# Patient Record
Sex: FEMALE | Race: WHITE | NOT HISPANIC OR LATINO | Employment: FULL TIME | URBAN - METROPOLITAN AREA
[De-identification: names, ages, dates, MRNs, and addresses within clinical notes are randomized per-mention and may not be internally consistent; named-entity substitution may affect disease eponyms.]

---

## 2017-04-24 ENCOUNTER — GENERIC CONVERSION - ENCOUNTER (OUTPATIENT)
Dept: OTHER | Facility: OTHER | Age: 27
End: 2017-04-24

## 2017-04-28 ENCOUNTER — GENERIC CONVERSION - ENCOUNTER (OUTPATIENT)
Dept: OTHER | Facility: OTHER | Age: 27
End: 2017-04-28

## 2018-01-11 NOTE — MISCELLANEOUS
Message  Return to work or school:   Rima Reno is under my professional care  She was seen in my office on 1/27/16  Please excuse from work 1/27/16               Signatures   Electronically signed by : Helen Jennings DO; Jan 27 2016  5:03PM EST                       (Author)
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
## 2018-03-19 ENCOUNTER — HOSPITAL ENCOUNTER (EMERGENCY)
Facility: HOSPITAL | Age: 28
Discharge: HOME | End: 2018-03-19
Payer: COMMERCIAL

## 2018-03-19 ENCOUNTER — APPOINTMENT (EMERGENCY)
Dept: RADIOLOGY | Facility: HOSPITAL | Age: 28
End: 2018-03-19
Payer: COMMERCIAL

## 2018-03-19 VITALS
SYSTOLIC BLOOD PRESSURE: 117 MMHG | WEIGHT: 210 LBS | DIASTOLIC BLOOD PRESSURE: 65 MMHG | HEART RATE: 90 BPM | TEMPERATURE: 98.3 F | RESPIRATION RATE: 16 BRPM | OXYGEN SATURATION: 100 %

## 2018-03-19 PROBLEM — M54.2 MUSCULOSKELETAL NECK PAIN: Status: ACTIVE | Noted: 2018-03-19

## 2018-03-19 LAB
BASE EXCESS BLDA CALC-SCNC: 4 MMOL/L (ref -2–3)
CA-I BLD-SCNC: 1.16 MMOL/L (ref 1.12–1.32)
GLUCOSE SERPL-MCNC: 106 MG/DL (ref 65–140)
HCO3 BLDA-SCNC: 27.3 MMOL/L (ref 24–30)
HCT VFR BLD CALC: 39 % (ref 34.8–46.1)
HGB BLDA-MCNC: 13.3 G/DL (ref 11.5–15.4)
HOLD SPECIMEN: NORMAL
PCO2 BLD: 28 MMOL/L (ref 21–32)
PCO2 BLD: 35 MM HG (ref 42–50)
PH BLD: 7.5 [PH] (ref 7.3–7.4)
PO2 BLD: 24 MM HG (ref 35–45)
POTASSIUM BLD-SCNC: 4.4 MMOL/L (ref 3.5–5.3)
SAO2 % BLD FROM PO2: 49 % (ref 95–98)
SODIUM BLD-SCNC: 139 MMOL/L (ref 136–145)
SPECIMEN SOURCE: ABNORMAL

## 2018-03-19 PROCEDURE — 99283 EMERGENCY DEPT VISIT LOW MDM: CPT | Performed by: SURGERY

## 2018-03-19 PROCEDURE — 99285 EMERGENCY DEPT VISIT HI MDM: CPT

## 2018-03-19 PROCEDURE — 82803 BLOOD GASES ANY COMBINATION: CPT

## 2018-03-19 PROCEDURE — 72125 CT NECK SPINE W/O DYE: CPT

## 2018-03-19 PROCEDURE — 84132 ASSAY OF SERUM POTASSIUM: CPT

## 2018-03-19 PROCEDURE — 70450 CT HEAD/BRAIN W/O DYE: CPT

## 2018-03-19 PROCEDURE — 74177 CT ABD & PELVIS W/CONTRAST: CPT

## 2018-03-19 PROCEDURE — 85014 HEMATOCRIT: CPT

## 2018-03-19 PROCEDURE — 96375 TX/PRO/DX INJ NEW DRUG ADDON: CPT

## 2018-03-19 PROCEDURE — 71045 X-RAY EXAM CHEST 1 VIEW: CPT

## 2018-03-19 PROCEDURE — 82330 ASSAY OF CALCIUM: CPT

## 2018-03-19 PROCEDURE — 71260 CT THORAX DX C+: CPT

## 2018-03-19 PROCEDURE — 96374 THER/PROPH/DIAG INJ IV PUSH: CPT

## 2018-03-19 PROCEDURE — 84295 ASSAY OF SERUM SODIUM: CPT

## 2018-03-19 PROCEDURE — 36415 COLL VENOUS BLD VENIPUNCTURE: CPT | Performed by: SURGERY

## 2018-03-19 PROCEDURE — 82947 ASSAY GLUCOSE BLOOD QUANT: CPT

## 2018-03-19 RX ORDER — KETOROLAC TROMETHAMINE 30 MG/ML
15 INJECTION, SOLUTION INTRAMUSCULAR; INTRAVENOUS ONCE
Status: COMPLETED | OUTPATIENT
Start: 2018-03-19 | End: 2018-03-19

## 2018-03-19 RX ORDER — FENTANYL CITRATE 50 UG/ML
50 INJECTION, SOLUTION INTRAMUSCULAR; INTRAVENOUS ONCE
Status: COMPLETED | OUTPATIENT
Start: 2018-03-19 | End: 2018-03-19

## 2018-03-19 RX ORDER — FENTANYL CITRATE 50 UG/ML
INJECTION, SOLUTION INTRAMUSCULAR; INTRAVENOUS CODE/TRAUMA/SEDATION MEDICATION
Status: COMPLETED | OUTPATIENT
Start: 2018-03-19 | End: 2018-03-19

## 2018-03-19 RX ORDER — ONDANSETRON 2 MG/ML
4 INJECTION INTRAMUSCULAR; INTRAVENOUS ONCE
Status: COMPLETED | OUTPATIENT
Start: 2018-03-19 | End: 2018-03-19

## 2018-03-19 RX ADMIN — FENTANYL CITRATE 50 MCG: 50 INJECTION, SOLUTION INTRAMUSCULAR; INTRAVENOUS at 19:30

## 2018-03-19 RX ADMIN — KETOROLAC TROMETHAMINE 15 MG: 30 INJECTION, SOLUTION INTRAMUSCULAR at 19:29

## 2018-03-19 RX ADMIN — ONDANSETRON 4 MG: 2 INJECTION INTRAMUSCULAR; INTRAVENOUS at 19:48

## 2018-03-19 RX ADMIN — FENTANYL CITRATE 50 MCG: 50 INJECTION, SOLUTION INTRAMUSCULAR; INTRAVENOUS at 16:40

## 2018-03-19 RX ADMIN — IOHEXOL 100 ML: 350 INJECTION, SOLUTION INTRAVENOUS at 16:56
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
## 2018-03-19 NOTE — PROGRESS NOTES
Patient re-evaluated and C collar cleared at bedside  Aware that all scans are negative  Patient feels sore, but would like to go home  She agrees to follow up with trauma as needed  Will discharge home with return precautions

## 2018-03-20 NOTE — DISCHARGE INSTR - AVS FIRST PAGE
As needed, for increasing neck pain, weakness, numbness, tingling, headache, persistent nausea/vomiting, or new/concerning symptoms

## 2018-03-23 ENCOUNTER — OFFICE VISIT (OUTPATIENT)
Dept: FAMILY MEDICINE CLINIC | Facility: CLINIC | Age: 28
End: 2018-03-23
Payer: COMMERCIAL

## 2018-03-23 VITALS
DIASTOLIC BLOOD PRESSURE: 62 MMHG | HEART RATE: 84 BPM | RESPIRATION RATE: 16 BRPM | TEMPERATURE: 96.3 F | HEIGHT: 62 IN | BODY MASS INDEX: 38.46 KG/M2 | WEIGHT: 209 LBS | SYSTOLIC BLOOD PRESSURE: 106 MMHG

## 2018-03-23 DIAGNOSIS — S06.0X0S CONCUSSION WITHOUT LOSS OF CONSCIOUSNESS, SEQUELA (HCC): Primary | ICD-10-CM

## 2018-03-23 PROBLEM — S06.0X0A CONCUSSION WITHOUT LOSS OF CONSCIOUSNESS: Status: ACTIVE | Noted: 2018-03-23

## 2018-03-23 PROBLEM — Z83.49 FAMILY HISTORY OF ALPHA 1 ANTITRYPSIN DEFICIENCY: Status: ACTIVE | Noted: 2017-06-07

## 2018-03-23 PROCEDURE — 99213 OFFICE O/P EST LOW 20 MIN: CPT | Performed by: FAMILY MEDICINE
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
## 2018-03-29 ENCOUNTER — OFFICE VISIT (OUTPATIENT)
Dept: OBGYN CLINIC | Facility: CLINIC | Age: 28
End: 2018-03-29
Payer: COMMERCIAL

## 2018-03-29 VITALS
BODY MASS INDEX: 38.46 KG/M2 | DIASTOLIC BLOOD PRESSURE: 70 MMHG | SYSTOLIC BLOOD PRESSURE: 124 MMHG | HEIGHT: 62 IN | WEIGHT: 209 LBS

## 2018-03-29 DIAGNOSIS — S06.0X0S CONCUSSION WITHOUT LOSS OF CONSCIOUSNESS, SEQUELA (HCC): ICD-10-CM

## 2018-03-29 PROCEDURE — 99204 OFFICE O/P NEW MOD 45 MIN: CPT | Performed by: ORTHOPAEDIC SURGERY
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
## 2018-07-23 ENCOUNTER — OFFICE VISIT (OUTPATIENT)
Dept: FAMILY MEDICINE CLINIC | Facility: CLINIC | Age: 28
End: 2018-07-23
Payer: COMMERCIAL

## 2018-07-23 VITALS
HEART RATE: 77 BPM | DIASTOLIC BLOOD PRESSURE: 64 MMHG | SYSTOLIC BLOOD PRESSURE: 102 MMHG | OXYGEN SATURATION: 97 % | WEIGHT: 211 LBS | RESPIRATION RATE: 16 BRPM | BODY MASS INDEX: 37.39 KG/M2 | HEIGHT: 63 IN

## 2018-07-23 DIAGNOSIS — Z02.1 PRE-EMPLOYMENT HEALTH SCREENING EXAMINATION: ICD-10-CM

## 2018-07-23 PROCEDURE — 99499 UNLISTED E&M SERVICE: CPT | Performed by: FAMILY MEDICINE

## 2018-07-23 NOTE — PROGRESS NOTES
Assessment/Plan:    Pre-employment health screening examination  A/P:  Pre-employment drug screening done during this visit  Patient has a follow-up visit with PCP at which time she will do her blood work including lipid panel, CBC and CMP  Subjective:      Patient ID: Jean Carlos Tierney is a 32 y o  female  Patient is a 19-year-old female with history of chronic sinusitis here for an employment physical  Patient is a  and works at [employer]  Patient follows up with her regular PCP Dr Kurtis Ga  Currently not on any medications except prenatal vitamins  Patient has 9month-old status post  for elevated blood pressure  She is currently trying to lose weight going to the gym regularly  Patient is , has not been sexually active since she gave birth to the baby stating that she does not feel confident with her current weight  Not on any birth control, stating it may dry up her milk source  Patient currently not interested in any contraception  States she occasionally drinks a glass of wine, denies smoking or illicit drug use  Patient was glasses and follows up with her regular eye doctor  Patient states she has an upcoming appointment with her PCP, and would like to wait and do her annual lab work at that time  No complaints at this time  The following portions of the patient's history were reviewed and updated as appropriate: allergies, current medications, past family history, past medical history, past social history, past surgical history and problem list     Review of Systems   Constitutional: Negative for fatigue  Eyes: Negative for visual disturbance  Respiratory: Negative for shortness of breath  Cardiovascular: Negative for chest pain  Gastrointestinal: Negative for abdominal pain, constipation, diarrhea, nausea and vomiting  Genitourinary: Negative for dysuria and vaginal discharge     Musculoskeletal: Negative for arthralgias and gait problem  Skin: Negative  Neurological: Negative  Negative for headaches  Psychiatric/Behavioral: Negative  Objective:      /64 (BP Location: Left arm, Patient Position: Sitting)   Pulse 77   Resp 16   Ht 5' 3" (1 6 m)   Wt 95 7 kg (211 lb)   SpO2 97%   BMI 37 38 kg/m²          Physical Exam   Constitutional: She is oriented to person, place, and time  She appears well-developed and well-nourished  No distress  HENT:   Head: Normocephalic and atraumatic  Nose: Nose normal    Mouth/Throat: Oropharynx is clear and moist    Eyes: Conjunctivae and EOM are normal  Pupils are equal, round, and reactive to light  Neck: Normal range of motion  Neck supple  Cardiovascular: Normal rate, regular rhythm and normal heart sounds  Pulmonary/Chest: Effort normal and breath sounds normal  No respiratory distress  She has no wheezes  She has no rales  She exhibits no tenderness  Abdominal: Soft  Bowel sounds are normal  She exhibits no distension and no mass  There is no tenderness  There is no rebound and no guarding  Musculoskeletal: Normal range of motion  She exhibits no edema, tenderness or deformity  Lymphadenopathy:     She has no cervical adenopathy  Neurological: She is alert and oriented to person, place, and time  Skin: Skin is warm and dry  No rash noted  No erythema  No pallor  Psychiatric: She has a normal mood and affect  Nursing note and vitals reviewed

## 2018-07-24 PROBLEM — Z02.1 PRE-EMPLOYMENT HEALTH SCREENING EXAMINATION: Status: ACTIVE | Noted: 2018-07-24

## 2018-07-24 NOTE — ASSESSMENT & PLAN NOTE
A/P:  Pre-employment drug screening done during this visit  Patient has a follow-up visit with PCP at which time she will do her blood work including lipid panel, CBC and CMP

## 2020-01-27 ENCOUNTER — OFFICE VISIT (OUTPATIENT)
Dept: FAMILY MEDICINE CLINIC | Facility: CLINIC | Age: 30
End: 2020-01-27
Payer: COMMERCIAL

## 2020-01-27 ENCOUNTER — TELEPHONE (OUTPATIENT)
Dept: FAMILY MEDICINE CLINIC | Facility: CLINIC | Age: 30
End: 2020-01-27

## 2020-01-27 VITALS
RESPIRATION RATE: 16 BRPM | TEMPERATURE: 98.3 F | OXYGEN SATURATION: 96 % | HEART RATE: 85 BPM | SYSTOLIC BLOOD PRESSURE: 118 MMHG | HEIGHT: 63 IN | DIASTOLIC BLOOD PRESSURE: 88 MMHG | BODY MASS INDEX: 37.39 KG/M2 | WEIGHT: 211 LBS

## 2020-01-27 DIAGNOSIS — J02.9 ACUTE PHARYNGITIS, UNSPECIFIED ETIOLOGY: Primary | ICD-10-CM

## 2020-01-27 DIAGNOSIS — Z13.6 SCREENING FOR CARDIOVASCULAR CONDITION: ICD-10-CM

## 2020-01-27 DIAGNOSIS — Z13.0 SCREENING FOR DEFICIENCY ANEMIA: ICD-10-CM

## 2020-01-27 LAB — S PYO AG THROAT QL: NEGATIVE

## 2020-01-27 PROCEDURE — 99213 OFFICE O/P EST LOW 20 MIN: CPT | Performed by: FAMILY MEDICINE

## 2020-01-27 PROCEDURE — 87880 STREP A ASSAY W/OPTIC: CPT | Performed by: FAMILY MEDICINE

## 2020-01-27 RX ORDER — LIDOCAINE HYDROCHLORIDE 20 MG/ML
15 SOLUTION OROPHARYNGEAL 4 TIMES DAILY PRN
Qty: 100 ML | Refills: 0 | Status: SHIPPED | OUTPATIENT
Start: 2020-01-27 | End: 2020-02-07 | Stop reason: ALTCHOICE

## 2020-01-27 RX ORDER — AMOXICILLIN 400 MG/5ML
800 POWDER, FOR SUSPENSION ORAL 2 TIMES DAILY
Qty: 200 ML | Refills: 0 | Status: SHIPPED | OUTPATIENT
Start: 2020-01-27 | End: 2020-02-07 | Stop reason: ALTCHOICE

## 2020-01-27 RX ORDER — NORETHINDRONE ACETATE AND ETHINYL ESTRADIOL AND FERROUS FUMARATE 1MG-20(24)
KIT ORAL
COMMUNITY
Start: 2019-12-09 | End: 2020-06-29 | Stop reason: ALTCHOICE

## 2020-01-27 NOTE — TELEPHONE ENCOUNTER
Pt states the pain in her throat is a lot worse than this morning, she states she took 800mg motrin and that didn't touch the pain, also states she tried the spray and that did not help either  Pt states mom is a nurse and recommended a lidocaine gargle     Please advise [facility]

## 2020-01-27 NOTE — LETTER
January 27, 2020     Patient: Sushila Reyes   YOB: 1990   Date of Visit: 1/27/2020       To Whom it May Concern:    Brittnee Au is under my professional care  She was seen in my office on 1/27/2020  If you have any questions or concerns, please don't hesitate to call           Sincerely,          Shazia Coyne DO        CC: No Recipients

## 2020-01-27 NOTE — PROGRESS NOTES
Assessment/Plan:    1  Acute pharyngitis, unspecified etiology  Comments:  New  Orders:  -     POCT rapid strepA  -     amoxicillin (AMOXIL) 400 MG/5ML suspension; Take 10 mL (800 mg total) by mouth 2 (two) times a day for 10 days    2  Screening for cardiovascular condition  -     Comprehensive metabolic panel; Future  -     Lipid Panel with Direct LDL reflex; Future  -     Comprehensive metabolic panel  -     Lipid Panel with Direct LDL reflex    3  Screening for deficiency anemia  -     CBC; Future  -     CBC           There are no Patient Instructions on file for this visit  Return for February as scheduled   Subjective:      Patient ID: Martell Riley is a 34 y o  female  Chief Complaint   Patient presents with    Sore Throat     started thursday  vfrma    Fever     102 on saturday morning    Sinus Problem    Earache       She has a sore throat that started a few days ago  Her voice started going on the 24th  She has not been sleeping  She had a fever on the 25th  She has been taking Sudafed and advil  She has nasal congestion and sinus pressure  The following portions of the patient's history were reviewed and updated as appropriate:  past social history    Review of Systems   HENT: Positive for sore throat  Current Outpatient Medications   Medication Sig Dispense Refill    Norethin Ace-Eth Estrad-FE 1-20 MG-MCG(24) CHEW CSW ONE T PO D      amoxicillin (AMOXIL) 400 MG/5ML suspension Take 10 mL (800 mg total) by mouth 2 (two) times a day for 10 days 200 mL 0    Prenatal MV-Min-Fe Fum-FA-DHA (PRENATAL 1 PO) Take by mouth       No current facility-administered medications for this visit  Objective:    /88   Pulse 85   Temp 98 3 °F (36 8 °C)   Resp 16   Ht 5' 3" (1 6 m)   Wt 95 7 kg (211 lb)   SpO2 96%   BMI 37 38 kg/m²      Physical Exam   Constitutional: She appears well-developed and well-nourished  HENT:   Head: Normocephalic and atraumatic     Right Ear: External ear normal    Left Ear: External ear normal    Mouth/Throat: Uvula is midline  Oropharyngeal exudate present  Cardiovascular: Normal rate, regular rhythm and normal heart sounds  Exam reveals no friction rub  No murmur heard  Pulmonary/Chest: Effort normal and breath sounds normal  No respiratory distress  She has no wheezes  She has no rales  Musculoskeletal: She exhibits no edema or deformity  Nursing note and vitals reviewed            Adwoa Palmer DO

## 2020-01-31 ENCOUNTER — OFFICE VISIT (OUTPATIENT)
Dept: FAMILY MEDICINE CLINIC | Facility: CLINIC | Age: 30
End: 2020-01-31
Payer: COMMERCIAL

## 2020-01-31 VITALS
WEIGHT: 211 LBS | DIASTOLIC BLOOD PRESSURE: 78 MMHG | TEMPERATURE: 99.5 F | SYSTOLIC BLOOD PRESSURE: 114 MMHG | BODY MASS INDEX: 37.39 KG/M2 | HEART RATE: 80 BPM | HEIGHT: 63 IN | RESPIRATION RATE: 16 BRPM

## 2020-01-31 DIAGNOSIS — H10.31 ACUTE CONJUNCTIVITIS OF RIGHT EYE, UNSPECIFIED ACUTE CONJUNCTIVITIS TYPE: Primary | ICD-10-CM

## 2020-01-31 PROCEDURE — 99213 OFFICE O/P EST LOW 20 MIN: CPT | Performed by: NURSE PRACTITIONER

## 2020-01-31 PROCEDURE — 3008F BODY MASS INDEX DOCD: CPT | Performed by: NURSE PRACTITIONER

## 2020-01-31 RX ORDER — MOXIFLOXACIN 5 MG/ML
1 SOLUTION/ DROPS OPHTHALMIC 3 TIMES DAILY
Qty: 3 ML | Refills: 0 | Status: SHIPPED | OUTPATIENT
Start: 2020-01-31 | End: 2020-02-07

## 2020-01-31 NOTE — LETTER
January 31, 2020     Patient: Kennedi Spencer   YOB: 1990   Date of Visit: 1/31/2020       To Whom it May Concern:    Mei Weber is under my professional care  She was seen in my office on 1/31/2020  She may return to work on 02/3/2020  If you have any questions or concerns, please don't hesitate to call           Sincerely,          BETTY Guerrero        CC: No Recipients

## 2020-01-31 NOTE — PROGRESS NOTES
Assessment/Plan:    1  Acute conjunctivitis of right eye, unspecified acute conjunctivitis type  -     moxifloxacin (VIGAMOX) 0 5 % ophthalmic solution; Administer 1 drop to the right eye 3 (three) times a day for 7 days          BMI Counseling: Body mass index is 37 68 kg/m²  Discussed the patient's BMI with her  The BMI is above normal  Nutrition recommendations include reducing portion sizes, decreasing overall calorie intake, 3-5 servings of fruits/vegetables daily, reducing fast food intake, consuming healthier snacks, decreasing soda and/or juice intake and moderation in carbohydrate intake  Patient Instructions:  Supportive care discussed and advised  Advised to RTO for any worsening and no improvement  Follow up for no improvement and worsening of conditions  Patient advised and educated when to see immediate medical care  Return if symptoms worsen or fail to improve  Future Appointments   Date Time Provider Don Fung   2/7/2020 10:45 AM DO CAMMIE Dickinson [facility]           Subjective:      Patient ID: Hunter Samuel is a 34 y o  female  Chief Complaint   Patient presents with    Follow-up     Was here Monday  Now, right eye is itchy and the left eye is starting to itch  Yellow drainage from right eye [id number]         Vitals:  /78   Pulse 80   Temp 99 5 °F (37 5 °C)   Resp 16   Ht 5' 2 75" (1 594 m)   Wt 95 7 kg (211 lb)   LMP 01/20/2020 (Exact Date)   BMI 37 68 kg/m²     HPI  Patient stated that was seen couple of days ago for acute pharyngitis and on amxo and feeling better but still congested and started using mucinex  Today woke up with right eye redness and discharge  Tried warm compresses  Denies any vision changes  Denies fever, chills and sob            The following portions of the patient's history were reviewed and updated as appropriate: allergies, current medications, past family history, past medical history, past social history, past surgical history and problem list       Review of Systems   Constitutional: Negative for chills, diaphoresis, fatigue, fever and unexpected weight change  HENT: Positive for congestion  Negative for dental problem, drooling, ear discharge, ear pain, facial swelling, hearing loss, mouth sores, nosebleeds, postnasal drip, rhinorrhea, sinus pressure, sinus pain, sneezing, sore throat, tinnitus, trouble swallowing and voice change  Eyes: Positive for discharge and redness  Negative for photophobia, pain, itching and visual disturbance  Respiratory: Negative for cough, chest tightness, shortness of breath and wheezing  Cardiovascular: Negative  Gastrointestinal: Negative for abdominal pain, constipation, diarrhea, nausea and vomiting  Musculoskeletal: Negative  Skin: Negative  Neurological: Negative for dizziness, weakness, light-headedness and headaches  Hematological: Negative  Objective:    Social History     Tobacco Use   Smoking Status Never Smoker   Smokeless Tobacco Never Used       Allergies: No Known Allergies      Current Outpatient Medications   Medication Sig Dispense Refill    amoxicillin (AMOXIL) 400 MG/5ML suspension Take 10 mL (800 mg total) by mouth 2 (two) times a day for 10 days 200 mL 0    Norethin Ace-Eth Estrad-FE 1-20 MG-MCG(24) CHEW CSW ONE T PO D      Lidocaine Viscous HCl (XYLOCAINE) 2 % mucosal solution Swish and spit 15 mL 4 (four) times a day as needed for mouth pain or discomfort (Patient not taking: Reported on 1/31/2020) 100 mL 0    moxifloxacin (VIGAMOX) 0 5 % ophthalmic solution Administer 1 drop to the right eye 3 (three) times a day for 7 days 3 mL 0    Prenatal MV-Min-Fe Fum-FA-DHA (PRENATAL 1 PO) Take by mouth       No current facility-administered medications for this visit  Physical Exam   Constitutional: She is oriented to person, place, and time  She appears well-developed and well-nourished  HENT:   Head: Normocephalic     Right Ear: Tympanic membrane, external ear and ear canal normal    Left Ear: Tympanic membrane, external ear and ear canal normal    Nose: Mucosal edema present  Right sinus exhibits no maxillary sinus tenderness and no frontal sinus tenderness  Left sinus exhibits no maxillary sinus tenderness and no frontal sinus tenderness  Mouth/Throat: Oropharynx is clear and moist and mucous membranes are normal    Eyes: Lids are normal  Right conjunctiva is injected  Left conjunctiva is not injected  Neck: Neck supple  Cardiovascular: Normal rate, regular rhythm and normal heart sounds  Pulmonary/Chest: Effort normal and breath sounds normal    Abdominal: Normal appearance and bowel sounds are normal  There is no hepatosplenomegaly  There is no tenderness  There is no rebound  Musculoskeletal: Normal range of motion  Lymphadenopathy:        Right cervical: No superficial cervical and no posterior cervical adenopathy present  Left cervical: No superficial cervical and no posterior cervical adenopathy present  Neurological: She is alert and oriented to person, place, and time  Skin: Skin is warm and dry  Psychiatric: She has a normal mood and affect  Her behavior is normal  Judgment and thought content normal    Vitals reviewed                    BETTY Gonzalez

## 2020-01-31 NOTE — PATIENT INSTRUCTIONS
Supportive care discussed and advised  Advised to RTO for any worsening and no improvement  Follow up for no improvement and worsening of conditions  Patient advised and educated when to see immediate medical care  Conjunctivitis   AMBULATORY CARE:   Conjunctivitis,  or pink eye, is inflammation of your conjunctiva  The conjunctiva is a thin tissue that covers the front of your eye and the back of your eyelids  The conjunctiva helps protect your eye and keep it moist  Conjunctivitis may be caused by bacteria, allergies, or a virus  If your conjunctivitis is caused by bacteria, it may get better on its own in about 7 days  Viral conjunctivitis can last up to 3 weeks  Common symptoms may include any of the following: You will usually have symptoms in both eyes if your conjunctivitis is caused by allergies  You may also have other allergic symptoms, such as a rash or runny nose  Symptoms will usually start in 1 eye if your conjunctivitis is caused by a virus or bacteria  · Redness in the whites of your eye    · Itching in your eye or around your eye    · Feeling like there is something in your eye    · Watery or thick, sticky discharge    · Crusty eyelids when you wake up in the morning    · Burning, stinging, or swelling in your eye    · Pain when you see bright light  Seek care immediately if:   · You have worsening eye pain  · The swelling in your eye gets worse, even after treatment  · Your vision suddenly becomes worse or you cannot see at all  Contact your healthcare provider if:   · You develop a fever and ear pain  · You have tiny bumps or spots of blood on your eye  · You have questions or concerns about your condition or care  Treatment  will depend on the cause of your conjunctivitis  You may need antibiotics or allergy medicine as a pill, eye drop, or eye ointment  Manage your symptoms:   · Apply a cool compress  Wet a washcloth with cold water and place it on your eye  This will help decrease itching and irritation  · Do not wear contact lenses  They can irritate your eye  Throw away the pair you are using and ask when you can wear them again  Use a new pair of lenses when your healthcare provider says it is okay  · Avoid irritants  Stay away from smoke filled areas  Shield your eyes from wind and sun  · Flush your eye  You may need to flush your eye with saline to help decrease your symptoms  Ask for more information on how to flush your eye  Medicines:  Treatment depends on what is causing your conjunctivitis  You may be given any of the following:  · Allergy medicine  helps decrease itchy, red, swollen eyes caused by allergies  It may be given as a pill, eye drops, or nasal spray  · Antibiotics  may be needed if your conjunctivitis is caused by bacteria  This medicine may be given as a pill, eye drops, or eye ointment  · Take your medicine as directed  Contact your healthcare provider if you think your medicine is not helping or if you have side effects  Tell him or her if you are allergic to any medicine  Keep a list of the medicines, vitamins, and herbs you take  Include the amounts, and when and why you take them  Bring the list or the pill bottles to follow-up visits  Carry your medicine list with you in case of an emergency  Prevent the spread of conjunctivitis:   · Wash your hands with soap and water often  Wash your hands before and after you touch your eyes  Also wash your hands before you prepare or eat food and after you use the bathroom or change a diaper  · Avoid allergens  Try to avoid the things that cause your allergies, such as pets, dust, or grass  · Avoid contact with others  Do not share towels or washcloths  Try to stay away from others as much as possible  Ask when you can return to work or school  · Throw away eye makeup  The bacteria that caused your conjunctivitis can stay in eye makeup   Throw away mascara and other eye makeup  © 2017 [facility] Information is for End User's use only and may not be sold, redistributed or otherwise used for commercial purposes  All illustrations and images included in CareNotes® are the copyrighted property of A D A M , Inc  or Marquise Alfaro  The above information is an  only  It is not intended as medical advice for individual conditions or treatments  Talk to your doctor, nurse or pharmacist before following any medical regimen to see if it is safe and effective for you

## 2020-02-04 LAB
ALBUMIN SERPL-MCNC: 4.1 G/DL (ref 3.9–5)
ALBUMIN/GLOB SERPL: 1.3 {RATIO} (ref 1.2–2.2)
ALP SERPL-CCNC: 53 IU/L (ref 39–117)
ALT SERPL-CCNC: 38 IU/L (ref 0–32)
AST SERPL-CCNC: 31 IU/L (ref 0–40)
BILIRUB SERPL-MCNC: <0.2 MG/DL (ref 0–1.2)
BUN SERPL-MCNC: 14 MG/DL (ref 6–20)
BUN/CREAT SERPL: 18 (ref 9–23)
CALCIUM SERPL-MCNC: 9.5 MG/DL (ref 8.7–10.2)
CHLORIDE SERPL-SCNC: 99 MMOL/L (ref 96–106)
CHOLEST SERPL-MCNC: 204 MG/DL (ref 100–199)
CO2 SERPL-SCNC: 23 MMOL/L (ref 20–29)
CREAT SERPL-MCNC: 0.77 MG/DL (ref 0.57–1)
ERYTHROCYTE [DISTWIDTH] IN BLOOD BY AUTOMATED COUNT: 13.4 % (ref 11.7–15.4)
GLOBULIN SER-MCNC: 3.1 G/DL (ref 1.5–4.5)
GLUCOSE SERPL-MCNC: 109 MG/DL (ref 65–99)
HCT VFR BLD AUTO: 40.3 % (ref 34–46.6)
HDLC SERPL-MCNC: 45 MG/DL
HGB BLD-MCNC: 14 G/DL (ref 11.1–15.9)
LDLC SERPL CALC-MCNC: 138 MG/DL (ref 0–99)
LDLC/HDLC SERPL: 3.1 RATIO (ref 0–3.2)
MCH RBC QN AUTO: 30.3 PG (ref 26.6–33)
MCHC RBC AUTO-ENTMCNC: 34.7 G/DL (ref 31.5–35.7)
MCV RBC AUTO: 87 FL (ref 79–97)
MICRODELETION SYND BLD/T FISH: NORMAL
PLATELET # BLD AUTO: 392 X10E3/UL (ref 150–450)
POTASSIUM SERPL-SCNC: 4.8 MMOL/L (ref 3.5–5.2)
PROT SERPL-MCNC: 7.2 G/DL (ref 6–8.5)
RBC # BLD AUTO: 4.62 X10E6/UL (ref 3.77–5.28)
SL AMB EGFR AFRICAN AMERICAN: 121 ML/MIN/1.73
SL AMB EGFR NON AFRICAN AMERICAN: 105 ML/MIN/1.73
SL AMB VLDL CHOLESTEROL CALC: 21 MG/DL (ref 5–40)
SODIUM SERPL-SCNC: 140 MMOL/L (ref 134–144)
TRIGL SERPL-MCNC: 105 MG/DL (ref 0–149)
WBC # BLD AUTO: 7.2 X10E3/UL (ref 3.4–10.8)

## 2020-02-07 ENCOUNTER — OFFICE VISIT (OUTPATIENT)
Dept: FAMILY MEDICINE CLINIC | Facility: CLINIC | Age: 30
End: 2020-02-07
Payer: COMMERCIAL

## 2020-02-07 VITALS
HEART RATE: 85 BPM | HEIGHT: 63 IN | OXYGEN SATURATION: 96 % | DIASTOLIC BLOOD PRESSURE: 80 MMHG | SYSTOLIC BLOOD PRESSURE: 112 MMHG | RESPIRATION RATE: 16 BRPM | BODY MASS INDEX: 37.03 KG/M2 | TEMPERATURE: 98.5 F | WEIGHT: 209 LBS

## 2020-02-07 DIAGNOSIS — F41.1 GENERALIZED ANXIETY DISORDER: ICD-10-CM

## 2020-02-07 DIAGNOSIS — R74.8 ELEVATED LIVER ENZYMES: ICD-10-CM

## 2020-02-07 DIAGNOSIS — R73.09 ABNORMAL GLUCOSE: ICD-10-CM

## 2020-02-07 DIAGNOSIS — Z00.00 ANNUAL PHYSICAL EXAM: Primary | ICD-10-CM

## 2020-02-07 PROCEDURE — 3008F BODY MASS INDEX DOCD: CPT | Performed by: FAMILY MEDICINE

## 2020-02-07 PROCEDURE — 99395 PREV VISIT EST AGE 18-39: CPT | Performed by: FAMILY MEDICINE

## 2020-02-07 RX ORDER — ESCITALOPRAM OXALATE 10 MG/1
10 TABLET ORAL DAILY
Qty: 30 TABLET | Refills: 1 | Status: SHIPPED | OUTPATIENT
Start: 2020-02-07 | End: 2020-02-28

## 2020-02-07 NOTE — PROGRESS NOTES
FAMILY PRACTICE HEALTH MAINTENANCE OFFICE VISIT  [facility]    NAME: Fabiana Nolan  AGE: 34 y o  SEX: female  : 1990     DATE: 2020    Assessment and Plan     1  Annual physical exam    2  Elevated liver enzymes  -     Comprehensive metabolic panel; Future  -     Comprehensive metabolic panel    3  Abnormal glucose  Assessment & Plan:  Borderline  Will recheck       4  Generalized anxiety disorder  -     escitalopram (LEXAPRO) 10 mg tablet; Take 1 tablet (10 mg total) by mouth daily      · Patient Counseling:   · Nutrition: Stressed importance of a well balanced diet, moderation of sodium/saturated fat, caloric balance and sufficient intake of fiber  · Exercise: Stressed the importance of regular exercise with a goal of 150 minutes per week  · Dental Health: Discussed daily flossing and brushing and regular dental visits     · Immunizations reviewed: Up To Date  · Discussed benefits of:  Cervical Cancer screening   BMI Counseling: Body mass index is 37 32 kg/m²  Discussed with patient's BMI with her  The BMI is above normal  Exercise recommendations include exercising 3-5 times per week  Return in about 2 months (around 2020) for Next scheduled follow up  Chief Complaint     Chief Complaint   Patient presents with    Physical Exam     no pap needed  vfrma       History of Present Illness     She is still having nasal congestion  Her throat is still sore, but better  She is getting overwhelmed with anxiety  She shut down one day a week ago  She tends to bottle it up         Well Adult Physical   Patient here for a comprehensive physical exam       Diet and Physical Activity  Diet: well balanced diet  Exercise: infrequently      Depression Screen  PHQ-9 Depression Screening    PHQ-9:    Frequency of the following problems over the past two weeks:       Little interest or pleasure in doing things:  0 - not at all  Feeling down, depressed, or hopeless:  0 - not at all  PHQ-2 Score:  0          General Health  Hearing: Normal:  bilateral  Vision: wears glasses  Dental: regular dental visits    Reproductive Health  Regular Periods, has stopped breast feeding       The following portions of the patient's history were reviewed and updated as appropriate: allergies, current medications, past family history, past medical history, past social history, past surgical history and problem list     Review of Systems     Review of Systems   Respiratory: Negative  Cardiovascular: Negative  Psychiatric/Behavioral: The patient is nervous/anxious          Past Medical History     Past Medical History:   Diagnosis Date    Abnormal weight gain     Candidiasis, mouth     Cellulitis of right arm     Kidney infection     SIRS (systemic inflammatory response syndrome) (HCC)     Tinea corporis        Past Surgical History     Past Surgical History:   Procedure Laterality Date     SECTION      FOOT SURGERY         Social History     Social History     Socioeconomic History    Marital status: /Civil Union     Spouse name: None    Number of children: None    Years of education: None    Highest education level: None   Occupational History    None   Social Needs    Financial resource strain: None    Food insecurity:     Worry: None     Inability: None    Transportation needs:     Medical: None     Non-medical: None   Tobacco Use    Smoking status: Never Smoker    Smokeless tobacco: Never Used   Substance and Sexual Activity    Alcohol use: No    Drug use: No    Sexual activity: None   Lifestyle    Physical activity:     Days per week: None     Minutes per session: None    Stress: None   Relationships    Social connections:     Talks on phone: None     Gets together: None     Attends Sabianism service: None     Active member of club or organization: None     Attends meetings of clubs or organizations: None     Relationship status: None  Intimate partner violence:     Fear of current or ex partner: None     Emotionally abused: None     Physically abused: None     Forced sexual activity: None   Other Topics Concern    None   Social History Narrative    None       Family History     Family History   Problem Relation Age of Onset    Diabetes Maternal Grandmother     Cancer Maternal Grandfather     Cancer Paternal Grandfather     Diabetes Mother     No Known Problems Father     Other Half-Sister         ITP    Depression Half-Sister     Learning disabilities Half-Brother     Auditory processing disorder Half-Brother        Current Medications       Current Outpatient Medications:     moxifloxacin (VIGAMOX) 0 5 % ophthalmic solution, Administer 1 drop to the right eye 3 (three) times a day for 7 days, Disp: 3 mL, Rfl: 0    Norethin Ace-Eth Estrad-FE 1-20 MG-MCG(24) CHEW, CSW ONE T PO D, Disp: , Rfl:     escitalopram (LEXAPRO) 10 mg tablet, Take 1 tablet (10 mg total) by mouth daily, Disp: 30 tablet, Rfl: 1     Allergies     No Known Allergies    Objective     /80   Pulse 85   Temp 98 5 °F (36 9 °C)   Resp 16   Ht 5' 2 75" (1 594 m)   Wt 94 8 kg (209 lb)   LMP 01/20/2020 (Exact Date)   SpO2 96%   Breastfeeding No   BMI 37 32 kg/m²      Physical Exam   Constitutional: She is oriented to person, place, and time  She appears well-developed and well-nourished  HENT:   Head: Normocephalic and atraumatic  Right Ear: External ear normal    Left Ear: External ear normal    Mouth/Throat: Oropharynx is clear and moist    Eyes: Pupils are equal, round, and reactive to light  Neck: Normal range of motion  Cardiovascular: Normal rate, regular rhythm and normal heart sounds  Exam reveals no friction rub  No murmur heard  Pulmonary/Chest: Effort normal and breath sounds normal  No respiratory distress  She has no wheezes  She has no rales  Abdominal: Soft  Bowel sounds are normal  She exhibits no distension and no mass  There is no tenderness  There is no rebound and no guarding  Musculoskeletal: Normal range of motion  She exhibits no edema  Neurological: She is alert and oriented to person, place, and time  She has normal reflexes  Skin: Skin is warm  Nursing note and vitals reviewed           Visual Acuity Screening    Right eye Left eye Both eyes   Without correction:      With correction: 20/20 20/20 1 [facility], [address] [place] [address]

## 2020-02-27 DIAGNOSIS — F41.1 GENERALIZED ANXIETY DISORDER: ICD-10-CM

## 2020-02-27 NOTE — TELEPHONE ENCOUNTER
Requested medication(s) are due for refill today: Yes  Patient has already received a courtesy refill: No  Other reason request has been forwarded to provider: failed protocol

## 2020-02-28 RX ORDER — ESCITALOPRAM OXALATE 10 MG/1
10 TABLET ORAL DAILY
Qty: 30 TABLET | Refills: 1 | Status: SHIPPED | OUTPATIENT
Start: 2020-02-28 | End: 2020-03-24 | Stop reason: SDUPTHER

## 2020-03-24 ENCOUNTER — TELEMEDICINE (OUTPATIENT)
Dept: FAMILY MEDICINE CLINIC | Facility: CLINIC | Age: 30
End: 2020-03-24
Payer: COMMERCIAL

## 2020-03-24 DIAGNOSIS — F41.1 GENERALIZED ANXIETY DISORDER: Primary | ICD-10-CM

## 2020-03-24 DIAGNOSIS — R74.8 ELEVATED LIVER ENZYMES: ICD-10-CM

## 2020-03-24 PROBLEM — Z02.1 PRE-EMPLOYMENT HEALTH SCREENING EXAMINATION: Status: RESOLVED | Noted: 2018-07-24 | Resolved: 2020-03-24

## 2020-03-24 PROCEDURE — 99213 OFFICE O/P EST LOW 20 MIN: CPT | Performed by: FAMILY MEDICINE

## 2020-03-24 RX ORDER — ESCITALOPRAM OXALATE 5 MG/1
5 TABLET ORAL DAILY
Qty: 30 TABLET | Refills: 3 | Status: SHIPPED | OUTPATIENT
Start: 2020-03-24 | End: 2020-06-29 | Stop reason: ALTCHOICE

## 2020-03-24 NOTE — PROGRESS NOTES
Virtual Regular Visit    Problem List Items Addressed This Visit     BMI 37 0-37 9, adult     She reports that she is down 10 pounds  Will keep working on diet         Elevated liver enzymes     Was slightly elevated  Will recheck labs when [employer] outbreak calms down         Generalized anxiety disorder - Primary     Patient has been doing well on her medication  Dose of Lexapro decreased from 10-5 mg daily    Patient will take the 5 mg daily for at least a month  If she is feeling well and she may take it every other day for at least 2 weeks before stopping the medication         Relevant Medications    escitalopram (LEXAPRO) 5 mg tablet               Reason for visit is follow up medication  Encounter provider Sandra Joseph DO    Provider located at [address]  [address] [facility]  [address]  [place] [address]      Recent Visits  No visits were found meeting these conditions  Showing recent visits within past 7 days and meeting all other requirements     Future Appointments  No visits were found meeting these conditions  Showing future appointments within next 150 days and meeting all other requirements        After connecting through [employer], the patient was identified by name and date of birth  Deon Hemphill was informed that this is a telemedicine visit and that the visit is being conducted through [facility] which may not be secure and therefore, might not be HIPAA-compliant  My office door was closed  No one else was in the room  She acknowledged consent and understanding of privacy and security of the video platform  The patient has agreed to participate and understands they can discontinue the visit at any time  Bang Taylor is a 34 y o  female     She is doing better on the anxiety medication  She has started meditation and is reading pretty much every night  She is taking the medication every other day    It helped her a lot in the beginning  She has been eating better and moving more  She has lost 10 pounds  Past Medical History:   Diagnosis Date    Abnormal weight gain     Candidiasis, mouth     Cellulitis of right arm     Kidney infection     Pre-employment health screening examination 2018    SIRS (systemic inflammatory response syndrome) (HCC)     Tinea corporis        Past Surgical History:   Procedure Laterality Date     SECTION      FOOT SURGERY         Current Outpatient Medications   Medication Sig Dispense Refill    escitalopram (LEXAPRO) 5 mg tablet Take 1 tablet (5 mg total) by mouth daily 30 tablet 3    Norethin Ace-Eth Estrad-FE 1-20 MG-MCG(24) CHEW CSW ONE T PO D       No current facility-administered medications for this visit  No Known Allergies    Review of Systems   Respiratory: Negative  Cardiovascular: Negative  Psychiatric/Behavioral: The patient is not nervous/anxious  Physical Exam   Constitutional: She appears well-nourished  No distress  Pulmonary/Chest: Effort normal    Psychiatric: She has a normal mood and affect  Her behavior is normal  Judgment and thought content normal         I spent 15 minutes with the patient during this visit

## 2020-03-24 NOTE — ASSESSMENT & PLAN NOTE
Was slightly elevated  Will recheck labs when [facility] outbreak calms down

## 2020-03-24 NOTE — ASSESSMENT & PLAN NOTE
Patient has been doing well on her medication  Dose of Lexapro decreased from 10-5 mg daily    Patient will take the 5 mg daily for at least a month    If she is feeling well and she may take it every other day for at least 2 weeks before stopping the medication

## 2020-06-04 DIAGNOSIS — Z13.6 SCREENING FOR CARDIOVASCULAR CONDITION: ICD-10-CM

## 2020-06-04 DIAGNOSIS — R73.09 ABNORMAL GLUCOSE: Primary | ICD-10-CM

## 2020-06-19 LAB
ALBUMIN SERPL-MCNC: 4.2 G/DL (ref 3.9–5)
ALBUMIN/GLOB SERPL: 1.4 {RATIO} (ref 1.2–2.2)
ALP SERPL-CCNC: 51 IU/L (ref 39–117)
ALT SERPL-CCNC: 19 IU/L (ref 0–32)
AST SERPL-CCNC: 20 IU/L (ref 0–40)
BILIRUB SERPL-MCNC: 0.3 MG/DL (ref 0–1.2)
BUN SERPL-MCNC: 10 MG/DL (ref 6–20)
BUN/CREAT SERPL: 14 (ref 9–23)
CALCIUM SERPL-MCNC: 9.4 MG/DL (ref 8.7–10.2)
CHLORIDE SERPL-SCNC: 102 MMOL/L (ref 96–106)
CHOLEST SERPL-MCNC: 234 MG/DL (ref 100–199)
CO2 SERPL-SCNC: 22 MMOL/L (ref 20–29)
CREAT SERPL-MCNC: 0.71 MG/DL (ref 0.57–1)
EST. AVERAGE GLUCOSE BLD GHB EST-MCNC: 108 MG/DL
GLOBULIN SER-MCNC: 2.9 G/DL (ref 1.5–4.5)
GLUCOSE SERPL-MCNC: 90 MG/DL (ref 65–99)
HBA1C MFR BLD: 5.4 % (ref 4.8–5.6)
HDLC SERPL-MCNC: 52 MG/DL
LDLC SERPL CALC-MCNC: 146 MG/DL (ref 0–99)
LDLC/HDLC SERPL: 2.8 RATIO (ref 0–3.2)
MICRODELETION SYND BLD/T FISH: NORMAL
POTASSIUM SERPL-SCNC: 4.5 MMOL/L (ref 3.5–5.2)
PROT SERPL-MCNC: 7.1 G/DL (ref 6–8.5)
SL AMB EGFR AFRICAN AMERICAN: 133 ML/MIN/1.73
SL AMB EGFR NON AFRICAN AMERICAN: 115 ML/MIN/1.73
SL AMB VLDL CHOLESTEROL CALC: 36 MG/DL (ref 5–40)
SODIUM SERPL-SCNC: 139 MMOL/L (ref 134–144)
TRIGL SERPL-MCNC: 181 MG/DL (ref 0–149)

## 2020-06-25 ENCOUNTER — TELEPHONE (OUTPATIENT)
Dept: FAMILY MEDICINE CLINIC | Facility: CLINIC | Age: 30
End: 2020-06-25

## 2020-06-29 ENCOUNTER — TELEMEDICINE (OUTPATIENT)
Dept: FAMILY MEDICINE CLINIC | Facility: CLINIC | Age: 30
End: 2020-06-29
Payer: COMMERCIAL

## 2020-06-29 DIAGNOSIS — F41.1 GENERALIZED ANXIETY DISORDER: Primary | ICD-10-CM

## 2020-06-29 DIAGNOSIS — R73.09 ABNORMAL GLUCOSE: ICD-10-CM

## 2020-06-29 PROCEDURE — 99213 OFFICE O/P EST LOW 20 MIN: CPT | Performed by: FAMILY MEDICINE

## 2020-11-04 ENCOUNTER — TELEPHONE (OUTPATIENT)
Dept: FAMILY MEDICINE CLINIC | Facility: CLINIC | Age: 30
End: 2020-11-04

## 2021-01-13 ENCOUNTER — TELEMEDICINE (OUTPATIENT)
Dept: FAMILY MEDICINE CLINIC | Facility: CLINIC | Age: 31
End: 2021-01-13
Payer: COMMERCIAL

## 2021-01-13 DIAGNOSIS — B34.9 VIRAL INFECTION, UNSPECIFIED: ICD-10-CM

## 2021-01-13 DIAGNOSIS — Z20.822 EXPOSURE TO COVID-19 VIRUS: ICD-10-CM

## 2021-01-13 DIAGNOSIS — Z20.822 EXPOSURE TO COVID-19 VIRUS: Primary | ICD-10-CM

## 2021-01-13 DIAGNOSIS — Z3A.25 25 WEEKS GESTATION OF PREGNANCY: ICD-10-CM

## 2021-01-13 PROCEDURE — 1036F TOBACCO NON-USER: CPT | Performed by: NURSE PRACTITIONER

## 2021-01-13 PROCEDURE — U0005 INFEC AGEN DETEC AMPLI PROBE: HCPCS

## 2021-01-13 PROCEDURE — 99214 OFFICE O/P EST MOD 30 MIN: CPT | Performed by: NURSE PRACTITIONER

## 2021-01-13 PROCEDURE — U0003 INFECTIOUS AGENT DETECTION BY NUCLEIC ACID (DNA OR RNA); SEVERE ACUTE RESPIRATORY SYNDROME CORONAVIRUS 2 (SARS-COV-2) (CORONAVIRUS DISEASE [COVID-19]), AMPLIFIED PROBE TECHNIQUE, MAKING USE OF HIGH THROUGHPUT TECHNOLOGIES AS DESCRIBED BY CMS-2020-01-R: HCPCS

## 2021-01-13 NOTE — PROGRESS NOTES
COVID-19 Virtual Visit     Assessment/Plan:    Problem List Items Addressed This Visit     None      Visit Diagnoses     Exposure to COVID-19 virus    -  Primary    Relevant Orders    Novel Coronavirus (COVID-19), PCR [facility] - Collected at [facility]    Viral infection, unspecified        Relevant Orders    Novel Coronavirus (COVID-19), PCR [facility] - Collected at [facility]    25 weeks gestation of pregnancy             Disposition:     I referred patient to one of our centralized sites for a COVID-19 swab  I have spent 8 minutes directly with the patient  Encounter provider BETTY Avalos    Provider located at  [address]  [address] [address]    Recent Visits  No visits were found meeting these conditions  Showing recent visits within past 7 days and meeting all other requirements     Today's Visits  Date Type Provider Dept   01/13/21 Telemedicine Delfin Avalos today's visits and meeting all other requirements     Future Appointments  No visits were found meeting these conditions  Showing future appointments within next 150 days and meeting all other requirements        Patient agrees to participate in a virtual check in via telephone or video visit instead of presenting to the office to address urgent/immediate medical needs  Patient is aware this is a billable service  After connecting through [facility], the patient was identified by name and date of birth  Hunter Samuel was informed that this was a telemedicine visit and that the exam was being conducted confidentially over secure lines  My office door was closed  Hunter Samuel acknowledged consent and understanding of privacy and security of the telemedicine visit   I informed the patient that I have reviewed her record in Epic and presented the opportunity for her to ask any questions regarding the visit today  The patient agreed to participate  Subjective:   Cynthia Green is a 27 y o  female who is concerned about COVID-19  Patient's symptoms include nasal congestion  Patient denies fever, chills, fatigue, malaise, rhinorrhea, sore throat, anosmia, loss of taste, cough, shortness of breath, chest tightness, abdominal pain, nausea, vomiting, diarrhea, myalgias and headaches  Date of exposure: 1/13/2021    Exposure:   Contact with a person who is under investigation (PUI) for or who is positive for COVID-19 within the last 14 days?: Yes    Hospitalized recently for fever and/or lower respiratory symptoms?: No      Currently a healthcare worker that is involved in direct patient care?: No      Works in a special setting where the risk of COVID-19 transmission may be high? (this may include long-term care, correctional and assisted facilities; homeless shelters; assisted-living facilities and group homes ): No      Resident in a special setting where the risk of COVID-19 transmission may be high? (this may include long-term care, correctional and assisted facilities; homeless shelters; assisted-living facilities and group homes ): No      Patient had symptoms on 12/31/2021 of bodyaches, fatigue which were resolved after 24 hours  Patient  started with symptoms on 1/8/2021 and tested for covid-19 and is positive currently  Patient stated that has 1years old and not able to isolate herself from her   Patient started with congestion about month and half ago  Patient is currently 25 weeks pregnant    Working from home    No results found for: Betzaida Lazcano, [address] [facility], [place] [facility]  Past Medical History:   Diagnosis Date    Abnormal weight gain     Candidiasis, mouth     Cellulitis of right arm     Kidney infection     Pre-employment health screening examination 7/24/2018    SIRS (systemic inflammatory response syndrome) (HCC)     Tinea corporis      Past Surgical History: Procedure Laterality Date     SECTION      FOOT SURGERY       No current outpatient medications on file  No current facility-administered medications for this visit  No Known Allergies    Review of Systems   Constitutional: Negative for chills, fatigue and fever  HENT: Positive for congestion  Negative for rhinorrhea and sore throat  Respiratory: Negative for cough, chest tightness and shortness of breath  Gastrointestinal: Negative for abdominal pain, diarrhea, nausea and vomiting  Musculoskeletal: Negative for myalgias  Neurological: Negative for headaches  Objective: There were no vitals filed for this visit  Physical Exam  HENT:      Head: Normocephalic  Right Ear: External ear normal       Left Ear: External ear normal       Nose: Nose normal    Eyes:      Conjunctiva/sclera: Conjunctivae normal    Neck:      Musculoskeletal: Normal range of motion  Pulmonary:      Effort: Pulmonary effort is normal       Comments: No cough and distress noted on video call  Skin:     Comments: No diaphoresis noted on video call   Neurological:      Mental Status: She is alert and oriented to person, place, and time  Psychiatric:         Mood and Affect: Mood normal          Behavior: Behavior normal          Thought Content: Thought content normal          Judgment: Judgment normal        VIRTUAL VISIT DISCLAIMER    Leida Jean acknowledges that she has consented to an online visit or consultation  She understands that the online visit is based solely on information provided by her, and that, in the absence of a face-to-face physical evaluation by the physician, the diagnosis she receives is both limited and provisional in terms of accuracy and completeness  This is not intended to replace a full medical face-to-face evaluation by the physician  Leida Jean understands and accepts these terms

## 2021-01-14 LAB — SARS-COV-2 RNA RESP QL NAA+PROBE: NEGATIVE

## 2021-02-04 NOTE — LETTER
March 19, 2018     Patient: Dagmar Fernandez   YOB: 1990   Date of Visit: 3/19/2018       To Whom It May Concern: It is my medical opinion that Dagmar Fernandez may return to work on 3/22/2018    If you have any questions or concerns, please don't hesitate to call   [phone]         Sincerely,        Eduardo Wan RN    CC: No Recipients Discharged

## 2022-02-01 ENCOUNTER — OFFICE VISIT (OUTPATIENT)
Dept: FAMILY MEDICINE CLINIC | Facility: CLINIC | Age: 32
End: 2022-02-01
Payer: COMMERCIAL

## 2022-02-01 VITALS
HEART RATE: 78 BPM | RESPIRATION RATE: 18 BRPM | OXYGEN SATURATION: 98 % | SYSTOLIC BLOOD PRESSURE: 116 MMHG | BODY MASS INDEX: 39.56 KG/M2 | TEMPERATURE: 98.6 F | HEIGHT: 62 IN | WEIGHT: 215 LBS | DIASTOLIC BLOOD PRESSURE: 80 MMHG

## 2022-02-01 DIAGNOSIS — J01.90 ACUTE SINUSITIS, RECURRENCE NOT SPECIFIED, UNSPECIFIED LOCATION: Primary | ICD-10-CM

## 2022-02-01 PROCEDURE — 99213 OFFICE O/P EST LOW 20 MIN: CPT | Performed by: FAMILY MEDICINE

## 2022-02-01 PROCEDURE — U0005 INFEC AGEN DETEC AMPLI PROBE: HCPCS | Performed by: FAMILY MEDICINE

## 2022-02-01 PROCEDURE — U0003 INFECTIOUS AGENT DETECTION BY NUCLEIC ACID (DNA OR RNA); SEVERE ACUTE RESPIRATORY SYNDROME CORONAVIRUS 2 (SARS-COV-2) (CORONAVIRUS DISEASE [COVID-19]), AMPLIFIED PROBE TECHNIQUE, MAKING USE OF HIGH THROUGHPUT TECHNOLOGIES AS DESCRIBED BY CMS-2020-01-R: HCPCS | Performed by: FAMILY MEDICINE

## 2022-02-01 RX ORDER — AMOXICILLIN 875 MG/1
875 TABLET, COATED ORAL 2 TIMES DAILY
Qty: 14 TABLET | Refills: 0 | Status: SHIPPED | OUTPATIENT
Start: 2022-02-01 | End: 2022-02-08

## 2022-02-01 NOTE — PROGRESS NOTES
Assessment/Plan:       Diagnoses and all orders for this visit:    Acute sinusitis, recurrence not specified, unspecified location  -     COVID Only - Office Collect  - Will start amoxicillin  - Return if symptoms worsen or fail to improve  Subjective:      Patient ID: Bill Gonzalez is a 32 y o  female  Sinusitis  This is a new problem  Episode onset: 1/31/22  The problem is unchanged  There has been no fever  Associated symptoms include congestion, coughing, ear pain, headaches, a hoarse voice, sinus pressure and a sore throat  Pertinent negatives include no chills, diaphoresis, neck pain, shortness of breath, sneezing or swollen glands  The treatment provided no relief  The following portions of the patient's history were reviewed and updated as appropriate: allergies, current medications, past family history, past medical history, past social history, past surgical history and problem list     Review of Systems   Constitutional: Negative for chills and diaphoresis  HENT: Positive for congestion, ear pain, hoarse voice, sinus pressure and sore throat  Negative for sneezing  Respiratory: Positive for cough  Negative for shortness of breath  Musculoskeletal: Negative for neck pain  Neurological: Positive for headaches  Objective:      /80   Pulse 78   Temp 98 6 °F (37 °C)   Resp 18   Ht 5' 2" (1 575 m)   Wt 97 5 kg (215 lb)   SpO2 98%   Breastfeeding Yes   BMI 39 32 kg/m²          Physical Exam  Constitutional:       General: She is not in acute distress  Appearance: She is well-developed  She is not diaphoretic  HENT:      Head: Normocephalic and atraumatic  Right Ear: Tympanic membrane, ear canal and external ear normal  There is no impacted cerumen  Left Ear: Tympanic membrane, ear canal and external ear normal  There is no impacted cerumen  Nose: Nose normal  No congestion or rhinorrhea        Mouth/Throat:      Mouth: Mucous membranes are moist       Pharynx: Oropharynx is clear  No oropharyngeal exudate or posterior oropharyngeal erythema  Eyes:      General: No scleral icterus  Right eye: No discharge  Left eye: No discharge  Conjunctiva/sclera: Conjunctivae normal    Cardiovascular:      Rate and Rhythm: Normal rate and regular rhythm  Heart sounds: Normal heart sounds  No murmur heard  No friction rub  No gallop  Pulmonary:      Effort: Pulmonary effort is normal  No respiratory distress  Breath sounds: Normal breath sounds  No wheezing or rales  Chest:      Chest wall: No tenderness  Musculoskeletal:         General: No deformity  Normal range of motion  Cervical back: Normal range of motion and neck supple  Skin:     General: Skin is warm and dry  Neurological:      Mental Status: She is alert and oriented to person, place, and time  Psychiatric:         Behavior: Behavior normal          Thought Content:  Thought content normal          Judgment: Judgment normal

## 2022-02-02 LAB — SARS-COV-2 RNA RESP QL NAA+PROBE: NEGATIVE

## 2022-02-10 DIAGNOSIS — R73.09 ABNORMAL GLUCOSE: Primary | ICD-10-CM

## 2022-02-10 DIAGNOSIS — Z13.0 SCREENING FOR DEFICIENCY ANEMIA: ICD-10-CM

## 2022-02-10 DIAGNOSIS — Z13.6 SCREENING FOR CARDIOVASCULAR CONDITION: ICD-10-CM

## 2022-02-12 PROBLEM — R87.612 LGSIL ON PAP SMEAR OF CERVIX: Status: ACTIVE | Noted: 2020-11-02

## 2022-02-17 LAB
ALBUMIN SERPL-MCNC: 4.2 G/DL (ref 3.8–4.8)
ALBUMIN/GLOB SERPL: 1.5 {RATIO} (ref 1.2–2.2)
ALP SERPL-CCNC: 77 IU/L (ref 44–121)
ALT SERPL-CCNC: 19 IU/L (ref 0–32)
AST SERPL-CCNC: 21 IU/L (ref 0–40)
BILIRUB SERPL-MCNC: <0.2 MG/DL (ref 0–1.2)
BUN SERPL-MCNC: 15 MG/DL (ref 6–20)
BUN/CREAT SERPL: 21 (ref 9–23)
CALCIUM SERPL-MCNC: 9.5 MG/DL (ref 8.7–10.2)
CHLORIDE SERPL-SCNC: 101 MMOL/L (ref 96–106)
CHOLEST SERPL-MCNC: 224 MG/DL (ref 100–199)
CO2 SERPL-SCNC: 23 MMOL/L (ref 20–29)
CREAT SERPL-MCNC: 0.73 MG/DL (ref 0.57–1)
ERYTHROCYTE [DISTWIDTH] IN BLOOD BY AUTOMATED COUNT: 13.2 % (ref 11.7–15.4)
EST. AVERAGE GLUCOSE BLD GHB EST-MCNC: 111 MG/DL
GLOBULIN SER-MCNC: 2.8 G/DL (ref 1.5–4.5)
GLUCOSE SERPL-MCNC: 102 MG/DL (ref 65–99)
HBA1C MFR BLD: 5.5 % (ref 4.8–5.6)
HCT VFR BLD AUTO: 41.7 % (ref 34–46.6)
HDLC SERPL-MCNC: 59 MG/DL
HGB BLD-MCNC: 14 G/DL (ref 11.1–15.9)
LDLC SERPL CALC-MCNC: 150 MG/DL (ref 0–99)
LDLC/HDLC SERPL: 2.5 RATIO (ref 0–3.2)
MCH RBC QN AUTO: 29.6 PG (ref 26.6–33)
MCHC RBC AUTO-ENTMCNC: 33.6 G/DL (ref 31.5–35.7)
MCV RBC AUTO: 88 FL (ref 79–97)
MICRODELETION SYND BLD/T FISH: NORMAL
PLATELET # BLD AUTO: 296 X10E3/UL (ref 150–450)
POTASSIUM SERPL-SCNC: 4.9 MMOL/L (ref 3.5–5.2)
PROT SERPL-MCNC: 7 G/DL (ref 6–8.5)
RBC # BLD AUTO: 4.73 X10E6/UL (ref 3.77–5.28)
SL AMB EGFR AFRICAN AMERICAN: 127 ML/MIN/1.73
SL AMB EGFR NON AFRICAN AMERICAN: 110 ML/MIN/1.73
SL AMB VLDL CHOLESTEROL CALC: 15 MG/DL (ref 5–40)
SODIUM SERPL-SCNC: 139 MMOL/L (ref 134–144)
TRIGL SERPL-MCNC: 85 MG/DL (ref 0–149)
WBC # BLD AUTO: 7.5 X10E3/UL (ref 3.4–10.8)

## 2022-02-21 ENCOUNTER — OFFICE VISIT (OUTPATIENT)
Dept: FAMILY MEDICINE CLINIC | Facility: CLINIC | Age: 32
End: 2022-02-21
Payer: COMMERCIAL

## 2022-02-21 VITALS
SYSTOLIC BLOOD PRESSURE: 106 MMHG | TEMPERATURE: 97.4 F | HEIGHT: 63 IN | DIASTOLIC BLOOD PRESSURE: 68 MMHG | OXYGEN SATURATION: 99 % | RESPIRATION RATE: 16 BRPM | WEIGHT: 214 LBS | BODY MASS INDEX: 37.92 KG/M2 | HEART RATE: 96 BPM

## 2022-02-21 DIAGNOSIS — Z00.00 ANNUAL PHYSICAL EXAM: Primary | ICD-10-CM

## 2022-02-21 PROBLEM — S06.0X0A CONCUSSION WITHOUT LOSS OF CONSCIOUSNESS: Status: RESOLVED | Noted: 2018-03-23 | Resolved: 2022-02-21

## 2022-02-21 PROBLEM — M54.2 MUSCULOSKELETAL NECK PAIN: Status: RESOLVED | Noted: 2018-03-19 | Resolved: 2022-02-21

## 2022-02-21 PROBLEM — F41.1 GENERALIZED ANXIETY DISORDER: Status: RESOLVED | Noted: 2020-02-07 | Resolved: 2022-02-21

## 2022-02-21 PROCEDURE — 99395 PREV VISIT EST AGE 18-39: CPT | Performed by: FAMILY MEDICINE

## 2022-02-21 PROCEDURE — 3725F SCREEN DEPRESSION PERFORMED: CPT | Performed by: FAMILY MEDICINE

## 2022-02-21 PROCEDURE — 1036F TOBACCO NON-USER: CPT | Performed by: FAMILY MEDICINE

## 2022-02-21 PROCEDURE — 3008F BODY MASS INDEX DOCD: CPT | Performed by: FAMILY MEDICINE

## 2022-02-21 NOTE — PROGRESS NOTES
FAMILY PRACTICE HEALTH MAINTENANCE OFFICE VISIT  [facility]    NAME: Barrett Vuong  AGE: 32 y o  SEX: female  : 1990     DATE: 2022    Assessment and Plan     1  Annual physical exam        · Patient Counseling:   · Nutrition: Stressed importance of a well balanced diet, moderation of sodium/saturated fat, caloric balance and sufficient intake of fiber  · Exercise: Stressed the importance of regular exercise with a goal of 150 minutes per week  · Dental Health: Discussed daily flossing and brushing and regular dental visits     · Immunizations reviewed: COVID vaccine recommended  · Discussed benefits of:  Cervical Cancer screening and Screening labs   BMI Counseling: Body mass index is 37 91 kg/m²  Discussed with patient's BMI with her  The BMI is above normal  Exercise recommendations include exercising 3-5 times per week  Return in about 1 year (around 2023) for Annual physical         Chief Complaint     Chief Complaint   Patient presents with    Physical Exam     mz cma       History of Present Illness     She is feeling well  She is learning to appreciate what she has          Well Adult Physical   Patient here for a comprehensive physical exam       Diet and Physical Activity  Diet: well balanced diet  Exercise: frequently      Depression Screen  PHQ-2/9 Depression Screening    Little interest or pleasure in doing things: 0 - not at all  Feeling down, depressed, or hopeless: 0 - not at all  PHQ-2 Score: 0  PHQ-2 Interpretation: Negative depression screen          General Health  Hearing: Normal:  bilateral  Vision: wears glasses  Dental: regular dental visits    Reproductive Health  Follows with gynecologist and has HPV      The following portions of the patient's history were reviewed and updated as appropriate: allergies, current medications, past family history, past medical history, past social history, past surgical history and problem list     Review of Systems     Review of Systems   Constitutional: Negative  Respiratory: Negative  Cardiovascular: Negative          Past Medical History     Past Medical History:   Diagnosis Date    Abnormal weight gain     Candidiasis, mouth     Cellulitis of right arm     Kidney infection     Pre-employment health screening examination 2018    SIRS (systemic inflammatory response syndrome) (HCC)     Tinea corporis        Past Surgical History     Past Surgical History:   Procedure Laterality Date     SECTION      FOOT SURGERY      SALPINGECTOMY Bilateral        Social History     Social History     Socioeconomic History    Marital status: /Civil Union     Spouse name: None    Number of children: None    Years of education: None    Highest education level: None   Occupational History    None   Tobacco Use    Smoking status: Never Smoker    Smokeless tobacco: Never Used   Vaping Use    Vaping Use: Never used   Substance and Sexual Activity    Alcohol use: No    Drug use: No    Sexual activity: None   Other Topics Concern    None   Social History Narrative    None     Social Determinants of Health     Financial Resource Strain: Not on file   Food Insecurity: Not on file   Transportation Needs: Not on file   Physical Activity: Not on file   Stress: Not on file   Social Connections: Not on file   Intimate Partner Violence: Not on file   Housing Stability: Not on file       Family History     Family History   Problem Relation Age of Onset    Diabetes Maternal Grandmother     Cancer Maternal Grandfather     Cancer Paternal Grandfather     Diabetes Mother     No Known Problems Father     Other Half-Sister         ITP    Depression Half-Sister     Learning disabilities Half-Brother     Auditory processing disorder Half-Brother        Current Medications       Current Outpatient Medications:     Prenatal Vit-Fe Fumarate-FA (PRENATAL VITAMIN PO), Take by mouth, Disp: , Rfl:      Allergies     No Known Allergies    Objective     /68   Pulse 96   Temp (!) 97 4 °F (36 3 °C)   Resp 16   Ht 5' 3" (1 6 m)   Wt 97 1 kg (214 lb)   SpO2 99%   BMI 37 91 kg/m²      Physical Exam  Vitals and nursing note reviewed  Constitutional:       Appearance: She is well-developed  HENT:      Head: Normocephalic and atraumatic  Right Ear: Tympanic membrane and external ear normal       Left Ear: Tympanic membrane and external ear normal    Cardiovascular:      Rate and Rhythm: Normal rate and regular rhythm  Heart sounds: Normal heart sounds  No murmur heard  No friction rub  Pulmonary:      Effort: Pulmonary effort is normal  No respiratory distress  Breath sounds: Normal breath sounds  No wheezing or rales  Abdominal:      General: There is no distension  Palpations: Abdomen is soft  There is no mass  Tenderness: There is no abdominal tenderness  There is no guarding or rebound  Musculoskeletal:      Cervical back: Normal range of motion  Right lower leg: No edema  Left lower leg: No edema  Skin:     General: Skin is warm  Neurological:      Mental Status: She is alert and oriented to person, place, and time     Psychiatric:         Mood and Affect: Mood normal             Visual Acuity Screening    Right eye Left eye Both eyes   Without correction:      With correction: 20/15 20/15 [address] [place]

## 2022-05-11 ENCOUNTER — TELEPHONE (OUTPATIENT)
Dept: ADMINISTRATIVE | Facility: OTHER | Age: 32
End: 2022-05-11

## 2022-05-11 ENCOUNTER — OFFICE VISIT (OUTPATIENT)
Dept: FAMILY MEDICINE CLINIC | Facility: CLINIC | Age: 32
End: 2022-05-11
Payer: COMMERCIAL

## 2022-05-11 VITALS
TEMPERATURE: 98.7 F | WEIGHT: 216 LBS | HEART RATE: 88 BPM | SYSTOLIC BLOOD PRESSURE: 130 MMHG | HEIGHT: 63 IN | DIASTOLIC BLOOD PRESSURE: 76 MMHG | OXYGEN SATURATION: 98 % | BODY MASS INDEX: 38.27 KG/M2 | RESPIRATION RATE: 18 BRPM

## 2022-05-11 DIAGNOSIS — H66.93 ACUTE BILATERAL OTITIS MEDIA: Primary | ICD-10-CM

## 2022-05-11 PROCEDURE — 3008F BODY MASS INDEX DOCD: CPT | Performed by: FAMILY MEDICINE

## 2022-05-11 PROCEDURE — 99213 OFFICE O/P EST LOW 20 MIN: CPT | Performed by: FAMILY MEDICINE

## 2022-05-11 PROCEDURE — 1036F TOBACCO NON-USER: CPT | Performed by: FAMILY MEDICINE

## 2022-05-11 RX ORDER — FLUTICASONE PROPIONATE 50 MCG
1 SPRAY, SUSPENSION (ML) NASAL DAILY
COMMUNITY

## 2022-05-11 RX ORDER — AMOXICILLIN 875 MG/1
875 TABLET, COATED ORAL 2 TIMES DAILY
Qty: 14 TABLET | Refills: 0 | Status: SHIPPED | OUTPATIENT
Start: 2022-05-11 | End: 2022-05-18

## 2022-05-11 NOTE — TELEPHONE ENCOUNTER
----- Message from [facility] sent at 5/11/2022 11:29 AM EDT -----  05/11/22 11:30 AM    Hello, our patient Kelsi Starr has had Pap Smear (HPV) aka Cervical Cancer Screening completed/performed  Please assist in updating the patient chart by pulling the document from encounterTab within Chart Review  The date of service is [id number] [facility]       Thank you,  Hallie Coreas  [facility]

## 2022-05-11 NOTE — PROGRESS NOTES
Assessment/Plan:       Diagnoses and all orders for this visit:    Acute bilateral otitis media  -     amoxicillin (AMOXIL) 875 mg tablet; Take 1 tablet (875 mg total) by mouth in the morning and 1 tablet (875 mg total) in the evening  Do all this for 7 days   - Return if symptoms worsen or fail to improve  Subjective:      Patient ID: Kimberly Nunez is a 32 y o  female  Earache   There is pain in both ears  This is a new problem  The current episode started in the past 7 days  The problem has been unchanged  There has been no fever  Associated symptoms include hearing loss  Pertinent negatives include no abdominal pain, coughing, diarrhea, ear discharge, headaches, neck pain, rash, rhinorrhea, sore throat or vomiting  She has tried nothing for the symptoms  The following portions of the patient's history were reviewed and updated as appropriate: allergies, current medications, past family history, past medical history, past social history, past surgical history and problem list     Review of Systems   HENT: Positive for ear pain and hearing loss  Negative for ear discharge, rhinorrhea and sore throat  Respiratory: Negative for cough  Gastrointestinal: Negative for abdominal pain, diarrhea and vomiting  Musculoskeletal: Negative for neck pain  Skin: Negative for rash  Neurological: Negative for headaches  Objective:      /76   Pulse 88   Temp 98 7 °F (37 1 °C)   Resp 18   Ht 5' 3" (1 6 m)   Wt 98 kg (216 lb)   LMP 04/12/2022 (Approximate)   SpO2 98%   BMI 38 26 kg/m²          Physical Exam  Constitutional:       General: She is not in acute distress  Appearance: She is well-developed  She is not diaphoretic  HENT:      Head: Normocephalic and atraumatic  Right Ear: Ear canal and external ear normal  There is no impacted cerumen  Tympanic membrane is erythematous  Left Ear: Ear canal and external ear normal  Decreased hearing noted   Swelling present  A middle ear effusion is present  There is no impacted cerumen  Tympanic membrane is erythematous  Nose: Nose normal  No congestion or rhinorrhea  Mouth/Throat:      Mouth: Mucous membranes are moist       Pharynx: Oropharynx is clear  No oropharyngeal exudate or posterior oropharyngeal erythema  Eyes:      General: No scleral icterus  Right eye: No discharge  Left eye: No discharge  Conjunctiva/sclera: Conjunctivae normal    Cardiovascular:      Rate and Rhythm: Normal rate and regular rhythm  Heart sounds: Normal heart sounds  No murmur heard  No friction rub  No gallop  Pulmonary:      Effort: Pulmonary effort is normal  No respiratory distress  Breath sounds: Normal breath sounds  No wheezing or rales  Chest:      Chest wall: No tenderness  Musculoskeletal:         General: No deformity  Normal range of motion  Cervical back: Normal range of motion and neck supple  Skin:     General: Skin is warm and dry  Neurological:      Mental Status: She is alert and oriented to person, place, and time  Psychiatric:         Behavior: Behavior normal          Thought Content:  Thought content normal          Judgment: Judgment normal

## 2022-05-11 NOTE — TELEPHONE ENCOUNTER
Upon review of the In Basket request we were able to locate, review, and update the patient chart as requested for Pap Smear (HPV) aka Cervical Cancer Screening  DOS 9-    Any additional questions or concerns should be emailed to the Practice Liaisons via [email or web address] email, please do not reply via In Basket      Thank you  Deion Carrera MA

## 2022-05-11 NOTE — TELEPHONE ENCOUNTER
Upon review of the In Basket request and the patient's chart, initial outreach has been made via fax, please see Contacts section for details       Thank you  Beba Sanchez MA

## 2022-05-11 NOTE — LETTER
Procedure Request Form: Cervical Cancer Screening Cytology Report 2021      Date Requested: 22  Patient: Fadumo Dumont  Patient : 1990   Referring Provider: Andrew Hart, DO        Date of Procedure ____[id number]______________________       The above patient has informed us that they have completed their   most recent Cervical Cancer Screening at your facility  Please complete   this form and attach all corresponding procedure reports/results  Comments __________________________________________________________  ____________________________________________________________________  ____________________________________________________________________  ____________________________________________________________________    Facility Completing Procedure _________________________________________    Form Completed By (print name) _______________________________________      Signature __________________________________________________________      These reports are needed for  compliance  Please fax this completed form and a copy of the procedure report to our office located at [place] [address] as soon as possible to [phone] attention Charo: Phone [phone]    We thank you for your assistance in treating our mutual patient     (setn to Dr Arabella Mir MD)

## 2022-05-16 NOTE — TELEPHONE ENCOUNTER
Received [facility] GYN Consult note DOS 0- (same documentation present in Encounters tab [facility]) with no externally attached Cervical Cancer Screening Cytology Report

## 2022-05-16 NOTE — TELEPHONE ENCOUNTER
Upon review of the In Basket request we were able to locate, review, and update the patient chart as requested for Pap Smear (HPV) aka Cervical Cancer Screening  Any additional questions or concerns should be emailed to the Practice Liaisons via [email or web address] email, please do not reply via In Basket      Thank you  Lucas Gage MA

## 2022-05-16 NOTE — TELEPHONE ENCOUNTER
As a follow-up, a second attempt has been made for outreach via telephone call, please see Contacts section for details      Thank you  Hanna Jean MA

## 2022-07-16 ENCOUNTER — OFFICE VISIT (OUTPATIENT)
Dept: FAMILY MEDICINE CLINIC | Facility: CLINIC | Age: 32
End: 2022-07-16
Payer: COMMERCIAL

## 2022-07-16 VITALS
SYSTOLIC BLOOD PRESSURE: 104 MMHG | RESPIRATION RATE: 16 BRPM | HEIGHT: 63 IN | TEMPERATURE: 97.6 F | OXYGEN SATURATION: 99 % | WEIGHT: 217 LBS | DIASTOLIC BLOOD PRESSURE: 60 MMHG | BODY MASS INDEX: 38.45 KG/M2 | HEART RATE: 91 BPM

## 2022-07-16 DIAGNOSIS — N61.0 MASTITIS, ACUTE: Primary | ICD-10-CM

## 2022-07-16 PROCEDURE — 99213 OFFICE O/P EST LOW 20 MIN: CPT | Performed by: FAMILY MEDICINE

## 2022-07-16 RX ORDER — FLUCONAZOLE 150 MG/1
150 TABLET ORAL ONCE
Qty: 1 TABLET | Refills: 0 | Status: SHIPPED | OUTPATIENT
Start: 2022-07-16 | End: 2022-07-16

## 2022-07-16 RX ORDER — AMOXICILLIN AND CLAVULANATE POTASSIUM 875; 125 MG/1; MG/1
1 TABLET, FILM COATED ORAL 2 TIMES DAILY
Qty: 20 TABLET | Refills: 0 | Status: SHIPPED | OUTPATIENT
Start: 2022-07-16 | End: 2022-07-26

## 2022-07-25 DIAGNOSIS — N76.0 ACUTE VAGINITIS: Primary | ICD-10-CM

## 2022-07-25 RX ORDER — FLUCONAZOLE 150 MG/1
150 TABLET ORAL ONCE
Qty: 1 TABLET | Refills: 0 | Status: SHIPPED | OUTPATIENT
Start: 2022-07-25 | End: 2022-07-25

## 2022-10-11 ENCOUNTER — OFFICE VISIT (OUTPATIENT)
Dept: OBGYN CLINIC | Facility: CLINIC | Age: 32
End: 2022-10-11
Payer: COMMERCIAL

## 2022-10-11 ENCOUNTER — APPOINTMENT (OUTPATIENT)
Dept: RADIOLOGY | Facility: CLINIC | Age: 32
End: 2022-10-11
Payer: COMMERCIAL

## 2022-10-11 VITALS
SYSTOLIC BLOOD PRESSURE: 124 MMHG | DIASTOLIC BLOOD PRESSURE: 83 MMHG | WEIGHT: 217 LBS | BODY MASS INDEX: 38.45 KG/M2 | HEIGHT: 63 IN | HEART RATE: 88 BPM

## 2022-10-11 DIAGNOSIS — M54.50 LOW BACK PAIN, UNSPECIFIED BACK PAIN LATERALITY, UNSPECIFIED CHRONICITY, UNSPECIFIED WHETHER SCIATICA PRESENT: ICD-10-CM

## 2022-10-11 DIAGNOSIS — M54.42 ACUTE LEFT-SIDED LOW BACK PAIN WITH LEFT-SIDED SCIATICA: Primary | ICD-10-CM

## 2022-10-11 PROCEDURE — 72100 X-RAY EXAM L-S SPINE 2/3 VWS: CPT

## 2022-10-11 PROCEDURE — 99204 OFFICE O/P NEW MOD 45 MIN: CPT | Performed by: ORTHOPAEDIC SURGERY

## 2022-10-11 RX ORDER — CYCLOBENZAPRINE HCL 10 MG
10 TABLET ORAL 3 TIMES DAILY PRN
Qty: 20 TABLET | Refills: 0 | Status: SHIPPED | OUTPATIENT
Start: 2022-10-11

## 2022-10-11 RX ORDER — PREDNISONE 20 MG/1
20 TABLET ORAL
Qty: 15 TABLET | Refills: 0 | Status: SHIPPED | OUTPATIENT
Start: 2022-10-11 | End: 2022-10-16

## 2022-10-11 NOTE — PROGRESS NOTES
Assessment/Plan:  1  Acute left-sided low back pain with left-sided sciatica  XR spine lumbar 2 or 3 views injury    cyclobenzaprine (FLEXERIL) 10 mg tablet    predniSONE 20 mg tablet    Ambulatory referral to Physical Therapy     Adan Lesches has acute low back pain consistent with lumbar radiculopathy  She appears to be having significant discomfort in her back with radiating pain down her left leg after injuring her back this weekend  I recommended oral steroids and muscle relaxers at this time  She will also begin formal physical therapy on her back  She will follow up if the back pain persists and we could consider further imaging if clinically indicated  Subjective:   Roma Salazar is a 28 y o  female who presents to the office for evaluation for the acute onset of low back pain  She states that she did a lot of housework and cleaning this weekend and this ended up causing severe pain in her low back 2 days ago  She had a sudden onset of aching pain in her back and difficulty walking or moving or standing  She has had pain radiating up and down her back but also into her left leg  She has no history of disc herniation but does have a history of back issues  She has never had any formal imaging of her back  She has been doing home treatments with heat and cupping  Her  is an  has been trying to do other treatments which provide some relief for her  She denies any numbness in her legs but does feel pain shooting down the leg  She denies any saddle paresthesias or incontinence  Review of Systems   Constitutional: Negative for chills, fever and unexpected weight change  HENT: Negative for hearing loss, nosebleeds and sore throat  Eyes: Negative for pain, redness and visual disturbance  Respiratory: Negative for cough, shortness of breath and wheezing  Cardiovascular: Negative for chest pain, palpitations and leg swelling     Gastrointestinal: Negative for abdominal pain, nausea and vomiting  Endocrine: Negative for polydipsia and polyuria  Genitourinary: Negative for dysuria and hematuria  Musculoskeletal:        See HPI   Skin: Negative for rash and wound  Neurological: Negative for dizziness, numbness and headaches  Psychiatric/Behavioral: Negative for decreased concentration and suicidal ideas  The patient is not nervous/anxious  Past Medical History:   Diagnosis Date   • Abnormal weight gain    • Candidiasis, mouth    • Cellulitis of right arm    • Kidney infection    • Pre-employment health screening examination 2018   • SIRS (systemic inflammatory response syndrome) (HCC)    • Tinea corporis        Past Surgical History:   Procedure Laterality Date   •  SECTION     • FOOT SURGERY     • SALPINGECTOMY Bilateral        Family History   Problem Relation Age of Onset   • Diabetes Maternal Grandmother    • Cancer Maternal Grandfather    • Cancer Paternal Grandfather    • Diabetes Mother    • No Known Problems Father    • Other Half-Sister         ITP   • Depression Half-Sister    • Learning disabilities Half-Brother    • Auditory processing disorder Half-Brother        Social History     Occupational History   • Not on file   Tobacco Use   • Smoking status: Never Smoker   • Smokeless tobacco: Never Used   Vaping Use   • Vaping Use: Never used   Substance and Sexual Activity   • Alcohol use: No   • Drug use: No   • Sexual activity: Not on file         Current Outpatient Medications:   •  fluticasone (FLONASE) 50 mcg/act nasal spray, 1 spray into each nostril daily, Disp: , Rfl:     No Known Allergies    Objective:  Vitals:    10/11/22 1422   BP: 124/83   Pulse: 88       Back Exam     Tenderness   The patient is experiencing tenderness in the lumbar      Range of Motion   Extension: normal   Flexion: normal     Muscle Strength   Right Quadriceps:  5/5   Left Quadriceps:  5/5   Right Hamstrings:  5/5   Left Hamstrings:  5/5     Tests Straight leg raise right: negative  Straight leg raise left: positive at 80 deg    Reflexes   Patellar: normal    Other   Sensation: normal  Gait: normal   Erythema: no back redness            Physical Exam  Vitals and nursing note reviewed  Constitutional:       Appearance: She is well-developed  HENT:      Head: Normocephalic and atraumatic  Eyes:      General: No scleral icterus  Extraocular Movements: Extraocular movements intact  Conjunctiva/sclera: Conjunctivae normal    Cardiovascular:      Rate and Rhythm: Normal rate  Pulmonary:      Effort: Pulmonary effort is normal  No respiratory distress  Musculoskeletal:      Cervical back: Normal range of motion and neck supple  Lumbar back: Positive left straight leg raise test  Negative right straight leg raise test       Comments: As noted in HPI   Skin:     General: Skin is warm and dry  Neurological:      Mental Status: She is alert and oriented to person, place, and time  Psychiatric:         Behavior: Behavior normal          I have personally reviewed pertinent films in PACS and my interpretation is as follows:  X-rays of the lumbar spine demonstrate no evidence of acute fracture or significant degenerative change  This document was created using speech voice recognition software  Grammatical errors, random word insertions, pronoun errors, and incomplete sentences are an occasional consequence of this system due to software limitations, ambient noise, and hardware issues  Any formal questions or concerns about content, text, or information contained within the body of this dictation should be directly addressed to the provider for clarification

## 2022-11-19 ENCOUNTER — OFFICE VISIT (OUTPATIENT)
Dept: FAMILY MEDICINE CLINIC | Facility: CLINIC | Age: 32
End: 2022-11-19

## 2022-11-19 VITALS
SYSTOLIC BLOOD PRESSURE: 104 MMHG | WEIGHT: 212 LBS | BODY MASS INDEX: 37.56 KG/M2 | DIASTOLIC BLOOD PRESSURE: 70 MMHG | RESPIRATION RATE: 20 BRPM | HEART RATE: 120 BPM | TEMPERATURE: 101.1 F | HEIGHT: 63 IN

## 2022-11-19 DIAGNOSIS — J02.0 ACUTE STREPTOCOCCAL PHARYNGITIS: Primary | ICD-10-CM

## 2022-11-19 LAB — S PYO AG THROAT QL: POSITIVE

## 2022-11-19 RX ORDER — AMOXICILLIN 875 MG/1
875 TABLET, COATED ORAL 2 TIMES DAILY
Qty: 20 TABLET | Refills: 0 | Status: SHIPPED | OUTPATIENT
Start: 2022-11-19 | End: 2022-11-19

## 2022-11-19 RX ORDER — AMOXICILLIN 875 MG/1
875 TABLET, COATED ORAL 2 TIMES DAILY
Qty: 20 TABLET | Refills: 0 | Status: SHIPPED | OUTPATIENT
Start: 2022-11-19 | End: 2022-11-29

## 2022-11-19 RX ORDER — PROMETHAZINE HYDROCHLORIDE AND CODEINE PHOSPHATE 6.25; 1 MG/5ML; MG/5ML
5 SYRUP ORAL
Qty: 120 ML | Refills: 0 | Status: SHIPPED | OUTPATIENT
Start: 2022-11-19 | End: 2022-11-23

## 2022-11-19 RX ORDER — PREDNISONE 50 MG/1
50 TABLET ORAL DAILY
Qty: 5 TABLET | Refills: 0 | Status: SHIPPED | OUTPATIENT
Start: 2022-11-19

## 2022-11-19 RX ORDER — CEFTRIAXONE 1 G/1
1000 INJECTION, POWDER, FOR SOLUTION INTRAMUSCULAR; INTRAVENOUS EVERY 24 HOURS
Status: DISCONTINUED | OUTPATIENT
Start: 2022-11-19 | End: 2022-11-19

## 2022-11-19 RX ORDER — CEFTRIAXONE 1 G/1
1000 INJECTION, POWDER, FOR SOLUTION INTRAMUSCULAR; INTRAVENOUS ONCE
Status: COMPLETED | OUTPATIENT
Start: 2022-11-19 | End: 2022-11-19

## 2022-11-19 RX ADMIN — CEFTRIAXONE 1000 MG: 1 INJECTION, POWDER, FOR SOLUTION INTRAMUSCULAR; INTRAVENOUS at 12:00

## 2022-11-19 RX ADMIN — CEFTRIAXONE 1000 MG: 1 INJECTION, POWDER, FOR SOLUTION INTRAMUSCULAR; INTRAVENOUS at 12:29

## 2022-11-19 NOTE — PROGRESS NOTES
Assessment/Plan:    Unable to swallow pills given degree of swelling  Rocephin given in office  To start Prednisone today and PO abx tomorrow  Reviewed symptomatic treatment  F/u prn    1  Acute streptococcal pharyngitis  -     predniSONE 50 mg tablet; Take 1 tablet (50 mg total) by mouth daily  -     promethazine-codeine (PHENERGAN WITH CODEINE) 6 25-10 mg/5 mL syrup; Take 5 mL by mouth daily at bedtime as needed for cough for up to 4 days  -     POCT rapid strepA  -     amoxicillin (AMOXIL) 875 mg tablet; Take 1 tablet (875 mg total) by mouth 2 (two) times a day for 10 days  -     cefTRIAXone (ROCEPHIN) injection 1,000 mg    Recent Results (from the past 24 hour(s))   POCT rapid strepA    Collection Time: 11/19/22 11:50 AM   Result Value Ref Range     RAPID STREP A Positive (A) Negative             There are no Patient Instructions on file for this visit  Return if symptoms worsen or fail to improve  Subjective:      Patient ID: Neal Candelario is a 28 y o  female  Chief Complaint   Patient presents with   • Fever   • Generalized Body Aches   • Sore Throat     Post nasal drip  Started over a week ago initially JMoyleLPN   • Cough       Woke up yesterday with body aches, ear pain, abdominal discomfort, severe sore throat, headache  Unable to swallow  Feels feverish  The following portions of the patient's history were reviewed and updated as appropriate: allergies, current medications, past family history, past medical history, past social history, past surgical history and problem list     Review of Systems   Constitutional: Positive for fever (tactile)  Negative for chills and fatigue  HENT: Positive for congestion and sore throat  Negative for ear pain, postnasal drip, rhinorrhea and sinus pressure  Respiratory: Positive for cough (secondary to PND)  Negative for shortness of breath and wheezing  Cardiovascular: Negative for chest pain     Gastrointestinal: Positive for abdominal pain  Negative for diarrhea, nausea and vomiting  Musculoskeletal: Positive for arthralgias  Skin: Negative for rash  Neurological: Positive for headaches  Current Outpatient Medications   Medication Sig Dispense Refill   • amoxicillin (AMOXIL) 875 mg tablet Take 1 tablet (875 mg total) by mouth 2 (two) times a day for 10 days 20 tablet 0   • fluticasone (FLONASE) 50 mcg/act nasal spray 1 spray into each nostril daily     • predniSONE 50 mg tablet Take 1 tablet (50 mg total) by mouth daily 5 tablet 0   • promethazine-codeine (PHENERGAN WITH CODEINE) 6 25-10 mg/5 mL syrup Take 5 mL by mouth daily at bedtime as needed for cough for up to 4 days 120 mL 0     No current facility-administered medications for this visit  Objective:    /70   Pulse (!) 120   Temp (!) 101 1 °F (38 4 °C)   Resp 20   Ht 5' 3" (1 6 m)   Wt 96 2 kg (212 lb)   LMP 11/14/2022 (Exact Date)   BMI 37 55 kg/m²        Physical Exam  Vitals and nursing note reviewed  Constitutional:       Appearance: She is well-developed and well-nourished  HENT:      Right Ear: Tympanic membrane normal       Left Ear: Tympanic membrane normal       Nose: Nose normal       Mouth/Throat:      Pharynx: Oropharyngeal exudate and posterior oropharyngeal erythema present  Tonsils: Tonsillar exudate present  4+ on the right  4+ on the left  Cardiovascular:      Rate and Rhythm: Normal rate and regular rhythm  Heart sounds: Normal heart sounds  No murmur heard  Pulmonary:      Effort: Pulmonary effort is normal       Breath sounds: Normal breath sounds  Lymphadenopathy:      Cervical: Cervical adenopathy present  Skin:     General: Skin is warm and dry  Neurological:      Mental Status: She is alert     Psychiatric:         Mood and Affect: Mood and affect and mood normal          Behavior: Behavior normal                 Orellana Salt, CRNP

## 2022-12-13 ENCOUNTER — TELEPHONE (OUTPATIENT)
Dept: FAMILY MEDICINE CLINIC | Facility: CLINIC | Age: 32
End: 2022-12-13

## 2022-12-13 ENCOUNTER — OFFICE VISIT (OUTPATIENT)
Dept: FAMILY MEDICINE CLINIC | Facility: CLINIC | Age: 32
End: 2022-12-13

## 2022-12-13 VITALS
OXYGEN SATURATION: 98 % | HEIGHT: 63 IN | TEMPERATURE: 98.3 F | HEART RATE: 104 BPM | BODY MASS INDEX: 37.74 KG/M2 | DIASTOLIC BLOOD PRESSURE: 78 MMHG | SYSTOLIC BLOOD PRESSURE: 112 MMHG | RESPIRATION RATE: 16 BRPM | WEIGHT: 213 LBS

## 2022-12-13 DIAGNOSIS — J03.90 ACUTE TONSILLITIS, UNSPECIFIED ETIOLOGY: Primary | ICD-10-CM

## 2022-12-13 PROBLEM — N92.0 MENORRHAGIA WITH REGULAR CYCLE: Status: ACTIVE | Noted: 2022-05-27

## 2022-12-13 LAB — S PYO AG THROAT QL: POSITIVE

## 2022-12-13 RX ORDER — CLINDAMYCIN HYDROCHLORIDE 150 MG/1
150 CAPSULE ORAL 3 TIMES DAILY
Qty: 30 CAPSULE | Refills: 0 | Status: SHIPPED | OUTPATIENT
Start: 2022-12-13 | End: 2022-12-23

## 2022-12-13 RX ORDER — METHYLPREDNISOLONE 4 MG/1
TABLET ORAL
Qty: 21 TABLET | Refills: 0 | Status: SHIPPED | OUTPATIENT
Start: 2022-12-13 | End: 2022-12-19

## 2022-12-13 RX ORDER — FLUCONAZOLE 150 MG/1
150 TABLET ORAL ONCE
Qty: 1 TABLET | Refills: 1 | Status: SHIPPED | OUTPATIENT
Start: 2022-12-13 | End: 2022-12-13

## 2022-12-13 NOTE — PROGRESS NOTES
Assessment/Plan:    No problem-specific Assessment & Plan notes found for this encounter  Acute tonsillitis  Equivocal positive strep  abx for strep infection vs colonization  Gargle  otc  F/u if no better     Diagnoses and all orders for this visit:    Acute tonsillitis, unspecified etiology  -     clindamycin (CLEOCIN) 150 mg capsule; Take 1 capsule (150 mg total) by mouth 3 (three) times a day for 10 days  -     methylPREDNISolone 4 MG tablet therapy pack; Use as directed on package  -     fluconazole (DIFLUCAN) 150 mg tablet; Take 1 tablet (150 mg total) by mouth once for 1 dose  -     POCT rapid strepA    BMI 37 0-37 9, adult        No follow-ups on file  Subjective:      Patient ID: Tulio Barrios is a 28 y o  female  Chief Complaint   Patient presents with   • Sore Throat     Nancy Erik         HPI  Finished amoxil  Better from 11/19/22 episode    New sx since yesterday  Severe sore throat   No fever  Bad headache  Body aches  Sinus pain  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    Breathing ok  Took some left over amox from different illness    The following portions of the patient's history were reviewed and updated as appropriate: allergies, current medications, past family history, past medical history, past social history, past surgical history and problem list     Review of Systems   Respiratory: Negative for shortness of breath and wheezing  Current Outpatient Medications   Medication Sig Dispense Refill   • clindamycin (CLEOCIN) 150 mg capsule Take 1 capsule (150 mg total) by mouth 3 (three) times a day for 10 days 30 capsule 0   • fluticasone (FLONASE) 50 mcg/act nasal spray 1 spray into each nostril daily     • methylPREDNISolone 4 MG tablet therapy pack Use as directed on package 21 tablet 0     No current facility-administered medications for this visit         Objective:    /78   Pulse 104   Temp 98 3 °F (36 8 °C)   Resp 16   Ht 5' 3" (1 6 m) Wt 96 6 kg (213 lb)   LMP 11/14/2022 (Exact Date)   SpO2 98%   BMI 37 73 kg/m²        Physical Exam  Vitals and nursing note reviewed  Constitutional:       General: She is not in acute distress  Appearance: She is well-developed  She is not ill-appearing  HENT:      Head: Normocephalic  Right Ear: Tympanic membrane normal       Left Ear: Tympanic membrane normal       Mouth/Throat:      Pharynx: No oropharyngeal exudate or posterior oropharyngeal erythema  Comments: B/l enlarged tonsils  No stenosis  Eyes:      General: No scleral icterus  Conjunctiva/sclera: Conjunctivae normal    Cardiovascular:      Rate and Rhythm: Normal rate and regular rhythm  Heart sounds: No murmur heard  Pulmonary:      Effort: Pulmonary effort is normal  No respiratory distress  Breath sounds: No wheezing  Abdominal:      Palpations: Abdomen is soft  Musculoskeletal:         General: No deformity  Cervical back: Neck supple  No rigidity  Lymphadenopathy:      Cervical: No cervical adenopathy  Skin:     General: Skin is warm and dry  Coloration: Skin is not pale  Neurological:      Mental Status: She is alert  Psychiatric:         Behavior: Behavior normal          Thought Content:  Thought content normal                 Clement Bartholomew, DO

## 2022-12-13 NOTE — TELEPHONE ENCOUNTER
Appt made for tonight with Dr Marlen Marr  She knows to continue with Advil, fluids, icy drinks and lozenges to keep her throat moist/hydrated  (she was already taking the Advil for her sore throat) She states that she finished all of her antibiotics from her last visit and then last night, her s/s (sore throat)  started to come back  She Is starting to feel like she did last time   She took an old  amoxicillin this am  She knows to go to the ER if any dysphagia or call back if s/s worsen ROMANAoylGueraPN

## 2022-12-13 NOTE — TELEPHONE ENCOUNTER
----- Message from Ama Mchugh sent at 2022 12:35 PM EST -----  Regarding: Strep Throat  Contact: [phone]  Hi Dr Xavier Miranda! ! A few weeks ago, I had a bad case of strep where my tonsils where touching  It’s back - every symptom is back  I did finish all of my meds  I called to see if more meds can just be prescribed or if I need to come in for an appointment  I did take some old amoxicillin from earlier in the year that wasn’t finished and wasn’t  this morning

## 2022-12-14 PROBLEM — N61.0 MASTITIS, ACUTE: Status: RESOLVED | Noted: 2022-07-16 | Resolved: 2022-12-14

## 2023-02-11 PROBLEM — J03.90 ACUTE TONSILLITIS: Status: RESOLVED | Noted: 2022-12-13 | Resolved: 2023-02-11

## 2023-03-03 DIAGNOSIS — Z13.0 SCREENING FOR DEFICIENCY ANEMIA: ICD-10-CM

## 2023-03-03 DIAGNOSIS — Z13.6 SCREENING FOR CARDIOVASCULAR CONDITION: ICD-10-CM

## 2023-03-03 DIAGNOSIS — R73.09 ABNORMAL GLUCOSE: Primary | ICD-10-CM

## 2023-04-04 LAB
ALBUMIN SERPL-MCNC: 4.3 G/DL (ref 3.8–4.8)
ALBUMIN/GLOB SERPL: 1.7 {RATIO} (ref 1.2–2.2)
ALP SERPL-CCNC: 49 IU/L (ref 44–121)
ALT SERPL-CCNC: 19 IU/L (ref 0–32)
AST SERPL-CCNC: 20 IU/L (ref 0–40)
BILIRUB SERPL-MCNC: <0.2 MG/DL (ref 0–1.2)
BUN SERPL-MCNC: 11 MG/DL (ref 6–20)
BUN/CREAT SERPL: 13 (ref 9–23)
CALCIUM SERPL-MCNC: 9.2 MG/DL (ref 8.7–10.2)
CHLORIDE SERPL-SCNC: 103 MMOL/L (ref 96–106)
CHOLEST SERPL-MCNC: 161 MG/DL (ref 100–199)
CO2 SERPL-SCNC: 26 MMOL/L (ref 20–29)
CREAT SERPL-MCNC: 0.83 MG/DL (ref 0.57–1)
EGFR: 96 ML/MIN/1.73
ERYTHROCYTE [DISTWIDTH] IN BLOOD BY AUTOMATED COUNT: 13 % (ref 11.7–15.4)
EST. AVERAGE GLUCOSE BLD GHB EST-MCNC: 117 MG/DL
GLOBULIN SER-MCNC: 2.6 G/DL (ref 1.5–4.5)
GLUCOSE SERPL-MCNC: 98 MG/DL (ref 70–99)
HBA1C MFR BLD: 5.7 % (ref 4.8–5.6)
HCT VFR BLD AUTO: 40.3 % (ref 34–46.6)
HDLC SERPL-MCNC: 38 MG/DL
HGB BLD-MCNC: 13.4 G/DL (ref 11.1–15.9)
LDLC SERPL CALC-MCNC: 97 MG/DL (ref 0–99)
LDLC/HDLC SERPL: 2.6 RATIO (ref 0–3.2)
MCH RBC QN AUTO: 28.8 PG (ref 26.6–33)
MCHC RBC AUTO-ENTMCNC: 33.3 G/DL (ref 31.5–35.7)
MCV RBC AUTO: 87 FL (ref 79–97)
MICRODELETION SYND BLD/T FISH: NORMAL
PLATELET # BLD AUTO: 342 X10E3/UL (ref 150–450)
POTASSIUM SERPL-SCNC: 4.7 MMOL/L (ref 3.5–5.2)
PROT SERPL-MCNC: 6.9 G/DL (ref 6–8.5)
RBC # BLD AUTO: 4.66 X10E6/UL (ref 3.77–5.28)
SL AMB VLDL CHOLESTEROL CALC: 26 MG/DL (ref 5–40)
SODIUM SERPL-SCNC: 141 MMOL/L (ref 134–144)
TRIGL SERPL-MCNC: 144 MG/DL (ref 0–149)
WBC # BLD AUTO: 7.2 X10E3/UL (ref 3.4–10.8)

## 2023-04-11 PROBLEM — F41.9 ANXIETY: Status: ACTIVE | Noted: 2023-04-11

## 2023-04-21 ENCOUNTER — APPOINTMENT (OUTPATIENT)
Dept: RADIOLOGY | Facility: CLINIC | Age: 33
End: 2023-04-21

## 2023-04-21 DIAGNOSIS — S69.91XA FINGER INJURY, RIGHT, INITIAL ENCOUNTER: ICD-10-CM

## 2023-05-19 ENCOUNTER — OFFICE VISIT (OUTPATIENT)
Dept: FAMILY MEDICINE CLINIC | Facility: CLINIC | Age: 33
End: 2023-05-19

## 2023-05-19 VITALS
HEART RATE: 82 BPM | WEIGHT: 226 LBS | SYSTOLIC BLOOD PRESSURE: 112 MMHG | RESPIRATION RATE: 16 BRPM | TEMPERATURE: 96.3 F | HEIGHT: 62 IN | OXYGEN SATURATION: 99 % | BODY MASS INDEX: 41.59 KG/M2 | DIASTOLIC BLOOD PRESSURE: 78 MMHG

## 2023-05-19 DIAGNOSIS — J02.9 ACUTE PHARYNGITIS, UNSPECIFIED ETIOLOGY: Primary | ICD-10-CM

## 2023-05-19 LAB — S PYO AG THROAT QL: NEGATIVE

## 2023-05-19 RX ORDER — AMOXICILLIN 875 MG/1
875 TABLET, COATED ORAL 2 TIMES DAILY
Qty: 20 TABLET | Refills: 0 | Status: SHIPPED | OUTPATIENT
Start: 2023-05-19 | End: 2023-05-29

## 2023-05-19 NOTE — PATIENT INSTRUCTIONS
Amoxicillin (By mouth)   Amoxicillin (q-[id number]-JIMMY-in)  Treats infections or stomach ulcers  This medicine is a penicillin antibiotic  Brand Name(s): Moxatag, Prevpac   There may be other brand names for this medicine  When This Medicine Should Not Be Used: This medicine is not right for everyone  You should not use it if you had an allergic reaction to amoxicillin, any type of penicillin, or a cephalosporin antibiotic  How to Use This Medicine:   Capsule, Liquid, Tablet, Chewable Tablet, Long Acting Tablet  Your doctor will tell you how much medicine to use  Do not use more than directed  Chewable tablet: You must chew the tablet before you swallow it  You may crush the tablet and mix the medicine with a small amount of food to make it easier to swallow  Oral liquid: Shake well just before each use  Measure the oral liquid medicine with a marked measuring spoon, oral syringe, or medicine cup  You may mix the oral liquid with a baby formula, milk, fruit juice, water, ginger ale, or another cold drink  Be sure your child drinks all of the mixture right away  Tablet for suspension: Place the tablet in a small drinking glass, and add 2 teaspoons of water  Do not use any other liquid  Gently stir or swirl the water in the glass until the tablet is completely dissolved  Drink all of this mixture right away  Add more water to the glass and drink all of it to make sure you get all of the medicine  Do not chew or swallow the tablet for suspension  Take all of the medicine in your prescription to clear up your infection, even if you feel better after the first few doses  Take a dose as soon as you remember  If it is almost time for your next dose, wait until then and take a regular dose  Do not take extra medicine to make up for a missed dose  Store the tablets, capsules, and tablets for suspension at room temperature, away from heat, moisture, and direct light  Store the oral liquid in the refrigerator   Do not freeze  Throw away any unused medicine after 14 days  Drugs and Foods to Avoid:   Ask your doctor or pharmacist before using any other medicine, including over-the-counter medicines, vitamins, and herbal products  Some medicines can affect how amoxicillin works  Tell your doctor if you are also using any of the following:   Allopurinol  Probenecid  Birth control pills  A blood thinner  Warnings While Using This Medicine:   Tell your doctor if you are pregnant or breastfeeding, or if you have kidney disease, allergies, or a condition called phenylketonuria (PKU)  Tell your doctor if you are on dialysis  This medicine can cause diarrhea  Call your doctor if the diarrhea becomes severe, does not stop, or is bloody  Do not take any medicine to stop diarrhea until you have talked to your doctor  Diarrhea can occur 2 months or more after you stop taking this medicine  Tell any doctor or dentist who treats you that you are using this medicine  This medicine may affect certain medical test results  Call your doctor if your symptoms do not improve or if they get worse  Use this medicine to treat only the infection your doctor has prescribed it for  Do not use this medicine for any infection or condition that has not been checked by a doctor  This medicine will not treat the flu or the common cold  Keep all medicine out of the reach of children  Never share your medicine with anyone  Possible Side Effects While Using This Medicine:   Call your doctor right away if you notice any of these side effects:   Allergic reaction: Itching or hives, swelling in your face or hands, swelling or tingling in your mouth or throat, chest tightness, trouble breathing  Blistering, peeling, or red skin rash  Diarrhea that may contain blood, stomach cramps, fever  If you notice these less serious side effects, talk with your doctor:   Mild diarrhea, nausea, or vomiting  Mild skin rash  If you notice other side effects that you think are caused by this medicine, tell your doctor  Call your doctor for medical advice about side effects  You may report side effects to FDA at 7-611-INQ-4341  © Copyright Phillip Coleman 2022 Information is for End User's use only and may not be sold, redistributed or otherwise used for commercial purposes  The above information is an  only  It is not intended as medical advice for individual conditions or treatments  Talk to your doctor, nurse or pharmacist before following any medical regimen to see if it is safe and effective for you

## 2023-05-19 NOTE — PROGRESS NOTES
"Assessment/Plan:    1  Acute pharyngitis, unspecified etiology  Comments:  will also do decongestant and flonase nasal spray OTC  Orders:  -     amoxicillin (AMOXIL) 875 mg tablet; Take 1 tablet (875 mg total) by mouth 2 (two) times a day for 10 days  -     POCT rapid strepA          Patient Instructions: Take medication with food  It is important that you take the entire course of antibiotics prescribed  May also take a probiotic of your choice to maintain healthy GI artem  Can take some probiotic and yogurt with the medication  Supportive care discussed and advised  Advised to RTO for any worsening and no improvement  Follow up for no improvement and worsening of conditions  Patient advised and educated when to see immediate medical care  Return if symptoms worsen or fail to improve  Future Appointments   Date Time Provider Don Fung   6/26/2023  1:00 PM Ron Goldstein MD [facility] [place] [facility]   9/18/2023  3:00 PM Zoltan Irwin MD [facility]           Subjective:      Patient ID: Maria Luisa Armendariz is a 28 y o  female  Chief Complaint   Patient presents with   • Sore Throat     Started last night  Also having ear pain  Her children are sick  No fever   Zenaida Alexander MA           Vitals:  /78   Pulse 82   Temp (!) 96 3 °F (35 7 °C)   Resp 16   Ht 5' 2\" (1 575 m)   Wt 103 kg (226 lb)   LMP 04/20/2023 (Approximate)   SpO2 99%   BMI 41 34 kg/m²     HPI  Patient stated that having sore throat from couple of days and progressed to ear pain and congestion and feeling fatigue  Denies fever, chills and sob  Tried OTC but not much benefit  The following portions of the patient's history were reviewed and updated as appropriate: allergies, current medications, past family history, past medical history, past social history, past surgical history and problem list       Review of Systems   Constitutional: Positive for fatigue   Negative for chills, " diaphoresis, fever and unexpected weight change  HENT: Positive for congestion, ear pain and sore throat  Negative for dental problem, drooling, ear discharge, facial swelling, hearing loss, mouth sores, nosebleeds, postnasal drip, rhinorrhea, sinus pressure, sinus pain, sneezing, tinnitus, trouble swallowing and voice change  Respiratory: Negative for cough, chest tightness, shortness of breath and wheezing  Cardiovascular: Negative  Gastrointestinal: Negative for abdominal pain, constipation, diarrhea, nausea and vomiting  Musculoskeletal: Negative  Skin: Negative  Neurological: Negative for dizziness, light-headedness and headaches  Objective:    Social History     Tobacco Use   Smoking Status Never   Smokeless Tobacco Never       Allergies: No Known Allergies      Current Outpatient Medications   Medication Sig Dispense Refill   • amoxicillin (AMOXIL) 875 mg tablet Take 1 tablet (875 mg total) by mouth 2 (two) times a day for 10 days 20 tablet 0   • fluticasone (FLONASE) 50 mcg/act nasal spray 1 spray into each nostril daily     • propranolol (INDERAL) 10 mg tablet Take daily as needed for anxiety 30 tablet 1     No current facility-administered medications for this visit  Physical Exam  Vitals reviewed  Constitutional:       Appearance: Normal appearance  She is well-developed  HENT:      Head: Normocephalic  Right Ear: Tympanic membrane, ear canal and external ear normal       Left Ear: Tympanic membrane, ear canal and external ear normal       Nose: Mucosal edema present  Right Sinus: No maxillary sinus tenderness or frontal sinus tenderness  Left Sinus: No maxillary sinus tenderness or frontal sinus tenderness  Mouth/Throat:      Mouth: No oral lesions  Pharynx: Posterior oropharyngeal erythema present  No oropharyngeal exudate  Cardiovascular:      Rate and Rhythm: Normal rate and regular rhythm  Heart sounds: Normal heart sounds  Pulmonary:      Effort: Pulmonary effort is normal       Breath sounds: Normal breath sounds  Musculoskeletal:         General: Normal range of motion  Cervical back: Neck supple  Lymphadenopathy:      Cervical:      Right cervical: No superficial or posterior cervical adenopathy  Left cervical: No superficial or posterior cervical adenopathy  Skin:     General: Skin is warm and dry  Neurological:      Mental Status: She is alert and oriented to person, place, and time  Psychiatric:         Behavior: Behavior normal          Thought Content:  Thought content normal          Judgment: Judgment normal                Recent Results (from the past 24 hour(s))   POCT rapid strepA    Collection Time: 05/19/23 10:46 AM   Result Value Ref Range     RAPID STREP A Negative Negative         BETTY Trujillo

## 2023-06-06 DIAGNOSIS — F41.9 ANXIETY: ICD-10-CM

## 2023-06-06 RX ORDER — PROPRANOLOL HYDROCHLORIDE 10 MG/1
TABLET ORAL
Qty: 30 TABLET | Refills: 1 | Status: SHIPPED | OUTPATIENT
Start: 2023-06-06

## 2023-06-08 ENCOUNTER — OFFICE VISIT (OUTPATIENT)
Dept: FAMILY MEDICINE CLINIC | Facility: CLINIC | Age: 33
End: 2023-06-08
Payer: COMMERCIAL

## 2023-06-08 VITALS
OXYGEN SATURATION: 99 % | HEART RATE: 80 BPM | HEIGHT: 62 IN | TEMPERATURE: 97.9 F | BODY MASS INDEX: 41.3 KG/M2 | SYSTOLIC BLOOD PRESSURE: 120 MMHG | WEIGHT: 224.4 LBS | RESPIRATION RATE: 18 BRPM | DIASTOLIC BLOOD PRESSURE: 84 MMHG

## 2023-06-08 DIAGNOSIS — J06.9 UPPER RESPIRATORY TRACT INFECTION, UNSPECIFIED TYPE: Primary | ICD-10-CM

## 2023-06-08 PROCEDURE — 99213 OFFICE O/P EST LOW 20 MIN: CPT | Performed by: FAMILY MEDICINE

## 2023-06-08 RX ORDER — METHYLPREDNISOLONE 4 MG/1
TABLET ORAL
Qty: 21 EACH | Refills: 0 | Status: SHIPPED | OUTPATIENT
Start: 2023-06-08

## 2023-06-08 RX ORDER — AZITHROMYCIN 250 MG/1
TABLET, FILM COATED ORAL
Qty: 6 TABLET | Refills: 0 | Status: SHIPPED | OUTPATIENT
Start: 2023-06-08 | End: 2023-06-13

## 2023-06-08 NOTE — PROGRESS NOTES
"Assessment/Plan:    1  Upper respiratory tract infection, unspecified type  -     azithromycin (ZITHROMAX) 250 mg tablet; 2 tabs on day 1, 1 tab a day for 4 days after  -     methylPREDNISolone 4 MG tablet therapy pack; Use as directed on package            There are no Patient Instructions on file for this visit  No follow-ups on file  Subjective:      Patient ID: Joanne Cole is a 28 y o  female  Chief Complaint   Patient presents with   • Follow-up   • Sore Throat     Right side of throat  :l Polo/LPN   • Earache     Right ear   • swollen gland     Right side of throat     • tongue discolored     On right side, L  Polo/LPN       Pt sched  for same day appt to follow up pharyngitis  Pt states seh was here a few weeks ago with a sore throat  Strep was negative  States she took her meds never went away  Now everything is on rt side  States her eye is blood shot      The following portions of the patient's history were reviewed and updated as appropriate: allergies, current medications, past family history, past medical history, past social history, past surgical history and problem list     Review of Systems   HENT: Positive for congestion and sore throat  Respiratory: Positive for cough  Current Outpatient Medications   Medication Sig Dispense Refill   • azithromycin (ZITHROMAX) 250 mg tablet 2 tabs on day 1, 1 tab a day for 4 days after 6 tablet 0   • fluticasone (FLONASE) 50 mcg/act nasal spray 1 spray into each nostril daily     • methylPREDNISolone 4 MG tablet therapy pack Use as directed on package 21 each 0   • propranolol (INDERAL) 10 mg tablet TAKE 1 TABLET BY MOUTH DAILY AS NEEDED FOR ANXIETY 30 tablet 1     No current facility-administered medications for this visit         Objective:    /84   Pulse 80   Temp 97 9 °F (36 6 °C) (Tympanic)   Resp 18   Ht 5' 2\" (1 575 m)   Wt 102 kg (224 lb 6 4 oz)   LMP 05/20/2023 (Approximate)   SpO2 99%   BMI 41 04 kg/m²        " Physical Exam  HENT:      Right Ear: Tympanic membrane is erythematous  Left Ear: Tympanic membrane is erythematous  Nose: Congestion and rhinorrhea present  Mouth/Throat:      Pharynx: Posterior oropharyngeal erythema present                  Helon Reddish, DO

## 2023-06-26 ENCOUNTER — OFFICE VISIT (OUTPATIENT)
Dept: OBGYN CLINIC | Facility: CLINIC | Age: 33
End: 2023-06-26
Payer: COMMERCIAL

## 2023-06-26 VITALS — SYSTOLIC BLOOD PRESSURE: 124 MMHG | BODY MASS INDEX: 40.82 KG/M2 | WEIGHT: 223.2 LBS | DIASTOLIC BLOOD PRESSURE: 76 MMHG

## 2023-06-26 DIAGNOSIS — R87.810 CERVICAL HIGH RISK HPV (HUMAN PAPILLOMAVIRUS) TEST POSITIVE: ICD-10-CM

## 2023-06-26 DIAGNOSIS — Z01.419 WOMEN'S ANNUAL ROUTINE GYNECOLOGICAL EXAMINATION: Primary | ICD-10-CM

## 2023-06-26 DIAGNOSIS — N92.1 MENORRHAGIA WITH IRREGULAR CYCLE: ICD-10-CM

## 2023-06-26 PROCEDURE — 99385 PREV VISIT NEW AGE 18-39: CPT | Performed by: STUDENT IN AN ORGANIZED HEALTH CARE EDUCATION/TRAINING PROGRAM

## 2023-06-26 RX ORDER — TRANEXAMIC ACID 650 MG/1
1300 TABLET ORAL EVERY 8 HOURS
Qty: 30 TABLET | Refills: 0 | Status: SHIPPED | OUTPATIENT
Start: 2023-06-26 | End: 2023-07-01

## 2023-06-26 NOTE — PROGRESS NOTES
"[facility]   Annual Well Woman Exam  Peter    ASSESSMENT & PLAN: Naveen Almodovar is a 28 y o   with normal gynecologic exam     1   Routine well woman exam done today  2   Pap and HPV:Pap with HPV was done today  Current ASCCP Guidelines reviewed  3   Zachery Rodriguez is low risk and does not need or desire STI testing  4  Zachery Rodriguez is sexually active  She is using sterilization for contraception   5  The following were reviewed in today's visit: breast self exam and exercise  CBC, TSH, pelvic US, discussed AUB management options--TXA now, considering hysterectomy  If irregular, should have endobx  6  Return to office in 2-3 months for follow-up of menorrhagia    All questions answered to the best of my ability  Subjective:    CC:  Annual Gynecologic Examination    HPI: Naveen Almodovar is a 28 y o   who presents for annual gynecologic examination  GYN  Issues with abnormal pap and colposcopy  --NILM, HPV+  --LSIL  Has had HPV off and on     menorrhagia since   Changing ultra-tampon within the hour  Told to use advil and birth control and \"messed her up\"  Bleeds 7 days (used to be 10 days)    OB  2xCS  S/p BS    G/U  Complaints: denies  Denies hematuria and dysuria    Breast:  had breast biopsy  2021  Family hx:   Distant relatives w/ cervical, colon cancer, breast and ovarian  No first degree relatives  Patient does not do regular self-exams    Health Maintenance:    She does follow with a PCP  She feels safe at home and denies domestic violence    Working on improving--fruits and vegetables, high fiber  Working out with    She does not use tobacco    Past Medical History:   Diagnosis Date   • Abnormal weight gain    • Candidiasis, mouth    • Cellulitis of right arm    • Kidney infection    • Pre-employment health screening examination 2018   • SIRS (systemic inflammatory response syndrome) (HCC)    • Tinea corporis        Past Surgical History:   Procedure " Laterality Date   •  SECTION     • FOOT SURGERY     • SALPINGECTOMY Bilateral        Past OB/Gyn History:     Patient's last menstrual period was 2023 (approximate)  Family History:  Family History   Problem Relation Age of Onset   • Diabetes Mother    • No Known Problems Father    • Diabetes Maternal Grandmother    • Cancer Maternal Grandfather    • Cancer Paternal Grandfather    • Other Half-Sister         ITP   • Depression Half-Sister    • Learning disabilities Half-Brother    • Auditory processing disorder Half-Brother        Social History:  Social History     Socioeconomic History   • Marital status: /Civil Union     Spouse name: Not on file   • Number of children: Not on file   • Years of education: Not on file   • Highest education level: Not on file   Occupational History   • Not on file   Tobacco Use   • Smoking status: Never   • Smokeless tobacco: Never   Vaping Use   • Vaping Use: Never used   Substance and Sexual Activity   • Alcohol use: Yes     Comment: social   • Drug use: No   • Sexual activity: Yes     Partners: Male     Birth control/protection: Female Sterilization   Other Topics Concern   • Not on file   Social History Narrative   • Not on file     Social Determinants of Health     Financial Resource Strain: Not on file   Food Insecurity: Not on file   Transportation Needs: Not on file   Physical Activity: Not on file   Stress: Not on file   Social Connections: Not on file   Intimate Partner Violence: Not on file   Housing Stability: Not on file     Presently lives with two kids,   Patient is     Patient is currently employed     No Known Allergies    Current Outpatient Medications:   •  Tranexamic Acid 650 MG TABS, Take 2 tablets (1,300 mg total) by mouth every 8 (eight) hours for 5 days, Disp: 30 tablet, Rfl: 0  •  fluticasone (FLONASE) 50 mcg/act nasal spray, 1 spray into each nostril daily (Patient not taking: Reported on 2023), Disp: , Rfl:   • propranolol (INDERAL) 10 mg tablet, TAKE 1 TABLET BY MOUTH DAILY AS NEEDED FOR ANXIETY (Patient not taking: Reported on 6/26/2023), Disp: 30 tablet, Rfl: 1    Review of Systems:  Denies chest pain, shortness of breath, abdominal pain, nausea, vomiting, urinary incontinence, urinary frequency, vaginal bleeding, vaginal discharge  All other systems negative unless otherwise stated  Physical Exam:  /76 (BP Location: Left arm, Patient Position: Sitting, Cuff Size: Large)   Wt 101 kg (223 lb 3 2 oz)   LMP 06/14/2023 (Approximate)   Breastfeeding No   BMI 40 82 kg/m²  Body mass index is 40 82 kg/m²  GEN: The patient was alert and oriented x3, pleasant well-appearing female in no acute distress  HEENT:  Unremarkable, no anterior or posterior lymphadenopathy, no thyromegaly  CV:  Regular rate and rhythm, normal S1 and S2, no murmurs  RESP:  Clear to auscultation bilaterally, no wheezes, rales or rhonchi  BREAST:  Symmetric breasts with no palpable breast masses or obvious breast lesions  She has no retractions or nipple discharge  She has no axillary abnormalities or palpable masses  GI:  Soft, nontender, non-distended  MSK: bilateral lower extremities are nontender, no edema  : Normal appearing external female genitalia, normal appearing urethral meatus  On sterile speculum exam, normal appearing vaginal epithelium, no vaginal discharge, no bleeding, grossly normal appearing cervix  On bimanual exam, no cervical motion tenderness; uterus is smooth, mobile, non-tender, normal weeks size  No tenderness or fullness in the bilateral adnexa     Pap collected

## 2023-06-26 NOTE — PATIENT INSTRUCTIONS
Tranexamic acid (By mouth)   Tranexamic Acid (mkon-ns-DS-ik AS-id)  Treats heavy monthly periods (menstrual cycles) in women  Brand Name(s): Jarred   There may be other brand names for this medicine  When This Medicine Should Not Be Used: This medicine is not right for everyone  Do not use if you had an allergic reaction to tranexamic acid, or if you have blood clot problems or a history of blood clots  How to Use This Medicine:   Tablet  Your doctor will tell you how much medicine to use  Do not use more than directed  Start taking this medicine when your monthly period starts  Do not take this medicine for more than 5 days during your period  Do not take more than 6 tablets in one day  Swallow the tablet whole with liquids  Do not break, crush, or chew it  Read and follow the patient instructions that come with this medicine  Talk to your doctor or pharmacist if you have any questions  Missed dose: If you miss a dose or forget to use your medicine, use it as soon as you can  Wait at least 6 hours before you take another dose  Do not use extra medicine to make up for a missed dose  Store the medicine in a closed container at room temperature, away from heat, moisture, and direct light  Drugs and Foods to Avoid:   Ask your doctor or pharmacist before using any other medicine, including over-the-counter medicines, vitamins, and herbal products  Do not use this medicine together with birth control that uses hormones, such as pills or a vaginal ring  Some medicines and foods can affect how tranexamic acid works  Tell your doctor if you are using oral tretinoin or a medicine that changes how your blood clots, such as factor IX  Warnings While Using This Medicine:   Tell your doctor if you are pregnant or breastfeeding, or if you have kidney disease, bleeding problems, or leukemia    This medicine may cause the following problems:  Higher risk of blood clots (which can lead to heart attack or stroke) when used with hormone birth control, such as pills or a patch  Eye and vision problems  If this medicine does not reduce your bleeding after 2 menstrual cycles or if it seems to stop working, check with your doctor  Your doctor will check your progress and the effects of this medicine at regular visits  Keep all appointments  Keep all medicine out of the reach of children  Never share your medicine with anyone  Possible Side Effects While Using This Medicine:   Call your doctor right away if you notice any of these side effects: Allergic reaction: Itching or hives, swelling in your face or hands, swelling or tingling in your mouth or throat, chest tightness, trouble breathing  Chest pain, trouble breathing, or coughing up blood  Numbness or weakness on one side of your body, sudden or severe headache, problems with vision, speech, or walking  Pain in your lower leg  Trouble seeing, vision changes  Unusual bleeding, bruising, or weakness  If you notice these less serious side effects, talk with your doctor:   Muscle, joint, or back pain  Runny or stuffy nose  If you notice other side effects that you think are caused by this medicine, tell your doctor  Call your doctor for medical advice about side effects  You may report side effects to FDA at 0-394-FDA-6830  © Copyright Lisandra Ni 2022 Information is for End User's use only and may not be sold, redistributed or otherwise used for commercial purposes  The above information is an  only  It is not intended as medical advice for individual conditions or treatments  Talk to your doctor, nurse or pharmacist before following any medical regimen to see if it is safe and effective for you

## 2023-06-28 ENCOUNTER — HOSPITAL ENCOUNTER (OUTPATIENT)
Dept: RADIOLOGY | Facility: HOSPITAL | Age: 33
Discharge: HOME | End: 2023-06-28
Attending: STUDENT IN AN ORGANIZED HEALTH CARE EDUCATION/TRAINING PROGRAM
Payer: COMMERCIAL

## 2023-06-28 DIAGNOSIS — N92.1 MENORRHAGIA WITH IRREGULAR CYCLE: ICD-10-CM

## 2023-06-28 PROCEDURE — 76856 US EXAM PELVIC COMPLETE: CPT

## 2023-06-28 PROCEDURE — 76830 TRANSVAGINAL US NON-OB: CPT

## 2023-06-30 DIAGNOSIS — F41.9 ANXIETY: ICD-10-CM

## 2023-06-30 RX ORDER — PROPRANOLOL HYDROCHLORIDE 10 MG/1
TABLET ORAL
Qty: 30 TABLET | Refills: 1 | Status: SHIPPED | OUTPATIENT
Start: 2023-06-30

## 2023-07-01 LAB
ERYTHROCYTE [DISTWIDTH] IN BLOOD BY AUTOMATED COUNT: 13.5 % (ref 11.7–15.4)
HCT VFR BLD AUTO: 38.5 % (ref 34–46.6)
HGB BLD-MCNC: 12.7 G/DL (ref 11.1–15.9)
MCH RBC QN AUTO: 29.3 PG (ref 26.6–33)
MCHC RBC AUTO-ENTMCNC: 33 G/DL (ref 31.5–35.7)
MCV RBC AUTO: 89 FL (ref 79–97)
PLATELET # BLD AUTO: 282 X10E3/UL (ref 150–450)
RBC # BLD AUTO: 4.34 X10E6/UL (ref 3.77–5.28)
TSH SERPL DL<=0.005 MIU/L-ACNC: 1.19 UIU/ML (ref 0.45–4.5)
WBC # BLD AUTO: 4.3 X10E3/UL (ref 3.4–10.8)

## 2023-07-03 LAB
CYTOLOGIST CVX/VAG CYTO: ABNORMAL
DX ICD CODE: ABNORMAL
DX ICD CODE: ABNORMAL
HPV GENOTYPE REFLEX: ABNORMAL
HPV I/H RISK 4 DNA CVX QL PROBE+SIG AMP: POSITIVE
OTHER STN SPEC: ABNORMAL
PATH REPORT.FINAL DX SPEC: ABNORMAL
PATHOLOGIST CVX/VAG CYTO: ABNORMAL
SL AMB NOTE:: ABNORMAL
SL AMB SPECIMEN ADEQUACY: ABNORMAL
SL AMB TEST METHODOLOGY: ABNORMAL

## 2023-07-06 ENCOUNTER — TELEPHONE (OUTPATIENT)
Dept: OBGYN CLINIC | Facility: CLINIC | Age: 33
End: 2023-07-06

## 2023-07-06 NOTE — TELEPHONE ENCOUNTER
Patient called, [facility] requesting to make a follow up appointment with Dr. Dwaine Staley as it was recommended that patient have an IUD, or ultimately hysterectomy. Patient had a pelvic ultrasound completed on 6/28/23.

## 2023-07-06 NOTE — TELEPHONE ENCOUNTER
Placed call to patient, offered her an appointment on 8/24/23 at 1 in the [facility] office off of [address]. Patient declined as she would prfer to be sen sooner. Advised we would keep an eye out for a cancellation on Dr. Jon Blizzard schedule and contact her as soon as an appointment opens up. See 6/26/23 results -   Your pap shows mildly abnormal cells and is positive for HPV. We should get you set up for a colposcopy to perform a more thorough exam of your cervix and collect biopsies if necessary.

## 2023-07-14 DIAGNOSIS — F41.9 ANXIETY: ICD-10-CM

## 2023-07-14 RX ORDER — PROPRANOLOL HYDROCHLORIDE 10 MG/1
TABLET ORAL
Qty: 90 TABLET | Refills: 1 | Status: SHIPPED | OUTPATIENT
Start: 2023-07-14

## 2023-07-24 ENCOUNTER — PROCEDURE VISIT (OUTPATIENT)
Dept: OBGYN CLINIC | Facility: CLINIC | Age: 33
End: 2023-07-24
Payer: COMMERCIAL

## 2023-07-24 VITALS
SYSTOLIC BLOOD PRESSURE: 122 MMHG | WEIGHT: 216 LBS | BODY MASS INDEX: 39.75 KG/M2 | HEIGHT: 62 IN | DIASTOLIC BLOOD PRESSURE: 74 MMHG

## 2023-07-24 DIAGNOSIS — Z32.02 URINE PREGNANCY TEST NEGATIVE: ICD-10-CM

## 2023-07-24 DIAGNOSIS — R87.810 ATYPICAL SQUAMOUS CELL CHANGES OF UNDETERMINED SIGNIFICANCE (ASCUS) ON CERVICAL CYTOLOGY WITH POSITIVE HIGH RISK HUMAN PAPILLOMA VIRUS (HPV): Primary | ICD-10-CM

## 2023-07-24 DIAGNOSIS — N80.03 ADENOMYOSIS OF UTERUS: ICD-10-CM

## 2023-07-24 DIAGNOSIS — N92.0 MENORRHAGIA WITH REGULAR CYCLE: ICD-10-CM

## 2023-07-24 DIAGNOSIS — R87.610 ATYPICAL SQUAMOUS CELL CHANGES OF UNDETERMINED SIGNIFICANCE (ASCUS) ON CERVICAL CYTOLOGY WITH POSITIVE HIGH RISK HUMAN PAPILLOMA VIRUS (HPV): Primary | ICD-10-CM

## 2023-07-24 LAB — SL AMB POCT URINE HCG: NORMAL

## 2023-07-24 PROCEDURE — 57454 BX/CURETT OF CERVIX W/SCOPE: CPT | Performed by: STUDENT IN AN ORGANIZED HEALTH CARE EDUCATION/TRAINING PROGRAM

## 2023-07-24 PROCEDURE — 99213 OFFICE O/P EST LOW 20 MIN: CPT | Performed by: STUDENT IN AN ORGANIZED HEALTH CARE EDUCATION/TRAINING PROGRAM

## 2023-07-24 PROCEDURE — 81025 URINE PREGNANCY TEST: CPT | Performed by: STUDENT IN AN ORGANIZED HEALTH CARE EDUCATION/TRAINING PROGRAM

## 2023-07-24 RX ORDER — TRANEXAMIC ACID 650 MG/1
1300 TABLET ORAL 3 TIMES DAILY
Qty: 30 TABLET | Refills: 0 | Status: SHIPPED | OUTPATIENT
Start: 2023-07-24 | End: 2023-07-29

## 2023-07-24 NOTE — PROGRESS NOTES
Assessment/Plan:  Deion Welsh is a 28 y.o. [id number] w AUB-A and ASCUS, HPV+ pap who presents for follow-up and colposcopy    1. Atypical squamous cell changes of undetermined significance (ASCUS) on cervical cytology with positive high risk human papilloma virus (HPV)        2. Urine pregnancy test negative  POCT urine HCG      3. Adenomyosis of uterus        4. Menorrhagia with regular cycle  Tranexamic Acid 650 MG TABS         Would like to continue lysteda for now, not interested in LNG-IUD or other methods for now  May desire hysterectomy at some point  colpo performed today    Subjective:      Patient ID: Deion Welsh is a 28 y.o. female. HPI  Took lysteda two tablets BID with shortening of her periods and changed ultra tampons every 3-4 hours instead of every 30-60 minutes  Lasted 4 days of heaviness instead of 7   Next time will take Q8 hours    Starting to think about a hysterectomy  Not sure when woul dbe a good time  Doesn't want LNG-IUD  Would like to wait a year, see what happens with results    The following portions of the patient's history were reviewed and updated as appropriate: allergies, current medications, past medical history, past social history, past surgical history and problem list  Review of Systems  --PER HPI    Objective:  /74 (BP Location: Left arm, Patient Position: Sitting, Cuff Size: Large)   Ht 5' 2" (1.575 m)   Wt 98 kg (216 lb)   LMP 07/12/2023 (Approximate)   BMI 39.51 kg/m²        Physical Exam  Constitutional:       Appearance: Normal appearance. She is not ill-appearing. HENT:      Head: Normocephalic and atraumatic. Eyes:      Extraocular Movements: Extraocular movements intact.       Conjunctiva/sclera: Conjunctivae normal.   Pulmonary:      Effort: Pulmonary effort is normal.   Genitourinary:     Comments: Vulva: normal, no lesions  Vagina: normal, no lesions or ttp  Urethra: normal, no lesions, masses or ttp  Cervix: normal, no lesions, masses or CMT    Musculoskeletal:         General: Normal range of motion. Cervical back: Normal range of motion. Skin:     General: Skin is warm and dry. Neurological:      General: No focal deficit present. Psychiatric:         Mood and Affect: Mood normal.         Behavior: Behavior normal.         Thought Content: Thought content normal.              Colposcopy     Date/Time 7/24/2023 11:00 AM     [place] Protocol   Procedure performed by:  Consent: Verbal consent obtained. Risks and benefits: risks, benefits and alternatives were discussed  Time out: Immediately prior to procedure a "time out" was called to verify the correct patient, procedure, equipment, support staff and site/side marked as required. Required items: required blood products, implants, devices, and special equipment available  Patient identity confirmed: verbally with patient and provided demographic data       Performed by  Paresh Morales MD   Authorized by Paresh Morales MD       Pre-procedure details     Pre-procedure timeout performed: yes       Indication    ASC-US     Procedure Details   Procedure: Colposcopy w/ cervical biopsy and ECC      [place] speculum was placed in the vagina: yes      Under colposcopic examination the transition zone was seen in entirety: no      Endocervix was curetted using a Kevorkian curette: yes      Cervical biopsy performed with a cervical biopsy punch: yes      Monsel's solution was applied: yes      Biopsy(s): yes      Location:  6 o'clock     Post-procedure      Findings: Mosaicism and White epithelium      Impression: Low grade cervical dysplasia      Patient tolerance of procedure:  Aborted     Comments       Reviewed pelvic rest for a few days  Call if any fevers, severe pain, heavy bleeding  If benign or low grade, repeat pap in 1 year  If high grade, will recommend LEEP/CKC  All questions answered to the best of my ability.

## 2023-08-01 LAB
PATH REPORT.SITE OF ORIGIN SPEC: NORMAL
PATH REPORT.SITE OF ORIGIN SPEC: NORMAL
PAYMENT PROCEDURE: NORMAL
PAYMENT PROCEDURE: NORMAL
SL AMB .: NORMAL

## 2023-09-05 ENCOUNTER — OFFICE VISIT (OUTPATIENT)
Dept: PLASTIC SURGERY | Facility: CLINIC | Age: 33
End: 2023-09-05

## 2023-09-05 VITALS
TEMPERATURE: 98.4 F | HEART RATE: 88 BPM | DIASTOLIC BLOOD PRESSURE: 85 MMHG | SYSTOLIC BLOOD PRESSURE: 126 MMHG | HEIGHT: 62 IN | BODY MASS INDEX: 36.8 KG/M2 | WEIGHT: 200 LBS

## 2023-09-05 DIAGNOSIS — Q82.5 CONGENITAL NEVUS OF NECK: ICD-10-CM

## 2023-09-05 DIAGNOSIS — L98.9 CHANGING SKIN LESION: Primary | ICD-10-CM

## 2023-09-15 NOTE — PROGRESS NOTES
[facility]  [address] [place] [address] SARAY Navarrete [place]  [phone]    Patient Identification: Ting Pickett is a 28 y.o. female     History of Present Illness: The patient is a 28y.o.  year-old female  who presents to the office for new patient consult for changing skin lesions. Patient states she has 2 skin lesions, one on her neck and one on her right ear that she is concerned about. Lesion on her neck has "been there her whole life" but is now beginning to grow, changing size and darkening in color. It is asymptomatic. The lesion upon the right ear has also been growing in size, and now bleeds when caught on clothing, etc. Patient denies a personal or family history of skin cancer. She admits to use of tanning beds as a teenager. Patient denies factors contributing to poor wound healing such as diabetes, tobacco use, chronic steroid use, immunotherapy/chemotherapy drug use and blood thinning mediations.       Past Medical History:   Diagnosis Date   • Abnormal weight gain    • Candidiasis, mouth    • Cellulitis of right arm    • Kidney infection    • Pre-employment health screening examination 2018   • SIRS (systemic inflammatory response syndrome) (HCC)    • Tinea corporis         Patient Active Problem List   Diagnosis   • Family history of alpha 1 antitrypsin deficiency   • BMI 37.0-37.9, adult   • Elevated liver enzymes   • Abnormal glucose   • LGSIL on Pap smear of cervix   • Menorrhagia with regular cycle   • Anxiety        Past Surgical History:   Procedure Laterality Date   •  SECTION     • FOOT SURGERY     • SALPINGECTOMY Bilateral         No Known Allergies     Current Outpatient Medications on File Prior to Visit   Medication Sig Dispense Refill   • fluticasone (FLONASE) 50 mcg/act nasal spray 1 spray into each nostril daily (Patient not taking: Reported on 2023)     • Ibuprofen (ADVIL PO) Take by mouth     • propranolol (INDERAL) 10 mg tablet TAKE 1 TABLET BY MOUTH EVERY DAY AS NEEDED FOR ANXIETY (Patient not taking: Reported on 7/24/2023) 90 tablet 1     No current facility-administered medications on file prior to visit. Tobacco Use: Low Risk  (9/5/2023)    Patient History    • Smoking Tobacco Use: Never    • Smokeless Tobacco Use: Never    • Passive Exposure: Not on file        Review of Systems  Constitutional: Denies fevers, chills or pain  Skin: Denies any warmth, erythema, acute edema or mucopurulent drainage. Physical Exam   Vitals:    09/05/23 1637   BP: 126/85   Pulse: 88   Temp: 98.4 °F (36.9 °C)     Constitutional:AAOx3, well-developed, no distress. Pt is cooperative and pleasant. Eyes: Pupils are equal, round, and reactive to light. EOM in tact. Clear conjunctiva  Cardiovascular: Normal rate, regular rhythm, S1 and S2 audible. Pulmonary/Chest: Effort normal and breath sounds normal. No respiratory distress. Neuro: Gait in tact. Reflexes and motor strength normal and symmetric. Cranial nerved 2-12 grossly intact  Psychiatric: Has appropriate behavior and thought process. Extremities: Full ROM, no gross abnormalities. Skin  Right Ear: Lesion measures 4.0x5.0mm, irregular borders, brown, slightly raised. No telangiectasias or ulceration. Neck: Congenital lesion measuring 1.4x1.0cm, various shades of hyperpigmentation. Raised areas throughout. Irregular borders. Assessment and Plan:  The patient is a 28y.o.  year-old female  who presents to the office for new patient consult for changing skin lesions.    -At today's visit reviewed options of future care with the patient including biopsy and excision. Pt would like biopsy of both the ear and neck lesions. Offered as an in-office procedure under local anesthesia. Specimens sent for pathology review. Neck lesion will be replaced with a scar. All the patient's questions were answered.  She would like to proceed with surgical planning.  - Reviewed the risks of the procedure, such as bleeding, infection, asymmetry, contour deformity, scarring, wound healing problems, anesthesia risks, as well as possibility of subsequent procedures. Day of procedure and post-operative course was reviewed with the patient.  -The patient will be contacted by our office to schedule. She will call with any questions. - The patient is to call the office with any questions or concerns. All of the patient's questions were answered at this time and they agree with the plan of care.       Lisandro Atkins PA-C  [facility]astic [facility] Reconstruct[facility] Surgery

## 2023-10-11 ENCOUNTER — TELEPHONE (OUTPATIENT)
Dept: PLASTIC SURGERY | Facility: CLINIC | Age: 33
End: 2023-10-11

## 2023-12-04 ENCOUNTER — OFFICE VISIT (OUTPATIENT)
Dept: FAMILY MEDICINE CLINIC | Facility: CLINIC | Age: 33
End: 2023-12-04
Payer: COMMERCIAL

## 2023-12-04 VITALS
HEIGHT: 62 IN | BODY MASS INDEX: 37.73 KG/M2 | RESPIRATION RATE: 18 BRPM | HEART RATE: 74 BPM | WEIGHT: 205 LBS | SYSTOLIC BLOOD PRESSURE: 132 MMHG | DIASTOLIC BLOOD PRESSURE: 80 MMHG | TEMPERATURE: 97.6 F | OXYGEN SATURATION: 99 %

## 2023-12-04 DIAGNOSIS — H10.31 ACUTE CONJUNCTIVITIS OF RIGHT EYE, UNSPECIFIED ACUTE CONJUNCTIVITIS TYPE: Primary | ICD-10-CM

## 2023-12-04 DIAGNOSIS — J06.9 ACUTE URI: ICD-10-CM

## 2023-12-04 PROCEDURE — 99213 OFFICE O/P EST LOW 20 MIN: CPT | Performed by: NURSE PRACTITIONER

## 2023-12-04 RX ORDER — MOXIFLOXACIN 5 MG/ML
1 SOLUTION/ DROPS OPHTHALMIC 3 TIMES DAILY
Qty: 3 ML | Refills: 0 | Status: SHIPPED | OUTPATIENT
Start: 2023-12-04 | End: 2023-12-11

## 2023-12-04 RX ORDER — AMOXICILLIN 875 MG/1
875 TABLET, COATED ORAL 2 TIMES DAILY
Qty: 20 TABLET | Refills: 0 | Status: SHIPPED | OUTPATIENT
Start: 2023-12-04 | End: 2023-12-14

## 2023-12-04 NOTE — PATIENT INSTRUCTIONS
Conjunctivitis   AMBULATORY CARE:   Conjunctivitis , or pink eye, is inflammation of your conjunctiva. The conjunctiva is a thin tissue that covers the front of your eye and the back of your eyelid. The conjunctiva helps protect your eye and keep it moist. The most common cause of conjunctivitis is infection with bacteria or a virus. Allergies or exposure to a chemical may also cause conjunctivitis. Conjunctivitis is easily spread from person to person. Common signs or symptoms: You may have symptoms in one or both eyes. You may also have other general symptoms, including a sore throat, runny nose, or fever. You may have any of the following:  Redness in the whites of your eye    Itching in or around your eye    Feeling like there is something in your eye    Watery or thick, sticky discharge    Crusty eyelids when you wake up in the morning    Burning, stinging, or swelling in your eye    Pain when you see bright light    Seek care immediately if:   You have worsening eye pain. The swelling in your eye gets worse, even after treatment. Your vision suddenly becomes worse, or you cannot see at all. Call your doctor if:   Your start to notice changes in your vision. You develop a fever and ear pain. You have tiny bumps or spots of blood on your eye. You have questions or concerns about your condition or care. Treatment:  Your conjunctivitis may go away on its own. Treatment depends on what is causing your conjunctivitis. You may need any of the following: Allergy medicine  helps decrease itchy, red, swollen eyes caused by allergies. It may be given as a pill, eye drops, or nasal spray. Antibiotics  may be needed if your conjunctivitis is caused by bacteria. This medicine may be given as a pill, eye drops, or eye ointment. Manage your symptoms:   Apply a cool compress. Wet a washcloth with cold water and place it on your eye. This will help decrease itching and irritation.     Use artificial tears. This will help lessen your symptoms, including itching or irritation. Do not wear contact lenses  until treatment is complete and your symptoms are gone. Flush your eye. You may need to flush your eye with saline to help decrease your symptoms. Ask for more information on how to flush your eye. Prevent the spread of conjunctivitis:   Wash your hands with soap and water often. Wash your hands before and after you touch your eyes. Wash your hands after you use the bathroom, change a child's diaper, or sneeze. Wash your hands before you prepare or eat food. Avoid contact with others. Do not share towels or washcloths. Try to stay away from others as much as possible. Ask when you can return to work or school. Avoid allergens and irritants. Try to avoid the things that cause your allergies, such as pets, dust, or grass. Stay away from smoke filled areas. Shield your eyes from wind and sun. Throw away eye makeup. Bacteria can stay in eye makeup. Throw away your current mascara and other eye makeup. Never share mascara or other eye makeup with anyone. Follow up with your doctor as directed: You may be referred to an ophthalmologist for treatment. Write down your questions so you remember to ask them during your visits. © Copyright Dicie Fruits 2023 Information is for End User's use only and may not be sold, redistributed or otherwise used for commercial purposes. The above information is an  only. It is not intended as medical advice for individual conditions or treatments. Talk to your doctor, nurse or pharmacist before following any medical regimen to see if it is safe and effective for you. Amoxicillin (By mouth)   Amoxicillin ([id number]-m-[id number]-in)  Treats infections or stomach ulcers. This medicine is a penicillin antibiotic. Brand Name(s): Moxatag, Prevpac   There may be other brand names for this medicine. When This Medicine Should Not Be Used:    This medicine is not right for everyone. You should not use it if you had an allergic reaction to amoxicillin, any type of penicillin, or a cephalosporin antibiotic. How to Use This Medicine:   Capsule, Liquid, Tablet, Chewable Tablet, Long Acting Tablet  Your doctor will tell you how much medicine to use. Do not use more than directed. Chewable tablet: You must chew the tablet before you swallow it. You may crush the tablet and mix the medicine with a small amount of food to make it easier to swallow. Oral liquid: Shake well just before each use. Measure the oral liquid medicine with a marked measuring spoon, oral syringe, or medicine cup. You may mix the oral liquid with a baby formula, milk, fruit juice, water, ginger ale, or another cold drink. Be sure your child drinks all of the mixture right away. Tablet for suspension: Place the tablet in a small drinking glass, and add 2 teaspoons of water. Do not use any other liquid. Gently stir or swirl the water in the glass until the tablet is completely dissolved. Drink all of this mixture right away. Add more water to the glass and drink all of it to make sure you get all of the medicine. Do not chew or swallow the tablet for suspension. Take all of the medicine in your prescription to clear up your infection, even if you feel better after the first few doses. Take a dose as soon as you remember. If it is almost time for your next dose, wait until then and take a regular dose. Do not take extra medicine to make up for a missed dose. Store the tablets, capsules, and tablets for suspension at room temperature, away from heat, moisture, and direct light. Store the oral liquid in the refrigerator. Do not freeze. Throw away any unused medicine after 14 days. Drugs and Foods to Avoid:   Ask your doctor or pharmacist before using any other medicine, including over-the-counter medicines, vitamins, and herbal products. Some medicines can affect how amoxicillin works. Tell your doctor if you are also using any of the following:   Allopurinol  Probenecid  Birth control pills  A blood thinner  Warnings While Using This Medicine:   Tell your doctor if you are pregnant or breastfeeding, or if you have kidney disease, allergies, or a condition called phenylketonuria (PKU). Tell your doctor if you are on dialysis. This medicine can cause diarrhea. Call your doctor if the diarrhea becomes severe, does not stop, or is bloody. Do not take any medicine to stop diarrhea until you have talked to your doctor. Diarrhea can occur 2 months or more after you stop taking this medicine. Tell any doctor or dentist who treats you that you are using this medicine. This medicine may affect certain medical test results. Call your doctor if your symptoms do not improve or if they get worse. Use this medicine to treat only the infection your doctor has prescribed it for. Do not use this medicine for any infection or condition that has not been checked by a doctor. This medicine will not treat the flu or the common cold. Keep all medicine out of the reach of children. Never share your medicine with anyone. Possible Side Effects While Using This Medicine:   Call your doctor right away if you notice any of these side effects: Allergic reaction: Itching or hives, swelling in your face or hands, swelling or tingling in your mouth or throat, chest tightness, trouble breathing  Blistering, peeling, or red skin rash  Diarrhea that may contain blood, stomach cramps, fever  If you notice these less serious side effects, talk with your doctor:   Mild diarrhea, nausea, or vomiting  Mild skin rash  If you notice other side effects that you think are caused by this medicine, tell your doctor. Call your doctor for medical advice about side effects.  You may report side effects to FDA at 7-177-FDA-3039  © Copyright Elisha Goltz 2023 Information is for End User's use only and may not be sold, redistributed or otherwise used for commercial purposes. The above information is an  only. It is not intended as medical advice for individual conditions or treatments. Talk to your doctor, nurse or pharmacist before following any medical regimen to see if it is safe and effective for you.

## 2023-12-04 NOTE — LETTER
December 4, 2023     Patient: Ted Abreu  YOB: 1990  Date of Visit: 12/4/2023      To Whom it May Concern:    Lili Tyson is under my professional care. Christy Pérez was seen in my office on 12/4/2023. Christy Pérez may return to work on 12/5/2023 . If you have any questions or concerns, please don't hesitate to call.          Sincerely,          BETTY Doshi        CC: No Recipients

## 2023-12-04 NOTE — PROGRESS NOTES
Assessment/Plan:    1. Acute conjunctivitis of right eye, unspecified acute conjunctivitis type  Comments:  advised on warm compresses at the area  Orders:  -     moxifloxacin (VIGAMOX) 0.5 % ophthalmic solution; Administer 1 drop to both eyes 3 (three) times a day for 7 days    2. Acute URI  -     amoxicillin (AMOXIL) 875 mg tablet; Take 1 tablet (875 mg total) by mouth 2 (two) times a day for 10 days          Patient Instructions: Take medication with food. It is important that you take the entire course of antibiotics prescribed. May also take a probiotic of your choice to maintain healthy GI artem. Can take some probiotic and yogurt with the medication. Supportive care discussed and advised. Advised to RTO for any worsening and no improvement. Follow up for no improvement and worsening of conditions. Patient advised and educated when to see immediate medical care. Return if symptoms worsen or fail to improve. No future appointments. Subjective:      Patient ID: Kell Santoyo is a 35 y.o. female. Chief Complaint   Patient presents with   • Conjunctivitis   • Sore Throat   • Earache     Started Saturday  just returned from [address] [place] [address]:  /80   Pulse 74   Temp 97.6 °F (36.4 °C)   Resp 18   Ht 5' 2" (1.575 m)   Wt 93 kg (205 lb)   LMP 11/22/2023 (Approximate)   SpO2 99%   BMI 37.49 kg/m²     Patient stated that started with sore throat, and ear pain couple of days ago and then progressed to right eye redness and stated that crusted shut in morning. Denies fever, chills and sob    Conjunctivitis   Associated symptoms include ear pain, sore throat, eye discharge and eye redness. Pertinent negatives include no fever, no eye itching, no photophobia, no abdominal pain, no constipation, no diarrhea, no nausea, no vomiting, no congestion, no ear discharge, no headaches, no hearing loss, no mouth sores, no rhinorrhea, no cough, no wheezing and no eye pain. Sore Throat   Associated symptoms include ear pain. Pertinent negatives include no abdominal pain, congestion, coughing, diarrhea, drooling, ear discharge, headaches, shortness of breath, trouble swallowing or vomiting. Earache   Associated symptoms include a sore throat. Pertinent negatives include no abdominal pain, coughing, diarrhea, ear discharge, headaches, hearing loss, rhinorrhea or vomiting. PHQ-2/9 Depression Screening    Little interest or pleasure in doing things: 0 - not at all  Feeling down, depressed, or hopeless: 0 - not at all  PHQ-2 Score: 0  PHQ-2 Interpretation: Negative depression screen             The following portions of the patient's history were reviewed and updated as appropriate: allergies, current medications, past family history, past medical history, past social history, past surgical history and problem list.      Review of Systems   Constitutional:  Negative for chills, diaphoresis, fatigue, fever and unexpected weight change. HENT:  Positive for ear pain and sore throat. Negative for congestion, dental problem, drooling, ear discharge, facial swelling, hearing loss, mouth sores, nosebleeds, postnasal drip, rhinorrhea, sinus pressure, sinus pain, sneezing, tinnitus, trouble swallowing and voice change. Eyes:  Positive for discharge and redness. Negative for photophobia, pain, itching and visual disturbance. Respiratory:  Negative for cough, chest tightness, shortness of breath and wheezing. Cardiovascular: Negative. Gastrointestinal:  Negative for abdominal pain, constipation, diarrhea, nausea and vomiting. Musculoskeletal: Negative. Skin: Negative. Neurological:  Negative for dizziness, light-headedness and headaches.          Objective:    Social History     Tobacco Use   Smoking Status Never   Smokeless Tobacco Never       Allergies: No Known Allergies      Current Outpatient Medications   Medication Sig Dispense Refill   • amoxicillin (AMOXIL) 875 mg tablet Take 1 tablet (875 mg total) by mouth 2 (two) times a day for 10 days 20 tablet 0   • Ibuprofen (ADVIL PO) Take by mouth     • moxifloxacin (VIGAMOX) 0.5 % ophthalmic solution Administer 1 drop to both eyes 3 (three) times a day for 7 days 3 mL 0   • fluticasone (FLONASE) 50 mcg/act nasal spray 1 spray into each nostril daily (Patient not taking: Reported on 6/26/2023)     • propranolol (INDERAL) 10 mg tablet TAKE 1 TABLET BY MOUTH EVERY DAY AS NEEDED FOR ANXIETY (Patient not taking: Reported on 7/24/2023) 90 tablet 1     No current facility-administered medications for this visit. Physical Exam  Vitals reviewed. Constitutional:       Appearance: Normal appearance. She is well-developed. HENT:      Head: Normocephalic. Right Ear: Tympanic membrane, ear canal and external ear normal.      Left Ear: Tympanic membrane, ear canal and external ear normal.      Nose: Mucosal edema present. Right Sinus: No maxillary sinus tenderness or frontal sinus tenderness. Left Sinus: No maxillary sinus tenderness or frontal sinus tenderness. Mouth/Throat:      Mouth: No oral lesions. Pharynx: Posterior oropharyngeal erythema present. No oropharyngeal exudate. Eyes:      General: Lids are normal. Vision grossly intact. Conjunctiva/sclera:      Right eye: Right conjunctiva is injected. Left eye: Left conjunctiva is not injected. Cardiovascular:      Rate and Rhythm: Normal rate and regular rhythm. Heart sounds: Normal heart sounds. Pulmonary:      Effort: Pulmonary effort is normal.      Breath sounds: Normal breath sounds. Musculoskeletal:         General: Normal range of motion. Cervical back: Neck supple. Lymphadenopathy:      Cervical:      Right cervical: No superficial or posterior cervical adenopathy. Left cervical: No superficial or posterior cervical adenopathy. Skin:     General: Skin is warm and dry.    Neurological:      Mental Status: She is alert and oriented to person, place, and time. Psychiatric:         Behavior: Behavior normal.         Thought Content:  Thought content normal.         Judgment: Judgment normal.                     Jenni BETTY Escoto

## 2024-01-30 DIAGNOSIS — N92.0 MENORRHAGIA WITH REGULAR CYCLE: ICD-10-CM

## 2024-01-30 DIAGNOSIS — E78.5 DYSLIPIDEMIA: ICD-10-CM

## 2024-01-30 DIAGNOSIS — Z79.899 ENCOUNTER FOR LONG-TERM CURRENT USE OF MEDICATION: ICD-10-CM

## 2024-01-30 DIAGNOSIS — R53.83 FATIGUE, UNSPECIFIED TYPE: ICD-10-CM

## 2024-01-30 DIAGNOSIS — R74.8 ELEVATED LIVER ENZYMES: Primary | ICD-10-CM

## 2024-02-02 LAB
25(OH)D3+25(OH)D2 SERPL-MCNC: 28.3 NG/ML (ref 30–100)
ALBUMIN SERPL-MCNC: 4.4 G/DL (ref 3.9–4.9)
ALBUMIN/GLOB SERPL: 1.5 {RATIO} (ref 1.2–2.2)
ALP SERPL-CCNC: 49 IU/L (ref 44–121)
ALT SERPL-CCNC: 19 IU/L (ref 0–32)
AST SERPL-CCNC: 20 IU/L (ref 0–40)
B BURGDOR AB SER IA.MTTT-IMP: ABNORMAL
B BURGDOR IGG SERPL QL IA: POSITIVE
B BURGDOR IGG+IGM SER QL IA: NORMAL
B BURGDOR IGM SERPL QL IA: NEGATIVE
BILIRUB SERPL-MCNC: <0.2 MG/DL (ref 0–1.2)
BUN SERPL-MCNC: 17 MG/DL (ref 6–20)
BUN/CREAT SERPL: 25 (ref 9–23)
CALCIUM SERPL-MCNC: 9.7 MG/DL (ref 8.7–10.2)
CHLORIDE SERPL-SCNC: 106 MMOL/L (ref 96–106)
CHOLEST SERPL-MCNC: 221 MG/DL (ref 100–199)
CO2 SERPL-SCNC: 22 MMOL/L (ref 20–29)
CREAT SERPL-MCNC: 0.68 MG/DL (ref 0.57–1)
EGFR: 118 ML/MIN/1.73
ERYTHROCYTE [DISTWIDTH] IN BLOOD BY AUTOMATED COUNT: 12.8 % (ref 11.7–15.4)
GLOBULIN SER-MCNC: 2.9 G/DL (ref 1.5–4.5)
GLUCOSE SERPL-MCNC: 93 MG/DL (ref 70–99)
HCT VFR BLD AUTO: 41.9 % (ref 34–46.6)
HDLC SERPL-MCNC: 57 MG/DL
HGB BLD-MCNC: 13.8 G/DL (ref 11.1–15.9)
LDLC SERPL CALC-MCNC: 148 MG/DL (ref 0–99)
LDLC/HDLC SERPL: 2.6 RATIO (ref 0–3.2)
MCH RBC QN AUTO: 29.4 PG (ref 26.6–33)
MCHC RBC AUTO-ENTMCNC: 32.9 G/DL (ref 31.5–35.7)
MCV RBC AUTO: 89 FL (ref 79–97)
MICRODELETION SYND BLD/T FISH: NORMAL
PLATELET # BLD AUTO: 267 X10E3/UL (ref 150–450)
POTASSIUM SERPL-SCNC: 4.7 MMOL/L (ref 3.5–5.2)
PROT SERPL-MCNC: 7.3 G/DL (ref 6–8.5)
RBC # BLD AUTO: 4.69 X10E6/UL (ref 3.77–5.28)
SL AMB VLDL CHOLESTEROL CALC: 16 MG/DL (ref 5–40)
SODIUM SERPL-SCNC: 141 MMOL/L (ref 134–144)
TRIGL SERPL-MCNC: 93 MG/DL (ref 0–149)
TSH SERPL DL<=0.005 MIU/L-ACNC: 1.98 UIU/ML (ref 0.45–4.5)
VIT B12 SERPL-MCNC: 855 PG/ML (ref 232–1245)
WBC # BLD AUTO: 10 X10E3/UL (ref 3.4–10.8)

## 2024-02-06 ENCOUNTER — OFFICE VISIT (OUTPATIENT)
Dept: FAMILY MEDICINE CLINIC | Facility: CLINIC | Age: 34
End: 2024-02-06
Payer: COMMERCIAL

## 2024-02-06 VITALS
DIASTOLIC BLOOD PRESSURE: 72 MMHG | TEMPERATURE: 98.9 F | SYSTOLIC BLOOD PRESSURE: 124 MMHG | RESPIRATION RATE: 18 BRPM | BODY MASS INDEX: 38.46 KG/M2 | HEART RATE: 81 BPM | WEIGHT: 209 LBS | HEIGHT: 62 IN

## 2024-02-06 DIAGNOSIS — Z11.59 NEED FOR HEPATITIS C SCREENING TEST: ICD-10-CM

## 2024-02-06 DIAGNOSIS — E55.9 VITAMIN D DEFICIENCY: ICD-10-CM

## 2024-02-06 DIAGNOSIS — L65.9 HAIR LOSS: ICD-10-CM

## 2024-02-06 DIAGNOSIS — M25.50 ARTHRALGIA, UNSPECIFIED JOINT: ICD-10-CM

## 2024-02-06 DIAGNOSIS — R53.83 OTHER FATIGUE: ICD-10-CM

## 2024-02-06 DIAGNOSIS — R94.6 ABNORMAL THYROID FUNCTION TEST: ICD-10-CM

## 2024-02-06 DIAGNOSIS — A69.20 LYME DISEASE: Primary | ICD-10-CM

## 2024-02-06 PROCEDURE — 99214 OFFICE O/P EST MOD 30 MIN: CPT | Performed by: FAMILY MEDICINE

## 2024-02-06 RX ORDER — MULTIVIT-MIN/IRON/FOLIC ACID/K 18-600-40
1 CAPSULE ORAL DAILY
Start: 2024-02-06

## 2024-02-06 RX ORDER — DOXYCYCLINE HYCLATE 100 MG
100 TABLET ORAL 2 TIMES DAILY
Qty: 42 TABLET | Refills: 0 | Status: SHIPPED | OUTPATIENT
Start: 2024-02-06 | End: 2024-02-27

## 2024-02-06 NOTE — PROGRESS NOTES
Name: Lena Coates      : 1990      MRN: [id number]  Encounter Provider: Leigh Baker DO  Encounter Date: 2024   Encounter department: [facility]    Assessment & Plan     1. Lyme disease  -     doxycycline hyclate (VIBRA-TABS) 100 mg tablet; Take 1 tablet (100 mg total) by mouth 2 (two) times a day for 21 days    2. Other fatigue    3. Arthralgia, unspecified joint  -     Sjogren's Antibodies; Future; Expected date: 2024  -     C-reactive protein; Future; Expected date: 2024  -     RIN Comprehensive Panel; Future  -     Rheumatoid Arthritis Factor; Future  -     Sjogren's Antibodies  -     C-reactive protein  -     RIN Comprehensive Panel  -     Rheumatoid Arthritis Factor    4. Hair loss  -     Ferritin; Future  -     Ferritin    5. Abnormal thyroid function test  -     T4, free; Future  -     T3; Future  -     T4, free  -     T3    6. Need for hepatitis C screening test  -     Hepatitis C Antibody; Future  -     Hepatitis C Antibody    7. Vitamin D deficiency  -     Cholecalciferol (Vitamin D) 50 MCG (2000 UT) CAPS; Take 1 capsule (2,000 Units total) by mouth in the morning        Assessment & Plan  1. Lyme disease.  Her thyroid is normal. . I will treat her with doxycycline twice a day for 3 weeks. She was cautioned against sun exposure while on doxycycline.     2. Fatigue and hair loss.  I will obtain a ferritin level.    3. Low vitamin D.  She will start vitamin D 2000 units a day.    Follow-up  The patient will follow up in 2024 for a physical.     Orders Placed This Encounter   Procedures    Ferritin    Sjogren's Antibodies    C-reactive protein    T4, free    T3    RIN Comprehensive Panel    Rheumatoid Arthritis Factor    Hepatitis C Antibody       Subjective      History of Present Illness  The patient presents for evaluation of multiple medical concerns.    In 10/2023, she started getting a weird rash on both parts of both hands. She thought it was eczema.  "She went to the doctor on 01/01/2024 because her hand was full of pus because everything ripped open. It had white yellow crust all around it. It is still not better. She had a fungal yeast infection in between her hands. She was given a fungal medication and Bactroban. She thinks it looked like Lyme's disease. [place] hockey fields from 09[place]2023 to 10/2023. She is wondering if she got bitten. Her fatigue started around the first week of 12/2023. It got really bad about 3 weeks ago. Her hair is falling out. She is irritable randomly. Her skin is awful. Her mind is fine. Some days she feels like she is in a fog. She weighed 198 pounds on 12/15/2023. She gained 12 pounds, but now she has lost 3 pounds. She has not changed her eating habits. She was anemic with both her pregnancies. Her cholesterol is crazy. Her cholesterol was perfect in 04/2023. She is taking CoQ10, fish oil, and probiotic. She does not have any travel plans in the next couple of weeks.    Her vitamin D is very low. She is not on vitamin D. She is taking calcium 500 mg.    Her fatigue started right after Thanksgiving. She went to [employer] and was fine. She has been taking 500 mg of calcium.    Her skin is awful.   She has a family history of thyroid issues.   Review of Systems      Objective     /72   Pulse 81   Temp 98.9 °F (37.2 °C)   Resp 18   Ht 5' 2\" (1.575 m)   Wt 94.8 kg (209 lb)   LMP 01/11/2024 (Exact Date)   BMI 38.23 kg/m²     Physical Exam  Thyroid pain feels normal.  Physical Exam  Vitals and nursing note reviewed.   Constitutional:       Appearance: She is well-developed.   HENT:      Head: Normocephalic and atraumatic.      Right Ear: Tympanic membrane and external ear normal.      Left Ear: Tympanic membrane and external ear normal.   Cardiovascular:      Rate and Rhythm: Normal rate and regular rhythm.      Heart sounds: Normal heart sounds. No murmur heard.     No friction rub.   Pulmonary:      Effort: Pulmonary " effort is normal. No respiratory distress.      Breath sounds: Normal breath sounds. No wheezing or rales.   Musculoskeletal:      Right lower leg: No edema.      Left lower leg: No edema.   Skin:     Findings: Rash (right hand, see picture) present.

## 2024-02-09 LAB
CENTROMERE B AB SER-ACNC: <0.2 AI (ref 0–0.9)
CHROMATIN AB SERPL-ACNC: <0.2 AI (ref 0–0.9)
CRP SERPL-MCNC: 1 MG/L (ref 0–10)
DSDNA AB SER-ACNC: <1 IU/ML (ref 0–9)
ENA JO1 AB SER-ACNC: <0.2 AI (ref 0–0.9)
ENA RNP AB SER-ACNC: <0.2 AI (ref 0–0.9)
ENA SCL70 AB SER-ACNC: <0.2 AI (ref 0–0.9)
ENA SM AB SER-ACNC: <0.2 AI (ref 0–0.9)
ENA SS-A AB SER-ACNC: <0.2 AI (ref 0–0.9)
ENA SS-B AB SER-ACNC: <0.2 AI (ref 0–0.9)
FERRITIN SERPL-MCNC: 21 NG/ML (ref 15–150)
HCV AB S/CO SERPL IA: NON REACTIVE
RHEUMATOID FACT SERPL-ACNC: <10 IU/ML
SL AMB SEE BELOW:: NORMAL
T3 SERPL-MCNC: 105 NG/DL (ref 71–180)
T4 FREE SERPL-MCNC: 1.06 NG/DL (ref 0.82–1.77)

## 2024-04-05 ENCOUNTER — RA CDI HCC (OUTPATIENT)
Dept: OTHER | Facility: HOSPITAL | Age: 34
End: 2024-04-05

## 2024-04-05 DIAGNOSIS — E78.5 DYSLIPIDEMIA: ICD-10-CM

## 2024-04-05 DIAGNOSIS — R74.8 ELEVATED LIVER ENZYMES: ICD-10-CM

## 2024-04-05 DIAGNOSIS — R73.09 ABNORMAL GLUCOSE: Primary | ICD-10-CM

## 2024-04-05 DIAGNOSIS — N92.0 MENORRHAGIA WITH REGULAR CYCLE: ICD-10-CM

## 2024-04-05 NOTE — PROGRESS NOTES
HCC coding opportunities       Chart reviewed, no opportunity found: CHART REVIEWED, NO OPPORTUNITY FOUND        Patients Insurance        Commercial Insurance: [employer]

## 2024-04-07 LAB
ALBUMIN SERPL-MCNC: 4.1 G/DL (ref 3.9–4.9)
ALBUMIN/GLOB SERPL: 1.5 {RATIO} (ref 1.2–2.2)
ALP SERPL-CCNC: 48 IU/L (ref 44–121)
ALT SERPL-CCNC: 20 IU/L (ref 0–32)
AST SERPL-CCNC: 17 IU/L (ref 0–40)
BILIRUB SERPL-MCNC: <0.2 MG/DL (ref 0–1.2)
BUN SERPL-MCNC: 10 MG/DL (ref 6–20)
BUN/CREAT SERPL: 12 (ref 9–23)
CALCIUM SERPL-MCNC: 9.4 MG/DL (ref 8.7–10.2)
CHLORIDE SERPL-SCNC: 102 MMOL/L (ref 96–106)
CHOLEST SERPL-MCNC: 203 MG/DL (ref 100–199)
CO2 SERPL-SCNC: 24 MMOL/L (ref 20–29)
CREAT SERPL-MCNC: 0.83 MG/DL (ref 0.57–1)
EGFR: 95 ML/MIN/1.73
ERYTHROCYTE [DISTWIDTH] IN BLOOD BY AUTOMATED COUNT: 12.7 % (ref 11.7–15.4)
EST. AVERAGE GLUCOSE BLD GHB EST-MCNC: 108 MG/DL
GLOBULIN SER-MCNC: 2.8 G/DL (ref 1.5–4.5)
GLUCOSE SERPL-MCNC: 93 MG/DL (ref 70–99)
HBA1C MFR BLD: 5.4 % (ref 4.8–5.6)
HCT VFR BLD AUTO: 41.6 % (ref 34–46.6)
HDLC SERPL-MCNC: 45 MG/DL
HGB BLD-MCNC: 14 G/DL (ref 11.1–15.9)
LDLC SERPL CALC-MCNC: 134 MG/DL (ref 0–99)
LDLC/HDLC SERPL: 3 RATIO (ref 0–3.2)
MCH RBC QN AUTO: 30.1 PG (ref 26.6–33)
MCHC RBC AUTO-ENTMCNC: 33.7 G/DL (ref 31.5–35.7)
MCV RBC AUTO: 90 FL (ref 79–97)
MICRODELETION SYND BLD/T FISH: NORMAL
PLATELET # BLD AUTO: 330 X10E3/UL (ref 150–450)
POTASSIUM SERPL-SCNC: 4.3 MMOL/L (ref 3.5–5.2)
PROT SERPL-MCNC: 6.9 G/DL (ref 6–8.5)
RBC # BLD AUTO: 4.65 X10E6/UL (ref 3.77–5.28)
SL AMB VLDL CHOLESTEROL CALC: 24 MG/DL (ref 5–40)
SODIUM SERPL-SCNC: 138 MMOL/L (ref 134–144)
TRIGL SERPL-MCNC: 135 MG/DL (ref 0–149)
WBC # BLD AUTO: 6.2 X10E3/UL (ref 3.4–10.8)

## 2024-04-12 ENCOUNTER — OFFICE VISIT (OUTPATIENT)
Dept: FAMILY MEDICINE CLINIC | Facility: CLINIC | Age: 34
End: 2024-04-12
Payer: COMMERCIAL

## 2024-04-12 VITALS
BODY MASS INDEX: 37.94 KG/M2 | SYSTOLIC BLOOD PRESSURE: 124 MMHG | RESPIRATION RATE: 16 BRPM | WEIGHT: 214.13 LBS | TEMPERATURE: 98.3 F | HEIGHT: 63 IN | HEART RATE: 91 BPM | DIASTOLIC BLOOD PRESSURE: 70 MMHG

## 2024-04-12 DIAGNOSIS — Z00.00 ANNUAL PHYSICAL EXAM: Primary | ICD-10-CM

## 2024-04-12 DIAGNOSIS — R73.09 ABNORMAL GLUCOSE: ICD-10-CM

## 2024-04-12 DIAGNOSIS — E66.09 CLASS 2 OBESITY DUE TO EXCESS CALORIES WITHOUT SERIOUS COMORBIDITY WITH BODY MASS INDEX (BMI) OF 37.0 TO 37.9 IN ADULT: ICD-10-CM

## 2024-04-12 PROBLEM — E66.812 CLASS 2 OBESITY DUE TO EXCESS CALORIES WITHOUT SERIOUS COMORBIDITY WITH BODY MASS INDEX (BMI) OF 37.0 TO 37.9 IN ADULT: Status: ACTIVE | Noted: 2024-04-12

## 2024-04-12 PROCEDURE — 99395 PREV VISIT EST AGE 18-39: CPT | Performed by: FAMILY MEDICINE

## 2024-04-12 RX ORDER — PHENTERMINE HYDROCHLORIDE 37.5 MG/1
37.5 TABLET ORAL
Qty: 30 TABLET | Refills: 2 | Status: SHIPPED | OUTPATIENT
Start: 2024-04-12

## 2024-04-12 NOTE — PROGRESS NOTES
ADULT ANNUAL PHYSICAL  [facility]    NAME: Lena Coates  AGE: 33 y.o. SEX: female  : 1990     DATE: 2024     Assessment and Plan:     Problem List Items Addressed This Visit     Abnormal glucose     Better with diet         Class 2 obesity due to excess calories without serious comorbidity with body mass index (BMI) of 37.0 to 37.9 in adult     Will treat with phentermine for 12 weeks to help kick start her metabolism  She will continue her healthy diet and exercise program and continue to work with her          Relevant Medications    phentermine (ADIPEX-P) 37.5 MG tablet   Other Visit Diagnoses     Annual physical exam    -  Primary          [place] prescription monitoring [place] report reviewed and is appropriate.    Risks and benefits of medication discussed.      Immunizations and preventive care screenings were discussed with patient today. Appropriate education was printed on patient's after visit summary.    Counseling:  Dental Health: discussed importance of regular tooth brushing, flossing, and dental visits.  Exercise: the importance of regular exercise/physical activity was discussed. Recommend exercise 3-5 times per week for at least 30 minutes.          Return in about 1 year (around 2025) for Annual physical.     Chief Complaint:     Chief Complaint   Patient presents with   • Annual Exam     Lw cma      History of Present Illness:     Adult Annual Physical   Patient here for a comprehensive physical exam. The patient reports  weight gain .    Diet and Physical Activity  Diet/Nutrition: well balanced diet.   Exercise: moderate cardiovascular exercise and strength training exercises.      Depression Screening  PHQ-2/9 Depression Screening    Little interest or pleasure in doing things: 0 - not at all  Feeling down, depressed, or hopeless: 0 - not at all  PHQ-2 Score: 0  PHQ-2 Interpretation: Negative depression screen        General Health  Sleep: sleeps well.   Hearing: normal - bilateral.  Vision: wears glasses.   Dental: regular dental visits.       /GYN Health  Follows with gynecology? yes     Contraceptive method:  tubal ligation .       Review of Systems:     Review of Systems   Past Medical History:     Past Medical History:   Diagnosis Date   • Abnormal weight gain    • Candidiasis, mouth    • Cellulitis of right arm    • Kidney infection    • Pre-employment health screening examination 2018   • SIRS (systemic inflammatory response syndrome) (HCC)    • Tinea corporis       Past Surgical History:     Past Surgical History:   Procedure Laterality Date   •  SECTION     • FOOT SURGERY     • SALPINGECTOMY Bilateral       Social History:     Social History     Socioeconomic History   • Marital status: /Civil Union     Spouse name: None   • Number of children: None   • Years of education: None   • Highest education level: None   Occupational History   • None   Tobacco Use   • Smoking status: Never     Passive exposure: Never   • Smokeless tobacco: Never   Vaping Use   • Vaping status: Never Used   Substance and Sexual Activity   • Alcohol use: Yes     Comment: social   • Drug use: No   • Sexual activity: Yes     Partners: Male     Birth control/protection: Female Sterilization   Other Topics Concern   • None   Social History Narrative   • None     Social Determinants of Health     Financial Resource Strain: Not on file   Food Insecurity: Not on file   Transportation Needs: Not on file   Physical Activity: Not on file   Stress: Not on file   Social Connections: Not on file   Intimate Partner Violence: Not on file   Housing Stability: Not on file      Family History:     Family History   Problem Relation Age of Onset   • Diabetes Mother    • No Known Problems Father    • Diabetes Maternal Grandmother    • Cancer Maternal Grandfather    • Cancer Paternal Grandfather    • Other Half-Sister         ITP   •  "Depression Half-Sister    • Learning disabilities Half-Brother    • Auditory processing disorder Half-Brother       Current Medications:     Current Outpatient Medications   Medication Sig Dispense Refill   • Cholecalciferol (Vitamin D) 50 MCG (2000 UT) CAPS Take 1 capsule (2,000 Units total) by mouth in the morning     • phentermine (ADIPEX-P) 37.5 MG tablet Take 1 tablet (37.5 mg total) by mouth daily before breakfast 30 tablet 2     No current facility-administered medications for this visit.      Allergies:     No Known Allergies   Physical Exam:     /70   Pulse 91   Temp 98.3 °F (36.8 °C) (Tympanic)   Resp 16   Ht 5' 3\" (1.6 m)   Wt 97.1 kg (214 lb 2 oz)   LMP 04/02/2024 (Exact Date)   BMI 37.93 kg/m²     Physical Exam  Vitals and nursing note reviewed.   Constitutional:       Appearance: She is well-developed.   HENT:      Head: Normocephalic and atraumatic.      Right Ear: External ear normal.      Left Ear: External ear normal.      Nose: Nose normal.   Cardiovascular:      Rate and Rhythm: Normal rate and regular rhythm.      Heart sounds: Normal heart sounds. No murmur heard.     No friction rub.   Pulmonary:      Effort: No respiratory distress.      Breath sounds: Normal breath sounds. No wheezing or rales.   Abdominal:      Palpations: Abdomen is soft.      Tenderness: There is no abdominal tenderness.   Musculoskeletal:      Right lower leg: No edema.      Left lower leg: No edema.   Neurological:      Mental Status: She is oriented to person, place, and time.      Cranial Nerves: No cranial nerve deficit.        Vision Screening    Right eye Left eye Both eyes   Without correction      With correction 20/15 20/15 20/15       Leigh Baker [facility]    "

## 2024-04-12 NOTE — ASSESSMENT & PLAN NOTE
Will treat with phentermine for 12 weeks to help kick start her metabolism  She will continue her healthy diet and exercise program and continue to work with her

## 2024-04-19 ENCOUNTER — TELEPHONE (OUTPATIENT)
Age: 34
End: 2024-04-19

## 2024-04-19 NOTE — TELEPHONE ENCOUNTER
PA for Phentermine (ADIPEX-P) 37.5 MG tablet Approved     Date(s) approved 3- - 7-        Patient advised by [x] Prompt.ly Message                      [] Phone call       Pharmacy advised by [x]Fax                                     []Phone call    Approval letter scanned into Media No approval letter not available at this time.

## 2024-04-19 NOTE — TELEPHONE ENCOUNTER
PA for Phentermine (ADIPEX-P) 37.5 MG tablet    Submitted via    []CMM-KEY   [x][employer]-Case ID # [id number]  []Faxed to plan   []Other website   []Phone call Case ID #     Office notes sent, clinical questions answered. Awaiting determination    Turnaround time for your insurance to make a decision on your Prior Authorization can take 7-21 business days.

## 2024-05-13 ENCOUNTER — PATIENT MESSAGE (OUTPATIENT)
Dept: FAMILY MEDICINE CLINIC | Facility: CLINIC | Age: 34
End: 2024-05-13

## 2024-05-14 DIAGNOSIS — A69.20 LYME DISEASE: Primary | ICD-10-CM

## 2024-05-14 RX ORDER — AMOXICILLIN 500 MG/1
500 CAPSULE ORAL EVERY 8 HOURS SCHEDULED
Qty: 42 CAPSULE | Refills: 0 | Status: SHIPPED | OUTPATIENT
Start: 2024-05-14 | End: 2024-05-28

## 2024-07-12 ENCOUNTER — ANNUAL EXAM (OUTPATIENT)
Dept: OBGYN CLINIC | Facility: CLINIC | Age: 34
End: 2024-07-12
Payer: COMMERCIAL

## 2024-07-12 VITALS
SYSTOLIC BLOOD PRESSURE: 122 MMHG | HEIGHT: 62 IN | BODY MASS INDEX: 38.46 KG/M2 | WEIGHT: 209 LBS | DIASTOLIC BLOOD PRESSURE: 80 MMHG

## 2024-07-12 DIAGNOSIS — R87.810 ASCUS WITH POSITIVE HIGH RISK HPV CERVICAL: ICD-10-CM

## 2024-07-12 DIAGNOSIS — Z01.419 WOMEN'S ANNUAL ROUTINE GYNECOLOGICAL EXAMINATION: Primary | ICD-10-CM

## 2024-07-12 DIAGNOSIS — N92.0 MENORRHAGIA WITH REGULAR CYCLE: ICD-10-CM

## 2024-07-12 DIAGNOSIS — R87.610 ASCUS WITH POSITIVE HIGH RISK HPV CERVICAL: ICD-10-CM

## 2024-07-12 PROCEDURE — 99395 PREV VISIT EST AGE 18-39: CPT | Performed by: STUDENT IN AN ORGANIZED HEALTH CARE EDUCATION/TRAINING PROGRAM

## 2024-07-12 RX ORDER — TRANEXAMIC ACID 650 MG/1
1300 TABLET ORAL 3 TIMES DAILY PRN
Qty: 30 TABLET | Refills: 0 | Status: SHIPPED | OUTPATIENT
Start: 2024-07-12 | End: 2024-07-17

## 2024-07-12 NOTE — PROGRESS NOTES
[facility]   Annual Well Woman Exam  Peter    ASSESSMENT & PLAN: Lena Coates is a 33 y.o.  with normal gynecologic exam.    1.  Routine well woman exam done today.  2.  Pap and HPV:Pap with HPV was done today.  Current ASCCP Guidelines reviewed.   3.  Lena is low risk and does not need or desire STI testing  4.  Lena is sexually active. She is using sterilization for contraception, not interested in hormonal management, using TXA, interested in planning for hysterectomy. RTO for endometrial biopsy.  5.  The following were reviewed in today's visit: breast self exam and exercise.  6.  Return to office for endobx +/- colpo, also preop hyst. Will be on phentermine for 2 more weeks, discussed stopping before hyst.    All questions answered to the best of my ability.      Subjective:    CC:  Annual Gynecologic Examination    HPI: Lena Coates is a 33 y.o.  who presents for annual gynecologic examination.        GYN  Issues with abnormal pap and colposcopy  --NILM, HPV+  --LSIL  : ASCUS, HPV+--> colposcopy benign    Completed HPV vaccines    Taking tranexamic acid for heavy menorrhagia, but only using 3 days when bleeding is heaviest, taking two tablet every 8 hours    Lots of cramps living with Advil  Doesn't want to do more hormonal methods  Doesn't want to try IUD, concerned about the risks  Thinks might be ready for hysterectomy    OB  2xCS  S/p BS    G/U  Denies hematuria and dysuria    Breast:  had breast biopsy  Distant relatives w/ cervical, colon cancer, breast and ovarian  No first degree relatives  Mom was tested for hereditary forms of cancer--negative  Patient does do regular self-exams    Health Maintenance:    She does follow with a PCP.  She feels safe at home and denies domestic violence.  Working on improving--fruits and vegetables, high fiber, probiotics, MVI  Exercising 7x/week    She does not use tobacco    Past Medical History:   Diagnosis Date     Abnormal weight gain     Candidiasis, mouth     Cellulitis of right arm     Kidney infection     Pre-employment health screening examination 2018    SIRS (systemic inflammatory response syndrome) (HCC)     Tinea corporis        Past Surgical History:   Procedure Laterality Date     SECTION      FOOT SURGERY      SALPINGECTOMY Bilateral        Past OB/Gyn History:     Patient's last menstrual period was 2024 (approximate).    Family History:  Family History   Problem Relation Age of Onset    Diabetes Mother     No Known Problems Father     Diabetes Maternal Grandmother     Cancer Maternal Grandfather     Cancer Paternal Grandfather     Other Half-Sister         ITP    Depression Half-Sister     Learning disabilities Half-Brother     Auditory processing disorder Half-Brother        Social History:  Social History     Socioeconomic History    Marital status: /Civil Union     Spouse name: Not on file    Number of children: Not on file    Years of education: Not on file    Highest education level: Not on file   Occupational History    Not on file   Tobacco Use    Smoking status: Never     Passive exposure: Never    Smokeless tobacco: Never   Vaping Use    Vaping status: Never Used   Substance and Sexual Activity    Alcohol use: Yes     Comment: social    Drug use: No    Sexual activity: Yes     Partners: Male     Birth control/protection: Female Sterilization   Other Topics Concern    Not on file   Social History Narrative    Not on file     Social Determinants of Health     Financial Resource Strain: Not on file   Food Insecurity: Not on file   Transportation Needs: Not on file   Physical Activity: Not on file   Stress: Not on file   Social Connections: Not on file   Intimate Partner Violence: Not on file   Housing Stability: Not on file     Presently lives with two kids, .  Patient is .  Patient is currently employed     No Known Allergies    Current Outpatient Medications:      "Cholecalciferol (Vitamin D) 50 MCG (2000 UT) CAPS, Take 1 capsule (2,000 Units total) by mouth in the morning, Disp: , Rfl:     phentermine (ADIPEX-P) 37.5 MG tablet, Take 1 tablet (37.5 mg total) by mouth daily before breakfast, Disp: 30 tablet, Rfl: 2    Tranexamic Acid 650 MG TABS, Take 2 tablets (1,300 mg total) by mouth 3 (three) times a day as needed (heavy bleeding) for up to 5 days, Disp: 30 tablet, Rfl: 0    Review of Systems:  Denies chest pain, shortness of breath, abdominal pain, nausea, vomiting, vaginal bleeding, vaginal discharge. All other systems negative unless otherwise stated.     Physical Exam:  /80 (BP Location: Right arm, Patient Position: Sitting, Cuff Size: Large)   Ht 5' 2\" (1.575 m)   Wt 94.8 kg (209 lb)   LMP 05/13/2024 (Approximate)   BMI 38.23 kg/m²  Body mass index is 38.23 kg/m².   GEN: The patient was alert and oriented x3, pleasant well-appearing female in no acute distress.   HEENT:  Unremarkable, no anterior or posterior lymphadenopathy, no thyromegaly  CV:  Regular rate and rhythm, normal S1 and S2, no murmurs  RESP:  Clear to auscultation bilaterally, no wheezes, rales or rhonchi  BREAST:  Symmetric breasts with no palpable breast masses or obvious breast lesions. She has no retractions or nipple discharge. She has no axillary abnormalities or palpable masses.   GI:  Soft, nontender, non-distended  MSK: bilateral lower extremities are nontender, no edema  : Normal appearing external female genitalia, normal appearing urethral meatus. On sterile speculum exam, normal appearing vaginal epithelium, no vaginal discharge, no bleeding, grossly normal appearing cervix. On bimanual exam, no cervical motion tenderness; uterus is smooth, mobile, non-tender, normal weeks size. No tenderness or fullness in the bilateral adnexa.   Pap collected    "

## 2024-07-16 LAB
CYTOLOGIST CVX/VAG CYTO: NORMAL
DX ICD CODE: NORMAL
HPV GENOTYPE REFLEX: NORMAL
HPV I/H RISK 4 DNA CVX QL PROBE+SIG AMP: NEGATIVE
Lab: NORMAL
OTHER STN SPEC: NORMAL
PATH REPORT.FINAL DX SPEC: NORMAL
SL AMB NOTE:: NORMAL
SL AMB SPECIMEN ADEQUACY: NORMAL
SL AMB TEST METHODOLOGY: NORMAL

## 2024-08-23 ENCOUNTER — PROCEDURE VISIT (OUTPATIENT)
Dept: OBGYN CLINIC | Facility: CLINIC | Age: 34
End: 2024-08-23
Payer: COMMERCIAL

## 2024-08-23 VITALS
DIASTOLIC BLOOD PRESSURE: 86 MMHG | HEIGHT: 62 IN | BODY MASS INDEX: 38.83 KG/M2 | SYSTOLIC BLOOD PRESSURE: 122 MMHG | WEIGHT: 211 LBS

## 2024-08-23 DIAGNOSIS — N93.9 ABNORMAL UTERINE BLEEDING: Primary | ICD-10-CM

## 2024-08-23 DIAGNOSIS — Z32.02 NEGATIVE PREGNANCY TEST: ICD-10-CM

## 2024-08-23 LAB — SL AMB POCT URINE HCG: NEGATIVE

## 2024-08-23 PROCEDURE — 58100 BIOPSY OF UTERUS LINING: CPT | Performed by: STUDENT IN AN ORGANIZED HEALTH CARE EDUCATION/TRAINING PROGRAM

## 2024-08-23 PROCEDURE — 81025 URINE PREGNANCY TEST: CPT | Performed by: STUDENT IN AN ORGANIZED HEALTH CARE EDUCATION/TRAINING PROGRAM

## 2024-08-23 RX ORDER — DOXYCYCLINE HYCLATE 100 MG
100 TABLET ORAL 2 TIMES DAILY
COMMUNITY
Start: 2024-07-25

## 2024-08-23 NOTE — PROGRESS NOTES
"Endometrial biopsy    Date/Time: 8/23/2024 4:00 PM    Performed by: Keyla Peter MD  Authorized by: Keyla Peter MD  Universal Protocol:  Procedure performed by:  Consent: Verbal consent obtained.  Risks and benefits: risks, benefits and alternatives were discussed  Consent given by: patient  Time out: Immediately prior to procedure a \"time out\" was called to verify the correct patient, procedure, equipment, support staff and site/side marked as required.  Patient understanding: patient states understanding of the procedure being performed  Required items: required blood products, implants, devices, and special equipment available  Patient identity confirmed: verbally with patient and provided demographic data    Indication:     Indications: Other disorder of menstruation and other abnormal bleeding from female genital tract    Procedure:     Procedure: endometrial biopsy with Pipelle      A bivalve speculum was placed in the vagina: yes      Cervix cleaned and prepped: yes      An intracervical block was performed: yes      Local anesthetic:  Lidocaine without epinephrine    The cervix was dilated: no      Uterus sounded: yes      Uterus sound depth (cm):  7    Curettes used:  1    Specimen collected: specimen collected and sent to pathology      Patient tolerated procedure well with no complications: yes    Findings:     Uterus size:  Non-gravid    Cervix: normal      Adnexa: normal        "

## 2024-08-31 LAB
PATH REPORT.SITE OF ORIGIN SPEC: NORMAL
PAYMENT PROCEDURE: NORMAL
SL AMB .: NORMAL

## 2024-09-03 ENCOUNTER — TELEPHONE (OUTPATIENT)
Age: 34
End: 2024-09-03

## 2024-09-03 NOTE — TELEPHONE ENCOUNTER
Patient called and has a pre op appointment with Dr Olivares on 9/12 and she had a couple of questions and also was trying to find out if we had any dates for her surgery.  Please call her back at [phone] . Thank you

## 2024-09-09 NOTE — TELEPHONE ENCOUNTER
Need to wait until pt has preop appt with Dr. Peter. Once I get all that I need from that appt pt will get a call.

## 2024-09-12 ENCOUNTER — CONSULT (OUTPATIENT)
Dept: OBGYN CLINIC | Facility: CLINIC | Age: 34
End: 2024-09-12
Payer: COMMERCIAL

## 2024-09-12 VITALS
BODY MASS INDEX: 38.64 KG/M2 | SYSTOLIC BLOOD PRESSURE: 118 MMHG | WEIGHT: 210 LBS | HEIGHT: 62 IN | DIASTOLIC BLOOD PRESSURE: 66 MMHG

## 2024-09-12 DIAGNOSIS — N92.1 MENORRHAGIA WITH IRREGULAR CYCLE: ICD-10-CM

## 2024-09-12 DIAGNOSIS — N93.9 ABNORMAL UTERINE BLEEDING: Primary | ICD-10-CM

## 2024-09-12 DIAGNOSIS — Z01.818 PREOPERATIVE EXAM FOR GYNECOLOGIC SURGERY: ICD-10-CM

## 2024-09-12 PROCEDURE — 99214 OFFICE O/P EST MOD 30 MIN: CPT | Performed by: STUDENT IN AN ORGANIZED HEALTH CARE EDUCATION/TRAINING PROGRAM

## 2024-09-12 NOTE — PROGRESS NOTES
Ambulatory Visit  Name: Lena Coates      : 1990      MRN: [id number]  Encounter Provider: Keyla Peter MD  Encounter Date: 2024   Encounter department: [facility] [place]    Assessment & Plan  Abnormal uterine bleeding         Menorrhagia with irregular cycle         Preoperative exam for gynecologic surgery         Extensively reviewed risks and benefits of ongoing medical management, endometrial ablation, and hysterectomy. Patient opts for definitive surgical management with a total laparoscopic hysterectomy and bilateral salpingectomy.    Reviewed procedure in detail and risks including but not limited to VTE/stroke, bleeding that may require transfusion, infection, injury to surrounding structures including bowel or bladder that may require additional procedures or surgeries. Reviewed possibility of open incision if any unanticipated difficulty or complications.  Surgical consent reviewed and signed today.  Provided surgical booklet, wash, carb drinks with instructions .  Reviewed need to fast after midnight (or at least 8 hours) prior to surgery.  Reviewed need for ride after surgery given under anesthesia for surgery.        History of Present Illness     Lena Coates is a 33 y.o. female who presents for preop consultation    Pap 2023: NILM, HPV neg  Endobx 24: Diagnosis:   ENDOMETRIUM, BIOPSY:   PROLIFERATIVE ENDOMETRIAL FRAGMENTS.   NEGATIVE FOR HYPERPLASIA, ATYPIA AND MALIGNANCY.     2xCSs  History of salpingectomy    Excellent functional status  Can walk a block and flight of stairs without shortness of breath      Review of Systems   HENT:  Negative for trouble swallowing.    Respiratory:  Negative for shortness of breath.    Cardiovascular:  Negative for chest pain and palpitations.   Genitourinary:  Positive for menstrual problem and vaginal bleeding.        ++dysmenorrhea   Musculoskeletal:  Negative for gait problem.     Pertinent  "Medical History   Past Medical History:   Diagnosis Date    Abnormal weight gain     Candidiasis, mouth     Cellulitis of right arm     Kidney infection     Pre-employment health screening examination 2018    SIRS (systemic inflammatory response syndrome) (HCC)     Tinea corporis      Past Surgical History:  No date:  SECTION  No date: FOOT SURGERY  No date: SALPINGECTOMY; Bilateral      Objective     /66 (BP Location: Left arm, Patient Position: Sitting)   Ht 5' 2\" (1.575 m)   Wt 95.3 kg (210 lb)   LMP 2024 (Exact Date)   BMI 38.41 kg/m²     Physical Exam  Constitutional:       Appearance: Normal appearance.   HENT:      Head: Normocephalic.      Nose: Nose normal.   Eyes:      Extraocular Movements: Extraocular movements intact.      Conjunctiva/sclera: Conjunctivae normal.   Cardiovascular:      Rate and Rhythm: Normal rate and regular rhythm.      Heart sounds: Normal heart sounds.   Pulmonary:      Effort: Pulmonary effort is normal.      Breath sounds: Normal breath sounds.   Abdominal:      General: There is no distension.      Palpations: Abdomen is soft.      Tenderness: There is no abdominal tenderness. There is no guarding.   Musculoskeletal:         General: Normal range of motion.      Cervical back: Normal range of motion.   Neurological:      General: No focal deficit present.      Mental Status: She is alert.   Psychiatric:         Mood and Affect: Mood normal.         Behavior: Behavior normal.         Thought Content: Thought content normal.         Study Result    Narrative & Impression   PELVIC ULTRASOUND, COMPLETE     INDICATION:  The patient is 32 years old.  N92.1: Excessive and frequent menstruation with irregular cycle.     COMPARISON: None     TECHNIQUE:   Transabdominal pelvic ultrasound was performed in sagittal and transverse planes with a curvilinear transducer.  Additional transvaginal imaging was performed to better evaluate the endometrium and ovaries.  " Imaging included volumetric   sweeps as well as traditional still imaging technique.     FINDINGS:     UTERUS:  The uterus is anteverted in position, measuring 9.7 x 4.9 x 5.2 cm.  The uterus has a normal contour with a mildly heterogeneous echotexture.  The cervix appears within normal limits.     ENDOMETRIUM:  The endometrial echo complex has an AP caliber of 13.0 mm.  Its appearance is within normal limits for age and cycle and shows no filling defects.     OVARIES/ADNEXA:  Right ovary:  3.0 x 2.1 x 2.2 cm. 7.3 mL. Dominant follicle noted.  Left ovary:  2.8 x 2.7 x 1.2 cm. 4.8 mL.  Ovarian Doppler flow is within normal limits.  No suspicious ovarian or adnexal abnormality.     OTHER:  No free fluid or loculated fluid collections.        IMPRESSION:     Findings suggesting adenomyosis.

## 2024-09-25 ENCOUNTER — TELEPHONE (OUTPATIENT)
Age: 34
End: 2024-09-25

## 2024-09-25 NOTE — TELEPHONE ENCOUNTER
Lena called in because she final has set a date for hysterectomy for the end of the year 12/18. She would like to know if Dr Baker can clear her for surgery. She would be getting her blood work done three weeks before surgery date. Please Advise Thank you

## 2024-09-25 NOTE — TELEPHONE ENCOUNTER
Called patient and explained she would need to be seen for her clearance between 11/18-12/17 for her clearance. She understood this. There are no 30 minute slots to schedule her and she would only like to see Dr. Baker. Is is okay to schedule patient in a 15 min slot for a preop?  Toyin Lopez, CMA

## 2024-09-26 ENCOUNTER — TELEPHONE (OUTPATIENT)
Dept: OBGYN CLINIC | Facility: CLINIC | Age: 34
End: 2024-09-26

## 2024-09-26 NOTE — TELEPHONE ENCOUNTER
----- Message from Keyla Peter MD sent at 2024  1:22 PM EDT -----  [facility] Department  Surgery Scheduling Sheet    Patient Name: Lena Coates  : 1990    Provider: Keyla Peter MD     Needed: no; Language: N/A    Procedure: exam under anesthesia, total laparoscopic hysterectomy, bilateral salpingectomy (of possible tubal remnants) and cystoscopy    Diagnosis: AUB    Special Needs or Equipment: none    Anesthesia: General anesthesia    Length of stay: outpatient  Does patient have comorbid conditions that will require close perioperative monitoring prior to safe discharge: no    The patient has comorbid conditions that will require close perioperative monitoring prior to safe discharge, including N/A.   This may require acute care beyond the usual and routine recovery period. As such, inpatient admission post-operatively is expected and appropriate, and anticipated hospital length of stay will be >2 midnights.    Pre-Admission Testing Needed: yes   Labs that should be ordered: cbc, hgb A1C, type and screen, BMP, and urine pregnancy test    Order PAT that is recommended in prep for procedure?: Yes    Medical Clearance Needed: yes; Provider: PCP    MA Form Signed (tubals/hysterectomy): Not Indicated    Surgical Drink Given: yes     How many days out of work: 2 week(s)     How many days no drivin week(s)       Is pre op appt needed?  no  Interval for post op appt: 2 week(s) and 6 weeks    For Surgical Scheduler:     Surgery Scheduled On:  [place]: [facility], [facility], [facility], and [facility]     Pre-op Appt: 24   Post op Appt:  Consult/Medical clearance appt:

## 2024-10-30 ENCOUNTER — OFFICE VISIT (OUTPATIENT)
Dept: OBGYN CLINIC | Facility: CLINIC | Age: 34
End: 2024-10-30
Payer: COMMERCIAL

## 2024-10-30 VITALS
DIASTOLIC BLOOD PRESSURE: 72 MMHG | HEIGHT: 62 IN | WEIGHT: 221 LBS | BODY MASS INDEX: 40.67 KG/M2 | SYSTOLIC BLOOD PRESSURE: 120 MMHG

## 2024-10-30 DIAGNOSIS — Z80.3 FAMILY HISTORY OF BREAST CANCER: ICD-10-CM

## 2024-10-30 DIAGNOSIS — N63.11 BREAST LUMP ON RIGHT SIDE AT 10 O'CLOCK POSITION: ICD-10-CM

## 2024-10-30 DIAGNOSIS — Z87.898 HISTORY OF LUMP OF RIGHT BREAST: ICD-10-CM

## 2024-10-30 DIAGNOSIS — N64.4 BREAST PAIN IN FEMALE: Primary | ICD-10-CM

## 2024-10-30 PROCEDURE — 99213 OFFICE O/P EST LOW 20 MIN: CPT | Performed by: STUDENT IN AN ORGANIZED HEALTH CARE EDUCATION/TRAINING PROGRAM

## 2024-10-30 NOTE — PROGRESS NOTES
"Ambulatory Visit  Name: Lena Coates      : 1990      MRN: [id number]  Encounter Provider: Keyla Peter MD  Encounter Date: 10/30/2024   Encounter department: [facility] [place]    Assessment & Plan  Breast pain in female  Reviewed NSAIDS, better fitting bra, decreased caffeine intake (already decreased), primrose oil  Given focal ttp and hx of right breast lump, will pursue diagnostic imaging    Orders:    Mammo diagnostic bilateral w 3d and cad; Future    US breast left limited (diagnostic); Future    US breast right limited (diagnostic); Future    History of lump of right breast    Orders:    US breast right limited (diagnostic); Future    Breast lump on right side at 10 o'clock position    Orders:    US breast right limited (diagnostic); Future    Family history of breast cancer  Offered referral to cancer genetics, will consider and call if desires referral         History of Present Illness     Lena Coates is a 34 y.o. female who presents breast tenderness    Bilateral breast tenderness, pins and needles, very sensitive  Feels very different than prior bouts of mastitis  No new lumps, still feels previously biopsied lump in right breast at 10 oclock, potentially changed in size, difficult to tell for sure, very tender  Had tried change in breast support  Has tries cutting back on caffeine  Doesn't seem cyclic  Nothing truly seems to improve      Paternal aunt had breast cancer  Paternal great aunt had ovarian cancer  No uterine cancer  Great grandmother had colon cancer  Great aunt on maternal side had breast cancer  Her mother had genetic testing which was normal      Review of Systems--per HPI        Objective     /72 (BP Location: Left arm, Patient Position: Sitting)   Ht 5' 2\" (1.575 m)   Wt 100 kg (221 lb)   LMP 10/28/2024 (Exact Date)   BMI 40.42 kg/m²     Physical Exam  Constitutional:       Appearance: Normal appearance.   HENT:      " Head: Normocephalic.   Eyes:      Extraocular Movements: Extraocular movements intact.      Conjunctiva/sclera: Conjunctivae normal.   Pulmonary:      Effort: Pulmonary effort is normal.   Chest:   Breasts:     Right: Mass and tenderness present. No bleeding, inverted nipple or nipple discharge.      Left: Tenderness present. No bleeding, inverted nipple, mass, nipple discharge or skin change.          Comments: Ttp in left breast in inner lower quadrant  Mobile lump approx 1.5x1.5cm at 10 o'clock in right breast  Musculoskeletal:         General: Normal range of motion.      Cervical back: Normal range of motion.   Skin:     General: Skin is warm and dry.   Neurological:      General: No focal deficit present.      Mental Status: She is alert.   Psychiatric:         Mood and Affect: Mood normal.         Behavior: Behavior normal.         Thought Content: Thought content normal.

## 2024-11-26 ENCOUNTER — RA CDI HCC (OUTPATIENT)
Dept: OTHER | Facility: HOSPITAL | Age: 34
End: 2024-11-26

## 2024-11-26 NOTE — PROGRESS NOTES
HCC coding opportunities       Chart reviewed, no opportunity found: CHART REVIEWED, NO OPPORTUNITY FOUND        Patients Insurance        Commercial Insurance: [employer] Insurance

## 2024-11-29 ENCOUNTER — APPOINTMENT (OUTPATIENT)
Dept: LAB | Facility: HOSPITAL | Age: 34
End: 2024-11-29
Attending: STUDENT IN AN ORGANIZED HEALTH CARE EDUCATION/TRAINING PROGRAM
Payer: COMMERCIAL

## 2024-11-29 DIAGNOSIS — N93.9 ABNORMAL UTERINE BLEEDING: ICD-10-CM

## 2024-11-29 LAB
ANION GAP SERPL CALCULATED.3IONS-SCNC: 3 MMOL/L (ref 4–13)
BUN SERPL-MCNC: 12 MG/DL (ref 5–25)
CALCIUM SERPL-MCNC: 8.7 MG/DL (ref 8.4–10.2)
CHLORIDE SERPL-SCNC: 103 MMOL/L (ref 96–108)
CO2 SERPL-SCNC: 30 MMOL/L (ref 21–32)
CREAT SERPL-MCNC: 0.74 MG/DL (ref 0.6–1.3)
EST. AVERAGE GLUCOSE BLD GHB EST-MCNC: 114 MG/DL
GFR SERPL CREATININE-BSD FRML MDRD: 106 ML/MIN/1.73SQ M
GLUCOSE SERPL-MCNC: 93 MG/DL (ref 65–140)
HBA1C MFR BLD: 5.6 %
POTASSIUM SERPL-SCNC: 4.6 MMOL/L (ref 3.5–5.3)
SODIUM SERPL-SCNC: 136 MMOL/L (ref 135–147)

## 2024-11-29 PROCEDURE — 36415 COLL VENOUS BLD VENIPUNCTURE: CPT

## 2024-11-29 PROCEDURE — 80048 BASIC METABOLIC PNL TOTAL CA: CPT

## 2024-11-29 PROCEDURE — 83036 HEMOGLOBIN GLYCOSYLATED A1C: CPT

## 2024-12-02 ENCOUNTER — RESULTS FOLLOW-UP (OUTPATIENT)
Dept: OBGYN CLINIC | Facility: CLINIC | Age: 34
End: 2024-12-02

## 2024-12-03 ENCOUNTER — OFFICE VISIT (OUTPATIENT)
Dept: FAMILY MEDICINE CLINIC | Facility: CLINIC | Age: 34
End: 2024-12-03
Payer: COMMERCIAL

## 2024-12-03 VITALS
TEMPERATURE: 97.6 F | SYSTOLIC BLOOD PRESSURE: 120 MMHG | WEIGHT: 227 LBS | BODY MASS INDEX: 41.77 KG/M2 | RESPIRATION RATE: 18 BRPM | HEIGHT: 62 IN | HEART RATE: 81 BPM | DIASTOLIC BLOOD PRESSURE: 70 MMHG | OXYGEN SATURATION: 98 %

## 2024-12-03 DIAGNOSIS — N93.9 ABNORMAL UTERINE BLEEDING (AUB): Primary | ICD-10-CM

## 2024-12-03 DIAGNOSIS — L21.0 SEBORRHEA CAPITIS IN ADULT: ICD-10-CM

## 2024-12-03 DIAGNOSIS — R73.09 ABNORMAL GLUCOSE: ICD-10-CM

## 2024-12-03 DIAGNOSIS — Z01.818 PREOPERATIVE EXAMINATION: ICD-10-CM

## 2024-12-03 PROCEDURE — 99214 OFFICE O/P EST MOD 30 MIN: CPT | Performed by: FAMILY MEDICINE

## 2024-12-03 RX ORDER — CLOBETASOL PROPIONATE 0.5 MG/ML
SOLUTION TOPICAL 2 TIMES DAILY
Qty: 25 ML | Refills: 0 | Status: SHIPPED | OUTPATIENT
Start: 2024-12-03

## 2024-12-03 NOTE — PROGRESS NOTES
Pre-operative Clearance  Name: Lena Coates      : 1990      MRN: [id number]  Encounter Provider: Leigh Baker DO  Encounter Date: 12/3/2024   Encounter department: [facility]    Assessment & Plan  Abnormal uterine bleeding (AUB)  Agree with proceeding with hysterectomy       Preoperative examination  Reviewed Hemoglobin A1c and BMP from 24  Reviewed pap smear from 24        Abnormal glucose  Hemoglobin A1c is normal        Seborrhea capitis in adult  new  Orders:    clobetasol (TEMOVATE) 0.05 % external solution; Apply topically 2 (two) times a day    Pre-operative Clearance:     Revised Cardiac Risk Index:  RCI RISK CLASS IV (>2 risk factors, risk of major cardiac complications approximately 9.1%)    Clearance:  Patient is medically optimized (CLEARED) for proposed surgery without any additional cardiac testing.      Medication Instructions:   - Avoid herbs or non-directed vitamins one week prior to surgery      Follow up for her CPE as scheduled    History of Present Illness     She is getting ready for her hysterectomy.  She has been having problems with abnormal bleeding for quite some time.  She is anxious to get the surgery done.        Review of Systems   Constitutional: Negative.    Respiratory: Negative.     Cardiovascular: Negative.      Past Medical History   Past Medical History:   Diagnosis Date    Abnormal weight gain     Candidiasis, mouth     Cellulitis of right arm     Kidney infection     Lyme disease     Pre-employment health screening examination 2018    SIRS (systemic inflammatory response syndrome) (HCC)     Tinea corporis      Past Surgical History:   Procedure Laterality Date     SECTION      FOOT SURGERY      SALPINGECTOMY Bilateral      Family History   Problem Relation Age of Onset    Diabetes Mother     No Known Problems Father     Diabetes Maternal Grandmother     Cancer Maternal Grandfather     Cancer Paternal Grandfather     Other  "Half-Sister         ITP    Depression Half-Sister     Learning disabilities Half-Brother     Auditory processing disorder Half-Brother      Social History     Tobacco Use    Smoking status: Never     Passive exposure: Never    Smokeless tobacco: Never   Vaping Use    Vaping status: Never Used   Substance and Sexual Activity    Alcohol use: Yes     Comment: social    Drug use: No    Sexual activity: Yes     Partners: Male     Birth control/protection: Female Sterilization     Current Outpatient Medications on File Prior to Visit   Medication Sig    Cholecalciferol (Vitamin D) 50 MCG (2000 UT) CAPS Take 1 capsule (2,000 Units total) by mouth in the morning (Patient not taking: Reported on 12/3/2024)     No Known Allergies  Objective   /70   Pulse 81   Temp 97.6 °F (36.4 °C)   Resp 18   Ht 5' 2\" (1.575 m)   Wt 103 kg (227 lb)   LMP 11/20/2024 (Approximate)   SpO2 98%   BMI 41.52 kg/m²     Physical Exam  Vitals and nursing note reviewed.   Constitutional:       Appearance: She is well-developed.   HENT:      Head: Normocephalic and atraumatic.      Right Ear: External ear normal.      Left Ear: External ear normal.      Nose: Nose normal.   Cardiovascular:      Rate and Rhythm: Normal rate and regular rhythm.      Heart sounds: Normal heart sounds. No murmur heard.     No friction rub.   Pulmonary:      Effort: No respiratory distress.      Breath sounds: Normal breath sounds. No wheezing or rales.   Skin:     Findings: Rash (erythematous scaly on posterior scalp) present.           Leigh Baker, DO  "

## 2024-12-05 ENCOUNTER — ANESTHESIA EVENT (OUTPATIENT)
Dept: PERIOP | Facility: HOSPITAL | Age: 34
End: 2024-12-05
Payer: COMMERCIAL

## 2024-12-11 ENCOUNTER — RESULTS FOLLOW-UP (OUTPATIENT)
Dept: OBGYN CLINIC | Facility: CLINIC | Age: 34
End: 2024-12-11

## 2024-12-11 ENCOUNTER — HOSPITAL ENCOUNTER (OUTPATIENT)
Dept: RADIOLOGY | Facility: HOSPITAL | Age: 34
Discharge: HOME | End: 2024-12-11
Attending: STUDENT IN AN ORGANIZED HEALTH CARE EDUCATION/TRAINING PROGRAM
Payer: COMMERCIAL

## 2024-12-11 VITALS — BODY MASS INDEX: 41.41 KG/M2 | HEIGHT: 62 IN | WEIGHT: 225 LBS

## 2024-12-11 DIAGNOSIS — N63.11 BREAST LUMP ON RIGHT SIDE AT 10 O'CLOCK POSITION: ICD-10-CM

## 2024-12-11 DIAGNOSIS — N64.4 BREAST PAIN IN FEMALE: ICD-10-CM

## 2024-12-11 DIAGNOSIS — Z87.898 HISTORY OF LUMP OF RIGHT BREAST: ICD-10-CM

## 2024-12-11 PROCEDURE — 76642 ULTRASOUND BREAST LIMITED: CPT

## 2024-12-11 PROCEDURE — 77066 DX MAMMO INCL CAD BI: CPT

## 2024-12-11 PROCEDURE — G0279 TOMOSYNTHESIS, MAMMO: HCPCS

## 2024-12-11 NOTE — PRE-PROCEDURE INSTRUCTIONS
Pre-Surgery Instructions:   Medication Instructions    clobetasol (TEMOVATE) 0.05 % external solution Instructions provided by MD    Medication instructions for day surgery reviewed. Please use only a sip of water to take your instructed medications. Avoid all over the counter vitamins, supplements and NSAIDS for one week prior to surgery per anesthesia guidelines. Tylenol is ok to take as needed.     You will receive a call one business day prior to surgery with an arrival time and hospital directions. If your surgery is scheduled on a Monday, the hospital will be calling you on the Friday prior to your surgery. If you have not heard from anyone by 8pm, please call the hospital supervisor through the hospital  at [phone]. ([place] [phone] or [place] [phone]).    Do not eat or drink anything after midnight the night before your surgery, including candy, mints, lifesavers, or chewing gum. Do not drink alcohol 24hrs before your surgery. Try not to smoke at least 24hrs before your surgery.       Follow the pre surgery showering instructions as listed in the “My Surgical Experience Booklet” or otherwise provided by your surgeon's office. Do not use a blade to shave the surgical area 1 week before surgery. It is okay to use a clean electric clippers up to 24 hours before surgery. Do not apply any lotions, creams, including makeup, cologne, deodorant, or perfumes after showering on the day of your surgery. Do not use dry shampoo, hair spray, hair gel, or any type of hair products.     No contact lenses, eye make-up, or artificial eyelashes. Remove nail polish, including gel polish, and any artificial, gel, or acrylic nails if possible. Remove all jewelry including rings and body piercing jewelry.     Wear causal clothing that is easy to take on and off. Consider your type of surgery.    Keep any valuables, jewelry, piercings at home. Please bring any specially ordered equipment (sling, braces) if  indicated.    Arrange for a responsible person to drive you to and from the hospital on the day of your surgery. Please confirm the visitor policy for the day of your procedure when you receive your phone call with an arrival time.     Call the surgeon's office with any new illnesses, exposures, or additional questions prior to surgery.    Please reference your “My Surgical Experience Booklet” for additional information to prepare for your upcoming surgery.

## 2024-12-17 PROBLEM — E66.9 OBESE: Status: ACTIVE | Noted: 2024-04-12

## 2024-12-17 NOTE — ANESTHESIA PREPROCEDURE EVALUATION
Procedure:  HYSTERECTOMY LAPAROSCOPIC TOTAL (LTH) WITH BILATERAL SALPINGECTOMY (OF REMNANTS) EXAM UNDER ANESTHESIA (Bilateral: Abdomen)  CYSTOSCOPY (Bladder)    Relevant Problems   NEURO/PSYCH   (+) Anxiety      Other   (+) Obese        Physical Exam    Airway    Mallampati score: II  TM Distance: >3 FB  Neck ROM: full     Dental       Cardiovascular  Rhythm: regular, Rate: normal    Pulmonary   Breath sounds clear to auscultation    Other Findings  post-pubertal.      Anesthesia Plan  ASA Score- 3     Anesthesia Type- general with ASA Monitors.         Additional Monitors:     Airway Plan: ETT.           Plan Factors-    Chart reviewed.        Patient is not a current smoker.              Induction- intravenous.    Postoperative Plan- Plan for postoperative opioid use.     Perioperative Resuscitation Plan - Level 1 - Full Code.       Informed Consent- Anesthetic plan and risks discussed with patient.  I personally reviewed this patient with the CRNA. Discussed and agreed on the Anesthesia Plan with the CRNA..

## 2024-12-18 ENCOUNTER — HOSPITAL ENCOUNTER (OUTPATIENT)
Facility: HOSPITAL | Age: 34
Setting detail: OUTPATIENT SURGERY
Discharge: HOME | End: 2024-12-18
Attending: STUDENT IN AN ORGANIZED HEALTH CARE EDUCATION/TRAINING PROGRAM | Admitting: STUDENT IN AN ORGANIZED HEALTH CARE EDUCATION/TRAINING PROGRAM
Payer: COMMERCIAL

## 2024-12-18 ENCOUNTER — ANESTHESIA (OUTPATIENT)
Dept: PERIOP | Facility: HOSPITAL | Age: 34
End: 2024-12-18
Payer: COMMERCIAL

## 2024-12-18 VITALS
HEIGHT: 62 IN | OXYGEN SATURATION: 99 % | RESPIRATION RATE: 18 BRPM | HEART RATE: 70 BPM | WEIGHT: 228.84 LBS | TEMPERATURE: 98 F | SYSTOLIC BLOOD PRESSURE: 121 MMHG | BODY MASS INDEX: 42.11 KG/M2 | DIASTOLIC BLOOD PRESSURE: 70 MMHG

## 2024-12-18 DIAGNOSIS — Z90.710 STATUS POST LAPAROSCOPIC HYSTERECTOMY: Primary | ICD-10-CM

## 2024-12-18 DIAGNOSIS — N93.9 ABNORMAL UTERINE BLEEDING: ICD-10-CM

## 2024-12-18 LAB
ABO GROUP BLD: NORMAL
BLD GP AB SCN SERPL QL: NEGATIVE
EXT PREGNANCY TEST URINE: NEGATIVE
EXT. CONTROL: NORMAL
RH BLD: NEGATIVE
SPECIMEN EXPIRATION DATE: NORMAL

## 2024-12-18 PROCEDURE — 86901 BLOOD TYPING SEROLOGIC RH(D): CPT | Performed by: STUDENT IN AN ORGANIZED HEALTH CARE EDUCATION/TRAINING PROGRAM

## 2024-12-18 PROCEDURE — 88307 TISSUE EXAM BY PATHOLOGIST: CPT | Performed by: STUDENT IN AN ORGANIZED HEALTH CARE EDUCATION/TRAINING PROGRAM

## 2024-12-18 PROCEDURE — 81025 URINE PREGNANCY TEST: CPT | Performed by: STUDENT IN AN ORGANIZED HEALTH CARE EDUCATION/TRAINING PROGRAM

## 2024-12-18 PROCEDURE — 86900 BLOOD TYPING SEROLOGIC ABO: CPT | Performed by: STUDENT IN AN ORGANIZED HEALTH CARE EDUCATION/TRAINING PROGRAM

## 2024-12-18 PROCEDURE — NC001 PR NO CHARGE: Performed by: STUDENT IN AN ORGANIZED HEALTH CARE EDUCATION/TRAINING PROGRAM

## 2024-12-18 PROCEDURE — 58571 TLH W/T/O 250 G OR LESS: CPT | Performed by: OBSTETRICS & GYNECOLOGY

## 2024-12-18 PROCEDURE — 88305 TISSUE EXAM BY PATHOLOGIST: CPT | Performed by: STUDENT IN AN ORGANIZED HEALTH CARE EDUCATION/TRAINING PROGRAM

## 2024-12-18 PROCEDURE — 58571 TLH W/T/O 250 G OR LESS: CPT | Performed by: STUDENT IN AN ORGANIZED HEALTH CARE EDUCATION/TRAINING PROGRAM

## 2024-12-18 PROCEDURE — 86850 RBC ANTIBODY SCREEN: CPT | Performed by: STUDENT IN AN ORGANIZED HEALTH CARE EDUCATION/TRAINING PROGRAM

## 2024-12-18 RX ORDER — FENTANYL CITRATE 50 UG/ML
INJECTION, SOLUTION INTRAMUSCULAR; INTRAVENOUS AS NEEDED
Status: DISCONTINUED | OUTPATIENT
Start: 2024-12-18 | End: 2024-12-18

## 2024-12-18 RX ORDER — LIDOCAINE HYDROCHLORIDE 10 MG/ML
INJECTION, SOLUTION EPIDURAL; INFILTRATION; INTRACAUDAL; PERINEURAL AS NEEDED
Status: DISCONTINUED | OUTPATIENT
Start: 2024-12-18 | End: 2024-12-18

## 2024-12-18 RX ORDER — GABAPENTIN 100 MG/1
100 CAPSULE ORAL ONCE
Status: COMPLETED | OUTPATIENT
Start: 2024-12-18 | End: 2024-12-18

## 2024-12-18 RX ORDER — ONDANSETRON 2 MG/ML
INJECTION INTRAMUSCULAR; INTRAVENOUS AS NEEDED
Status: DISCONTINUED | OUTPATIENT
Start: 2024-12-18 | End: 2024-12-18

## 2024-12-18 RX ORDER — PROPOFOL 10 MG/ML
INJECTION, EMULSION INTRAVENOUS AS NEEDED
Status: DISCONTINUED | OUTPATIENT
Start: 2024-12-18 | End: 2024-12-18

## 2024-12-18 RX ORDER — DEXAMETHASONE SODIUM PHOSPHATE 10 MG/ML
INJECTION, SOLUTION INTRAMUSCULAR; INTRAVENOUS AS NEEDED
Status: DISCONTINUED | OUTPATIENT
Start: 2024-12-18 | End: 2024-12-18

## 2024-12-18 RX ORDER — SODIUM CHLORIDE, SODIUM LACTATE, POTASSIUM CHLORIDE, CALCIUM CHLORIDE 600; 310; 30; 20 MG/100ML; MG/100ML; MG/100ML; MG/100ML
75 INJECTION, SOLUTION INTRAVENOUS CONTINUOUS
Status: DISCONTINUED | OUTPATIENT
Start: 2024-12-18 | End: 2024-12-19 | Stop reason: HOSPADM

## 2024-12-18 RX ORDER — CEFAZOLIN SODIUM 2 G/50ML
2000 SOLUTION INTRAVENOUS ONCE
Status: COMPLETED | OUTPATIENT
Start: 2024-12-18 | End: 2024-12-18

## 2024-12-18 RX ORDER — MAGNESIUM HYDROXIDE 1200 MG/15ML
LIQUID ORAL AS NEEDED
Status: DISCONTINUED | OUTPATIENT
Start: 2024-12-18 | End: 2024-12-18 | Stop reason: HOSPADM

## 2024-12-18 RX ORDER — HYDROMORPHONE HCL/PF 1 MG/ML
SYRINGE (ML) INJECTION AS NEEDED
Status: DISCONTINUED | OUTPATIENT
Start: 2024-12-18 | End: 2024-12-18

## 2024-12-18 RX ORDER — IBUPROFEN 600 MG/1
600 TABLET, FILM COATED ORAL EVERY 6 HOURS SCHEDULED
Qty: 30 TABLET | Refills: 0 | Status: SHIPPED | OUTPATIENT
Start: 2024-12-18

## 2024-12-18 RX ORDER — ACETAMINOPHEN 500 MG
1000 TABLET ORAL EVERY 6 HOURS PRN
Qty: 45 TABLET | Refills: 0 | Status: SHIPPED | OUTPATIENT
Start: 2024-12-18

## 2024-12-18 RX ORDER — FENTANYL CITRATE/PF 50 MCG/ML
25 SYRINGE (ML) INJECTION
Status: DISCONTINUED | OUTPATIENT
Start: 2024-12-18 | End: 2024-12-18 | Stop reason: HOSPADM

## 2024-12-18 RX ORDER — MIDAZOLAM HYDROCHLORIDE 2 MG/2ML
INJECTION, SOLUTION INTRAMUSCULAR; INTRAVENOUS AS NEEDED
Status: DISCONTINUED | OUTPATIENT
Start: 2024-12-18 | End: 2024-12-18

## 2024-12-18 RX ORDER — OXYCODONE HYDROCHLORIDE 5 MG/1
5 TABLET ORAL EVERY 8 HOURS PRN
Qty: 12 TABLET | Refills: 0 | Status: SHIPPED | OUTPATIENT
Start: 2024-12-18

## 2024-12-18 RX ORDER — BUPIVACAINE HYDROCHLORIDE 2.5 MG/ML
INJECTION, SOLUTION EPIDURAL; INFILTRATION; INTRACAUDAL AS NEEDED
Status: DISCONTINUED | OUTPATIENT
Start: 2024-12-18 | End: 2024-12-18 | Stop reason: HOSPADM

## 2024-12-18 RX ORDER — ACETAMINOPHEN 325 MG/1
975 TABLET ORAL ONCE
Status: COMPLETED | OUTPATIENT
Start: 2024-12-18 | End: 2024-12-18

## 2024-12-18 RX ORDER — ROCURONIUM BROMIDE 10 MG/ML
INJECTION, SOLUTION INTRAVENOUS AS NEEDED
Status: DISCONTINUED | OUTPATIENT
Start: 2024-12-18 | End: 2024-12-18

## 2024-12-18 RX ADMIN — CEFAZOLIN SODIUM 2000 MG: 2 SOLUTION INTRAVENOUS at 08:10

## 2024-12-18 RX ADMIN — FENTANYL CITRATE 50 MCG: 50 INJECTION, SOLUTION INTRAMUSCULAR; INTRAVENOUS at 08:04

## 2024-12-18 RX ADMIN — PROPOFOL 200 MG: 10 INJECTION, EMULSION INTRAVENOUS at 08:04

## 2024-12-18 RX ADMIN — ROCURONIUM BROMIDE 10 MG: 10 INJECTION, SOLUTION INTRAVENOUS at 08:50

## 2024-12-18 RX ADMIN — ONDANSETRON 4 MG: 2 INJECTION INTRAMUSCULAR; INTRAVENOUS at 08:37

## 2024-12-18 RX ADMIN — GABAPENTIN 100 MG: 100 CAPSULE ORAL at 06:52

## 2024-12-18 RX ADMIN — SUGAMMADEX 200 MG: 100 INJECTION, SOLUTION INTRAVENOUS at 10:14

## 2024-12-18 RX ADMIN — FENTANYL CITRATE 50 MCG: 50 INJECTION, SOLUTION INTRAMUSCULAR; INTRAVENOUS at 08:20

## 2024-12-18 RX ADMIN — HYDROMORPHONE HYDROCHLORIDE 0.5 MG: 1 INJECTION, SOLUTION INTRAMUSCULAR; INTRAVENOUS; SUBCUTANEOUS at 08:59

## 2024-12-18 RX ADMIN — ROCURONIUM BROMIDE 50 MG: 10 INJECTION, SOLUTION INTRAVENOUS at 08:05

## 2024-12-18 RX ADMIN — HYDROMORPHONE HYDROCHLORIDE 0.5 MG: 1 INJECTION, SOLUTION INTRAMUSCULAR; INTRAVENOUS; SUBCUTANEOUS at 08:46

## 2024-12-18 RX ADMIN — LIDOCAINE HYDROCHLORIDE 50 MG: 10 INJECTION, SOLUTION EPIDURAL; INFILTRATION; INTRACAUDAL; PERINEURAL at 08:04

## 2024-12-18 RX ADMIN — FENTANYL CITRATE 25 MCG: 50 INJECTION INTRAMUSCULAR; INTRAVENOUS at 11:04

## 2024-12-18 RX ADMIN — MIDAZOLAM 2 MG: 1 INJECTION INTRAMUSCULAR; INTRAVENOUS at 07:56

## 2024-12-18 RX ADMIN — ACETAMINOPHEN 975 MG: 325 TABLET ORAL at 06:52

## 2024-12-18 RX ADMIN — SODIUM CHLORIDE, SODIUM LACTATE, POTASSIUM CHLORIDE, AND CALCIUM CHLORIDE 75 ML/HR: .6; .31; .03; .02 INJECTION, SOLUTION INTRAVENOUS at 07:06

## 2024-12-18 RX ADMIN — ROCURONIUM BROMIDE 10 MG: 10 INJECTION, SOLUTION INTRAVENOUS at 09:04

## 2024-12-18 RX ADMIN — DEXAMETHASONE SODIUM PHOSPHATE 10 MG: 10 INJECTION, SOLUTION INTRAMUSCULAR; INTRAVENOUS at 08:37

## 2024-12-18 RX ADMIN — SODIUM CHLORIDE, SODIUM LACTATE, POTASSIUM CHLORIDE, AND CALCIUM CHLORIDE: .6; .31; .03; .02 INJECTION, SOLUTION INTRAVENOUS at 09:46

## 2024-12-18 NOTE — PROGRESS NOTES
Patient received from PACU. Alert and oriented, vitals WNL, reports light cramping. Ambulated to bathroom. Unable to void at this time. Refreshments given, family at bedside. Stretcher low and locked; call bell within reach. Will continue to monitor. mSita Carlson

## 2024-12-18 NOTE — ANESTHESIA POSTPROCEDURE EVALUATION
Post-Op Assessment Note    CV Status:  Stable  Pain Score: 2    Pain management: adequate       Mental Status:  Awake   Hydration Status:  Stable   PONV Controlled:  None   Airway Patency:  Patent     Post Op Vitals Reviewed: Yes    No anethesia notable event occurred.    Staff: Anesthesiologist           Last Filed PACU Vitals:  Vitals Value Taken Time   Temp 98 °F (36.7 °C) 12/18/24 1040   Pulse 70 12/18/24 1130   /70 12/18/24 1130   Resp 18 12/18/24 1130   SpO2 99 % 12/18/24 1130       Modified Kelvin:  Activity: 2 (12/18/2024 11:00 AM)  Respiration: 2 (12/18/2024 11:00 AM)  Circulation: 2 (12/18/2024 11:00 AM)  Consciousness: 2 (12/18/2024 11:00 AM)  Oxygen Saturation: 2 (12/18/2024 11:00 AM)  Modified Kelvin Score: 10 (12/18/2024 11:00 AM)

## 2024-12-18 NOTE — H&P
H&P - OB/GYN   Name: Lena Coates 34 y.o. female I MRN: [id number]  Unit/Bed#: OR POOL I Date of Admission: 12/18/2024   Date of Service: 12/18/2024 I Hospital Day: 0     Assessment & Plan  Abnormal uterine bleeding (AUB)  Plan for TLH-BS, cysto    History of Present Illness   Lena Coates is a 34 y.o. female who presents for scheduled surgery.    Planned hysterectomy, removal of tubal remnants and cevix  Discussed cysto    Review of Systems   HENT:  Negative for trouble swallowing.    Respiratory:  Negative for shortness of breath.    Cardiovascular:  Negative for chest pain.   Gastrointestinal:  Negative for abdominal pain.   Genitourinary:  Negative for vaginal bleeding.     Historical Information   I have reviewed the patient's PMH, PSH, Social History, Family History, Meds, and Allergies  OB/GYN History: 2xCS and BTL    Objective :  Temp:  [98.1 °F (36.7 °C)] 98.1 °F (36.7 °C)  HR:  [94] 94  BP: (129)/(61) 129/61  Resp:  [18] 18  SpO2:  [97 %] 97 %  O2 Device: None (Room air)    Physical Exam  Eyes:      Extraocular Movements: Extraocular movements intact.      Conjunctiva/sclera: Conjunctivae normal.   Cardiovascular:      Rate and Rhythm: Normal rate.   Pulmonary:      Effort: Pulmonary effort is normal.   Abdominal:      General: There is no distension.      Palpations: Abdomen is soft.      Tenderness: There is no abdominal tenderness. There is no guarding.   Musculoskeletal:         General: Normal range of motion.      Cervical back: Normal range of motion.   Skin:     General: Skin is warm and dry.   Neurological:      General: No focal deficit present.      Mental Status: She is alert.   Psychiatric:         Mood and Affect: Mood normal.         Behavior: Behavior normal.         Thought Content: Thought content normal.           Lab Results: I have reviewed the following results:CBC/BMP: No new results in last 24 hours.     Administrative Statements

## 2024-12-18 NOTE — PERIOPERATIVE NURSING NOTE
Voided without difficulty. Discharge instructions reviewed with pt, spouse.  Verbalizes understanding.

## 2024-12-18 NOTE — OP NOTE
OPERATIVE REPORT  PATIENT NAME: Lena Coates    :  1990  MRN: [id number]  Pt Location: [facility] OR ROOM 03    SURGERY DATE: 2024    Surgeons and Role:     * Keyla Peter MD - Primary     * Gayatri Rabago MD - Assisting  Physician assistance necessary for this case as no qualified resident was available.      Preop Diagnosis:  Abnormal uterine bleeding [N93.9]    Post-Op Diagnosis Codes:     * Abnormal uterine bleeding [N93.9]    Procedure(s):  Bilateral - HYSTERECTOMY LAPAROSCOPIC TOTAL (LTH) WITH RIGHT PARTIAL SALPINGECTOMY. EXAM UNDER ANESTHESIA  CYSTOSCOPY  Lysis of adhesions    Specimen(s):  ID Type Source Tests Collected by Time Destination   1 : uterus Tissue Uterus TISSUE EXAM Keyla Peter MD 2024 1016    2 : right tubal remnants Tissue Fallopian Tube, Right TISSUE EXAM Keyla Peter MD 2024 1026        Estimated Blood Loss:   Minimal    Drains:  [REMOVED] Urethral Catheter Non-latex 16 Fr. (Removed)   Number of days: 0       Anesthesia Type:   General    Operative Indications:  Abnormal uterine bleeding [N93.9]    Operative Findings:  Slightly enlarged uterus with small patches of clear endometriosis  Partial right fimbria remaining, normal-appearing  Normal-appearing bilateral ovaries  Findings of endometriosis scarring  Ventral hernia above prior  scar with omental adhesions    Complications:   None    Procedure and Technique:  All risks, benefits, and alternatives to the procedure were discussed with the patient and she had the opportunity to ask questions.  Informed consent was obtained.     Patient was taken to the operating room where correct patient and correct procedure were confirmed. General endotracheal anesthesia (GET) was administered and the patient was positioned on the OR table in the dorsal lithotomy position. Arms were positioned in a neutral position at the sides and secured by tucking a draw sheet. All pressure points  were padded and a shaye hugger was placed to maintain control of core body temperature. A bimanual exam was performed and the uterus was noted to be anteverted, slightly enlarged in size and consistency with no palpable adnexal masses or fullness.     The patient was prepped and draped in the usual sterile fashion with chloroprep on the abdomen and CBD prep on the vagina and perineum. A time out was performed per procedure.    Operative Technique  A non-weighted speculum was inserted into the vagina and a Melissa retractor was used to visualize the anterior lip of the cervix, which was then grasped with a single toothed tenaculum. Under direct visualization the uterus was sounded to approximately 8 cm. Cervix was dilated for introduction of the ERIC uterine manipulator. ERIC was secured in place with a stitch of 0 Vicryl.  Henderson catheter was placed and the intravaginal occluder balloon was inflated.  Sterile gloves were exchanged and attention was turned to the abdomen.    A 5 mm skin incision was made in the umbilicus with a scalpel for introduction of a 5mm trochar under direct visualization. Good intraperitoneal location was confirmed and pneumoperitoneum was created to maximal pressure of 15 mmHg.  The patient was placed in Trendelenburg and the above-mentioned findings were noted. Two additional 5mm trocars were placed in a similar fashion in the right and left lower quadrants, approximately 2cm superior and medial to the anterior superior iliac spine.    Anterior abdominal wall omental adhesion taken down carefully with the Enseal device.  The ureters were clearly identified transperitoneally on both sides.    The portion of the right fallopian tube was carefully removed with the Enseal device.     The utero-ovarian ligaments were identified and divided with the Enseal device. The round ligaments were cauterized and divided. The bladder was backfilled to identify the reflection and to assist with careful  dissection of the vesicouterine peritoneal reflection. The bladder was mobilized anteriorly.  The uterine vessels were cauterized and divided bilaterally.  The cardinal ligaments were cauterized and divided bilaterally down to the level of the manipulator cup.  At this point, a circumferential colpotomy was made using electrocautery. The specimen was removed through the vagina.      The left lower quadrant port site was upsized to an 11-mm port and the endostitch used to close the vaginal cuff was closed laparoscopically, with a running stick using absorbable 0-vloc.     Airtight closure and excellent hemostasis was obtained.  All trocars were removed and pneumoperitoneum was completely released with the assistance of Valsalva maneuvers. The fascia at the 11-mm port was closed with 0-vicryl using the j carlos-elizabeth device. The skin at all port sites was closed using a subcuticular stitch of 4-0 Monocryl and skin glue was placed over the incisions. In the umbilicus instead of skin glue, gauze and tegaderm used.    Vaginal exam revealed excellent closure and hemostasis.  The Henderson catheter was removed.  The bladder was filled in retrograde fashion with approximately 300 mL of normal saline.  Using the 5 mm cystoscope, cystoscopy was performed and the above-mentioned findings were noted.  The bladder was emptied with a Henderson catheter which was removed at the completion of the procedure.    At the conclusion of the procedure, all needle, sponge, and instrument counts were noted to be correct x2. Patient tolerated the procedure well and was transferred to PACU in stable condition with plan for discharge to outpatient follow-up.      I was present for the entire procedure.    Patient Disposition:  PACU       SIGNATURE: Keyla Peter MD  DATE: December 18, 2024  TIME: 2:09 PM

## 2024-12-18 NOTE — ANESTHESIA POSTPROCEDURE EVALUATION
Post-Op Assessment Note    CV Status:  Stable  Pain Score: 0    Pain management: adequate       Mental Status:  Sleepy and arousable   Hydration Status:  Stable   PONV Controlled:  Controlled   Airway Patency:  Patent and adequate     Post Op Vitals Reviewed: Yes    No anethesia notable event occurred.    Staff: CRNA           Last Filed PACU Vitals:  Vitals Value Taken Time   Temp     Pulse     BP     Resp     SpO2         Modified Kelvin:  No data recorded

## 2024-12-20 ENCOUNTER — TELEPHONE (OUTPATIENT)
Dept: OBGYN CLINIC | Facility: CLINIC | Age: 34
End: 2024-12-20

## 2024-12-20 NOTE — TELEPHONE ENCOUNTER
S/p TLH, cysto on 12/18     Called to check-in    Pain is manageable with motrin, today is best day  Ice has helped    Bleeding---none from incisions or vagina  Bladder function  Taking miralax in preparation for first BM    No fevers    Pathology pending    RTO for post-op check    Reviewed precautions for calling or presenting for eval--severe pain unrelieved by tylenol/motrin, heavy bleeding, fevers, etc.    Keyla Peter MD   12/20/24   5:22 PM

## 2024-12-26 ENCOUNTER — RESULTS FOLLOW-UP (OUTPATIENT)
Dept: OBGYN CLINIC | Facility: CLINIC | Age: 34
End: 2024-12-26

## 2024-12-26 PROCEDURE — 88305 TISSUE EXAM BY PATHOLOGIST: CPT | Performed by: STUDENT IN AN ORGANIZED HEALTH CARE EDUCATION/TRAINING PROGRAM

## 2024-12-26 PROCEDURE — 88307 TISSUE EXAM BY PATHOLOGIST: CPT | Performed by: STUDENT IN AN ORGANIZED HEALTH CARE EDUCATION/TRAINING PROGRAM

## 2024-12-28 NOTE — PROGRESS NOTES
"Post-operative visit    Subjective     Lena Coates is a 34 y.o. female who presents to the office status post TLH on 12/18/24    Had light pink spotting after, no current bleeding or discharge  Not taking anything now for pain  Was doing advil and tylenol, didn't feel oxy helped      Pathology: A. Uterus, cervix; hysterectomy:       - Unremarkable cervix       - Secretory-phase endometrium with no significant histopathologic abnormalities       - Negative for malignancy     B. Right tubal remnants; excision:       - Portion of cauterized tubal-epithelium and adnexal soft tissue with benign inclusion cyst       - Negative for malignancy    The following portions of the patient's history were reviewed and updated as appropriate:   Past Medical History:   Diagnosis Date    Abnormal weight gain     Anemia 2017    during pregnancy    Candidiasis, mouth     Cellulitis of right arm     Kidney infection     Lyme disease     Pre-employment health screening examination 07/24/2018    SIRS (systemic inflammatory response syndrome) (HCC)     Tinea corporis          Review of Systems  Pertinent items are noted in HPI.      Objective   /80 (BP Location: Left arm, Patient Position: Sitting, Cuff Size: Standard)   Ht 5' 2\" (1.575 m)   Wt 102 kg (225 lb)   LMP 11/15/2024 (Approximate)   BMI 41.15 kg/m²   General:alert and oriented, in no acute distress  Pulm: normal respiratory effort   Abdomen: soft, non-tender  Incision: laparoscopic incision, bruising, no bleeding  Extremities: full range of motion, no lower extremity edema     Assessment    Doing well postoperatively.  Operative findings again reviewed.   Pathology report discussed.      Plan  Discussed fascial dehiscence noted on laparoscopic surgery, no herniation, discussed gen surg consult  Continue pelvic rest until 8 weeks  Rto at 6-8 weeks for cuff assessment    Keyla Peter MD   12/30/24   10:39 AM    "

## 2024-12-30 ENCOUNTER — OFFICE VISIT (OUTPATIENT)
Dept: OBGYN CLINIC | Facility: CLINIC | Age: 34
End: 2024-12-30

## 2024-12-30 VITALS
SYSTOLIC BLOOD PRESSURE: 124 MMHG | BODY MASS INDEX: 41.41 KG/M2 | HEIGHT: 62 IN | DIASTOLIC BLOOD PRESSURE: 80 MMHG | WEIGHT: 225 LBS

## 2024-12-30 DIAGNOSIS — Z90.710 STATUS POST LAPAROSCOPIC HYSTERECTOMY: ICD-10-CM

## 2024-12-30 DIAGNOSIS — Z48.89 ENCOUNTER FOR POSTOPERATIVE CARE: Primary | ICD-10-CM

## 2024-12-30 PROCEDURE — 99024 POSTOP FOLLOW-UP VISIT: CPT | Performed by: STUDENT IN AN ORGANIZED HEALTH CARE EDUCATION/TRAINING PROGRAM

## 2025-01-13 ENCOUNTER — TELEPHONE (OUTPATIENT)
Age: 35
End: 2025-01-13

## 2025-01-13 NOTE — TELEPHONE ENCOUNTER
Agree--if persistent or heavy, should call back as would recommend sooner evaluation  If any vaginal or persistent pelvic pain should be evaluated sooner too  Thanks

## 2025-01-13 NOTE — TELEPHONE ENCOUNTER
Incoming call from patient. Had hysterectomy performed 24. Is currently having pink vaginal spotting/discharge when wiping. Denies red or heavy bleeding. Denies cramping. Only having mild pain at area of  scar which was discussed with provider at post-op appointment. Advised patient that spotting could be due to stitches dissolving. Advised will discuss with provider and return call to patient.     Routing to provider for review.

## 2025-01-20 ENCOUNTER — OFFICE VISIT (OUTPATIENT)
Dept: SURGERY | Facility: CLINIC | Age: 35
End: 2025-01-20
Payer: COMMERCIAL

## 2025-01-20 VITALS
TEMPERATURE: 97.7 F | HEIGHT: 62 IN | WEIGHT: 233.4 LBS | BODY MASS INDEX: 42.95 KG/M2 | HEART RATE: 92 BPM | OXYGEN SATURATION: 98 % | DIASTOLIC BLOOD PRESSURE: 84 MMHG | SYSTOLIC BLOOD PRESSURE: 123 MMHG

## 2025-01-20 PROCEDURE — 99204 OFFICE O/P NEW MOD 45 MIN: CPT | Performed by: SURGERY

## 2025-01-20 NOTE — PROGRESS NOTES
"Name: Lena Coates      : 1990      MRN: [id number]  Encounter Provider: Pipe Granado MD  Encounter Date: 2025   Encounter department: [facility]  :  Assessment & Plan    Posterior rectus fascia missing low midline, however, rectus muscles are intact as well as anterior rectus fascia on physical exam.  No signs of an incisional hernia.  Patient may perform all physical activities including core strengthening exercises.    History of Present Illness   HPI  Lena Coates is a 34 y.o. female with morbid obesity who is status post TLH on 24.  Patient was referred to my office for evaluation of \"fascial dehiscence of  incision\" during her recent surgery.  Photographs in epic reveal posterior rectus fascia absent in the low midline, however, rectus muscles are reapproximated.        Review of Systems   Constitutional:  Negative for chills and fever.   HENT: Negative.     Respiratory: Negative.     Cardiovascular: Negative.    Gastrointestinal: Negative.    Genitourinary: Negative.    Musculoskeletal: Negative.    Neurological: Negative.    Hematological: Negative.    All other systems reviewed and are negative.       Objective   LMP 11/15/2024 (Approximate)      Physical Exam  Vitals reviewed.   Constitutional:       General: She is not in acute distress.     Appearance: She is obese.   Cardiovascular:      Rate and Rhythm: Normal rate.   Pulmonary:      Effort: Pulmonary effort is normal.   Abdominal:      Comments: Obese, no hernia palpable with Valsalva or sit ups   Musculoskeletal:         General: Normal range of motion.   Skin:     General: Skin is warm and dry.   Neurological:      General: No focal deficit present.      Mental Status: She is alert.         "

## 2025-02-06 ENCOUNTER — OFFICE VISIT (OUTPATIENT)
Dept: OBGYN CLINIC | Facility: CLINIC | Age: 35
End: 2025-02-06

## 2025-02-06 VITALS — WEIGHT: 232 LBS | DIASTOLIC BLOOD PRESSURE: 78 MMHG | SYSTOLIC BLOOD PRESSURE: 118 MMHG | BODY MASS INDEX: 42.43 KG/M2

## 2025-02-06 DIAGNOSIS — Z48.89 ENCOUNTER FOR POSTOPERATIVE CARE: Primary | ICD-10-CM

## 2025-02-06 DIAGNOSIS — Z90.710 STATUS POST LAPAROSCOPIC HYSTERECTOMY: ICD-10-CM

## 2025-02-06 PROCEDURE — 99024 POSTOP FOLLOW-UP VISIT: CPT | Performed by: STUDENT IN AN ORGANIZED HEALTH CARE EDUCATION/TRAINING PROGRAM

## 2025-02-06 NOTE — PROGRESS NOTES
Post-operative visit    Jayden Coates is a 34 y.o. female who presents to the office status post TLH on 12/18/24    Started supplements, started walking 12k steps a day/week  No spotting  No bleeding    No dysuria, hematuria  Normal bowel movements      Pathology: A. Uterus, cervix; hysterectomy:       - Unremarkable cervix       - Secretory-phase endometrium with no significant histopathologic abnormalities       - Negative for malignancy     B. Right tubal remnants; excision:       - Portion of cauterized tubal-epithelium and adnexal soft tissue with benign inclusion cyst       - Negative for malignancy    The following portions of the patient's history were reviewed and updated as appropriate:   Past Medical History:   Diagnosis Date    Abnormal weight gain     Anemia 2017    during pregnancy    Candidiasis, mouth     Cellulitis of right arm     Kidney infection     Lyme disease     Pre-employment health screening examination 07/24/2018    SIRS (systemic inflammatory response syndrome) (HCC)     Tinea corporis        Review of Systems  Pertinent items are noted in HPI.      Objective   /78 (BP Location: Right arm, Patient Position: Sitting, Cuff Size: Standard)   Wt 105 kg (232 lb)   LMP 11/15/2024 (Approximate)   BMI 42.43 kg/m²   General:alert and oriented, in no acute distress  Pulm: normal respiratory effort   Abdomen: soft, non-tender  Incision: laparoscopic incision, bruising, no bleeding  Extremities: full range of motion, no lower extremity edema    Pelvic  Vulva: normal, no lesions  Vagina: normal, no lesions or ttp  Urethra: normal, no lesions, masses or ttp  Bladder: normal, no masses or ttp  Cervix: surgical absent  Uterus: surgical absent  Adnexa: no masses or ttp       Assessment    Doing well postoperatively.     Plan  S/p gen surgery consult for fascial dehiscence noted on laparoscopic surgery, further surgery not indicated   Continue pelvic rest until next  Wednesday    Keyla Peter MD   02/06/25   1:23 PM

## 2025-02-18 ENCOUNTER — OFFICE VISIT (OUTPATIENT)
Dept: PAIN MEDICINE | Facility: CLINIC | Age: 35
End: 2025-02-18
Payer: COMMERCIAL

## 2025-02-18 VITALS — BODY MASS INDEX: 42.69 KG/M2 | WEIGHT: 232 LBS | HEIGHT: 62 IN

## 2025-02-18 DIAGNOSIS — M46.1 SACROILIITIS (HCC): Primary | ICD-10-CM

## 2025-02-18 PROCEDURE — 99203 OFFICE O/P NEW LOW 30 MIN: CPT | Performed by: STUDENT IN AN ORGANIZED HEALTH CARE EDUCATION/TRAINING PROGRAM

## 2025-02-18 RX ORDER — MELOXICAM 15 MG/1
15 TABLET ORAL DAILY
Qty: 30 TABLET | Refills: 1 | Status: SHIPPED | OUTPATIENT
Start: 2025-02-18

## 2025-02-18 NOTE — PROGRESS NOTES
Assessment:  1. Sacroiliitis (HCC)        Plan:  No orders of the defined types were placed in this encounter.      New Medications Ordered This Visit   Medications    meloxicam (MOBIC) 15 mg tablet     Sig: Take 1 tablet (15 mg total) by mouth daily     Dispense:  30 tablet     Refill:  1       My impressions and treatment recommendations were discussed in detail with the patient, who verbalized understanding and had no further questions.    Lena is a very pleasant 34-year-old female who presents today for evaluation management of low back and right buttock pain.  I independently interpreted the patient's lumbar radiographs from 2022 which is largely normal.  Based on the patient's history and physical examination, it appears that her pain is more related to sacroiliac joint dysfunction.  We discussed management including activity modification, therapeutic exercise for core, lumbar, and pelvic girdle strengthening and stabilization, use of SI joint belt, nonsteroidal anti-inflammatory medications and diagnostic/therapeutic sacroiliac joint injection.    Given that the patient's  is  an  and has been doing biomechanical training with the patient at home as well as utilizing modalities including Graston and cupping, I did provide her with a handout for additional pelvic exercises she can do at home with resistance bands.  I have also provided her a prescription for meloxicam to be taken daily.  Will follow-up in 6 weeks to see how she is progressing with her home exercise program and with the medication.  If failure to improve, we did discuss diagnostic and therapeutic sacroiliac joint injection.    Follow-up is planned in 6 weeks time or sooner as warranted.  Discharge instructions were provided. I personally saw and examined the patient and I agree with the above discussed plan of care.    I have spent a total time of 35 minutes in caring for this patient on the day of the visit/encounter  including Diagnostic results, Prognosis, Risks and benefits of tx options, Instructions for management, Impressions, Documenting in the medical record, Reviewing/placing orders in the medical record (including tests, medications, and/or procedures), and Obtaining or reviewing history  .     History of Present Illness:    Lena Coates is a 34 y.o. female who presents to [facility] for initial evaluation of the above stated pain complaints. The patient has a past medical and chronic pain history as outlined in the assessment section. She was referred by Referral Self  No address on file .    34-year-old female with past medical history of osteoarthritis, renal stones presents today for evaluation and management of low back pain.  Pains been present for roughly 2 years.  She denies any specific inciting event however notes pain became more frequent following hysterectomy.  Pain is rated as moderate to severe and is currently 7 out of 10.  She notes interference with daily activities.  The pain is nearly constant worse in the morning.  Pain is associated with burning, pins-and-needles, and throbbing.  She reports weakness in her lower limb.  The pain is located across the lumbosacral junction into the right buttock.  Pain is improved with walking made worse with lying down and sitting.  She does therapeutic exercise of moderate relief.  She also uses heat and ice as well as has done acupuncture with moderate relief.  She has taken Tylenol in the past and is currently taking ibuprofen which does provide some relief.  She presents today for further evaluation.    Review of Systems:    Review of Systems   Constitutional:  Negative for fever and unexpected weight change.   HENT:  Negative for trouble swallowing.    Eyes:  Negative for visual disturbance.   Respiratory:  Negative for shortness of breath and wheezing.    Cardiovascular:  Negative for chest pain and palpitations.    Gastrointestinal:  Negative for constipation, diarrhea, nausea and vomiting.   Endocrine: Negative for cold intolerance, heat intolerance and polydipsia.   Genitourinary:  Negative for difficulty urinating and frequency.   Musculoskeletal:  Positive for back pain, gait problem and joint swelling. Negative for arthralgias and myalgias.        Decreased rom   Skin:  Negative for rash.   Neurological:  Negative for dizziness, seizures, syncope, weakness and headaches.   Hematological:  Does not bruise/bleed easily.   Psychiatric/Behavioral:  Positive for sleep disturbance. Negative for dysphoric mood.    All other systems reviewed and are negative.          Past Medical History:   Diagnosis Date    Abnormal weight gain     Anemia 2017    during pregnancy    Candidiasis, mouth     Cellulitis of right arm     Kidney infection     Lyme disease     Pre-employment health screening examination 2018    SIRS (systemic inflammatory response syndrome) (HCC)     Tinea corporis        Past Surgical History:   Procedure Laterality Date    BREAST BIOPSY       SECTION      FOOT SURGERY      HYSTERECTOMY  2024    Uterus and cervix    AZ CYSTOURETHROSCOPY N/A 2024    Procedure: CYSTOSCOPY;  Surgeon: Keyla Peter MD;  Location: [facility];  Service: Gynecology    AZ LAPS TOTAL HYSTERECT 250 GM/< W/RMVL TUBE/OVARY Bilateral 2024    Procedure: HYSTERECTOMY LAPAROSCOPIC TOTAL (LTH) WITH RIGHT PARTIAL SALPINGECTOMY, LYSIS OF ADHESIONS, EXAM UNDER ANESTHESIA;  Surgeon: Keyla Peter MD;  Location: [facility];  Service: Gynecology    SALPINGECTOMY Bilateral     TUBAL LIGATION  2021    Tubes removed    US GUIDED BREAST BIOPSY RIGHT COMPLETE Right 2021       Family History   Problem Relation Age of Onset    Diabetes Mother     Atrial fibrillation Mother     No Known Problems Father     Diabetes Maternal Grandmother     Cancer Maternal Grandfather     Cancer Paternal Grandfather  "    Depression Half-Sister     Learning disabilities Half-Brother     Auditory processing disorder Half-Brother     Breast cancer Other        Social History     Occupational History    Not on file   Tobacco Use    Smoking status: Never     Passive exposure: Never    Smokeless tobacco: Never   Vaping Use    Vaping status: Never Used   Substance and Sexual Activity    Alcohol use: Yes     Comment: social    Drug use: No    Sexual activity: Not Currently     Partners: Male     Birth control/protection: Female Sterilization         Current Outpatient Medications:     meloxicam (MOBIC) 15 mg tablet, Take 1 tablet (15 mg total) by mouth daily, Disp: 30 tablet, Rfl: 1    acetaminophen (TYLENOL) 500 mg tablet, Take 2 tablets (1,000 mg total) by mouth every 6 (six) hours as needed for mild pain or moderate pain (Patient not taking: Reported on 2/18/2025), Disp: 45 tablet, Rfl: 0    clobetasol (TEMOVATE) 0.05 % external solution, Apply topically 2 (two) times a day (Patient not taking: Reported on 2/18/2025), Disp: 25 mL, Rfl: 0    ibuprofen (MOTRIN) 600 mg tablet, Take 1 tablet (600 mg total) by mouth every 6 (six) hours (Patient not taking: Reported on 2/18/2025), Disp: 30 tablet, Rfl: 0    oxyCODONE (Roxicodone) 5 immediate release tablet, Take 1 tablet (5 mg total) by mouth every 8 (eight) hours as needed for severe pain for up to 12 doses Max Daily Amount: 15 mg (Patient not taking: Reported on 12/30/2024), Disp: 12 tablet, Rfl: 0    No Known Allergies    Physical Exam:    Ht 5' 2\" (1.575 m)   Wt 105 kg (232 lb)   LMP 11/15/2024 (Approximate)   BMI 42.43 kg/m²     Constitutional: normal, well developed, well nourished, alert, in no distress and non-toxic and no overt pain behavior.  Eyes: anicteric  HEENT: grossly intact  Neck: supple, symmetric, trachea midline and no masses   Pulmonary:even and unlabored  Cardiovascular:No edema or pitting edema present  Skin:Normal without rashes or lesions and well " hydrated  Psychiatric:Mood and affect appropriate  Neurologic:Cranial Nerves II-XII grossly intact  Musculoskeletal: Range of motion of the lumbar spine is full.  Positive facet loading on the right.  Negative straight leg raise bilaterally.  Positive Acosta's, Gaenslen's, and SI compression test on the right.    Imaging  No orders to display   10/11/2022  Narrative & Impression   LUMBAR SPINE     INDICATION:   M54.50: Low back pain, unspecified.     COMPARISON:  None     VIEWS:  XR SPINE LUMBAR 2 OR 3 VIEWS INJURY        FINDINGS:     There are 5 non rib bearing lumbar vertebral bodies.      There is no evidence of acute fracture or destructive osseous lesion.     Alignment is unremarkable.      No significant lumbar degenerative change noted.     The pedicles appear intact.     Soft tissues are unremarkable.     IMPRESSION:     Normal examination.        Workstation performed: [id number]     No orders of the defined types were placed in this encounter.

## 2025-03-26 ENCOUNTER — OFFICE VISIT (OUTPATIENT)
Dept: FAMILY MEDICINE CLINIC | Facility: CLINIC | Age: 35
End: 2025-03-26
Payer: COMMERCIAL

## 2025-03-26 VITALS
WEIGHT: 224 LBS | HEART RATE: 88 BPM | DIASTOLIC BLOOD PRESSURE: 70 MMHG | SYSTOLIC BLOOD PRESSURE: 124 MMHG | TEMPERATURE: 97.8 F | RESPIRATION RATE: 20 BRPM | HEIGHT: 62 IN | BODY MASS INDEX: 41.22 KG/M2

## 2025-03-26 DIAGNOSIS — J02.9 ACUTE PHARYNGITIS, UNSPECIFIED ETIOLOGY: Primary | ICD-10-CM

## 2025-03-26 LAB — S PYO AG THROAT QL: NEGATIVE

## 2025-03-26 PROCEDURE — 87880 STREP A ASSAY W/OPTIC: CPT | Performed by: NURSE PRACTITIONER

## 2025-03-26 PROCEDURE — 99213 OFFICE O/P EST LOW 20 MIN: CPT | Performed by: NURSE PRACTITIONER

## 2025-03-26 RX ORDER — AMOXICILLIN 875 MG/1
875 TABLET, COATED ORAL 2 TIMES DAILY
Qty: 20 TABLET | Refills: 0 | Status: SHIPPED | OUTPATIENT
Start: 2025-03-26 | End: 2025-04-05

## 2025-03-26 NOTE — PROGRESS NOTES
"Name: Lena Coates      : 1990      MRN: [id number]  Encounter Provider: BETTY Mejia  Encounter Date: 3/26/2025   Encounter department: [facility]  :  Assessment & Plan  Acute pharyngitis, unspecified etiology  Rapid strep is get up, will cover with amoxicillin given her history.  Reviewed symptomatic treatment, she will follow-up as needed  Orders:  •  amoxicillin (AMOXIL) 875 mg tablet; Take 1 tablet (875 mg total) by mouth 2 (two) times a day for 10 days  •  POCT rapid ANTIGEN strepA           History of Present Illness   Developed scratchy throat yesterday.  Today throat is extremely pale, similar to when she has had strep in the past.  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  No associated fevers      Review of Systems   Constitutional:  Positive for appetite change. Negative for chills, fatigue and fever.   HENT:  Positive for sore throat. Negative for congestion, ear pain, postnasal drip, rhinorrhea and sinus pressure.    Respiratory:  Positive for cough. Negative for shortness of breath and wheezing.    Cardiovascular:  Negative for chest pain.   Gastrointestinal:  Negative for abdominal pain, diarrhea, nausea and vomiting.   Musculoskeletal:  Negative for arthralgias.   Skin:  Negative for rash.   Neurological:  Positive for headaches.       Objective   /70   Pulse 88   Temp 97.8 °F (36.6 °C)   Resp 20   Ht 5' 2\" (1.575 m)   Wt 102 kg (224 lb)   LMP 11/15/2024 (Approximate)   BMI 40.97 kg/m²      Physical Exam  Vitals and nursing note reviewed.   Constitutional:       General: She is not in acute distress.     Appearance: Normal appearance.   HENT:      Head: Normocephalic and atraumatic.      Comments: Tonsillar hypertrophy      Right Ear: Tympanic membrane normal.      Left Ear: Tympanic membrane normal.      Nose: Nose normal.      Mouth/Throat:      Pharynx: Posterior oropharyngeal erythema present. No oropharyngeal exudate.   Eyes:      Conjunctiva/sclera: " Conjunctivae normal.   Neck:      Vascular: No carotid bruit.   Cardiovascular:      Rate and Rhythm: Normal rate and regular rhythm.      Pulses: Normal pulses.      Heart sounds: Normal heart sounds. No murmur heard.  Pulmonary:      Effort: Pulmonary effort is normal.      Breath sounds: Normal breath sounds.   Skin:     General: Skin is warm and dry.   Neurological:      Mental Status: She is alert.   Psychiatric:         Mood and Affect: Mood normal.         Behavior: Behavior normal.

## 2025-03-26 NOTE — LETTER
March 26, 2025     Patient: Lena Coates  YOB: 1990  Date of Visit: 3/26/2025      To Whom it May Concern:    Lena Coates is under my professional care. Lena was seen in my office on 3/26/2025. Please excuse from work.    If you have any questions or concerns, please don't hesitate to call.         Sincerely,          BETTY Mejia        CC: No Recipients

## 2025-04-10 DIAGNOSIS — Z13.6 SCREENING FOR CARDIOVASCULAR CONDITION: ICD-10-CM

## 2025-04-10 DIAGNOSIS — R73.09 ABNORMAL GLUCOSE: Primary | ICD-10-CM

## 2025-04-10 DIAGNOSIS — E55.9 VITAMIN D DEFICIENCY: ICD-10-CM

## 2025-04-10 DIAGNOSIS — Z13.0 SCREENING FOR DEFICIENCY ANEMIA: ICD-10-CM

## 2025-04-11 ENCOUNTER — RA CDI HCC (OUTPATIENT)
Dept: OTHER | Facility: HOSPITAL | Age: 35
End: 2025-04-11

## 2025-04-11 DIAGNOSIS — E66.01 MORBID OBESITY (HCC): Primary | ICD-10-CM

## 2025-04-11 PROBLEM — E66.9 OBESE: Status: RESOLVED | Noted: 2024-04-12 | Resolved: 2025-04-11

## 2025-04-11 NOTE — PROGRESS NOTES
HCC coding opportunities          Chart Reviewed number of suggestions sent to Provider: 1     Patients Insurance        Commercial Insurance: [employer]     E66.01

## 2025-04-15 LAB
25(OH)D3+25(OH)D2 SERPL-MCNC: 36.4 NG/ML (ref 30–100)
ALBUMIN SERPL-MCNC: 4.1 G/DL (ref 3.9–4.9)
ALP SERPL-CCNC: 50 IU/L (ref 44–121)
ALT SERPL-CCNC: 17 IU/L (ref 0–32)
AST SERPL-CCNC: 22 IU/L (ref 0–40)
BILIRUB SERPL-MCNC: 0.2 MG/DL (ref 0–1.2)
BUN SERPL-MCNC: 16 MG/DL (ref 6–20)
BUN/CREAT SERPL: 23 (ref 9–23)
CALCIUM SERPL-MCNC: 9.5 MG/DL (ref 8.7–10.2)
CHLORIDE SERPL-SCNC: 104 MMOL/L (ref 96–106)
CHOLEST SERPL-MCNC: 197 MG/DL (ref 100–199)
CO2 SERPL-SCNC: 21 MMOL/L (ref 20–29)
CREAT SERPL-MCNC: 0.71 MG/DL (ref 0.57–1)
EGFR: 114 ML/MIN/1.73
ERYTHROCYTE [DISTWIDTH] IN BLOOD BY AUTOMATED COUNT: 13.2 % (ref 11.7–15.4)
EST. AVERAGE GLUCOSE BLD GHB EST-MCNC: 114 MG/DL
GLOBULIN SER-MCNC: 2.7 G/DL (ref 1.5–4.5)
GLUCOSE SERPL-MCNC: 96 MG/DL (ref 70–99)
HBA1C MFR BLD: 5.6 % (ref 4.8–5.6)
HCT VFR BLD AUTO: 42.4 % (ref 34–46.6)
HDLC SERPL-MCNC: 47 MG/DL
HGB BLD-MCNC: 13.6 G/DL (ref 11.1–15.9)
LDLC SERPL CALC-MCNC: 138 MG/DL (ref 0–99)
LDLC/HDLC SERPL: 2.9 RATIO (ref 0–3.2)
MCH RBC QN AUTO: 28.6 PG (ref 26.6–33)
MCHC RBC AUTO-ENTMCNC: 32.1 G/DL (ref 31.5–35.7)
MCV RBC AUTO: 89 FL (ref 79–97)
MICRODELETION SYND BLD/T FISH: NORMAL
PLATELET # BLD AUTO: 295 X10E3/UL (ref 150–450)
POTASSIUM SERPL-SCNC: 5.2 MMOL/L (ref 3.5–5.2)
PROT SERPL-MCNC: 6.8 G/DL (ref 6–8.5)
RBC # BLD AUTO: 4.75 X10E6/UL (ref 3.77–5.28)
SL AMB VLDL CHOLESTEROL CALC: 12 MG/DL (ref 5–40)
SODIUM SERPL-SCNC: 140 MMOL/L (ref 134–144)
TRIGL SERPL-MCNC: 64 MG/DL (ref 0–149)
WBC # BLD AUTO: 5.2 X10E3/UL (ref 3.4–10.8)

## 2025-04-18 ENCOUNTER — OFFICE VISIT (OUTPATIENT)
Dept: FAMILY MEDICINE CLINIC | Facility: CLINIC | Age: 35
End: 2025-04-18
Payer: COMMERCIAL

## 2025-04-18 ENCOUNTER — RESULTS FOLLOW-UP (OUTPATIENT)
Dept: FAMILY MEDICINE CLINIC | Facility: CLINIC | Age: 35
End: 2025-04-18

## 2025-04-18 VITALS
DIASTOLIC BLOOD PRESSURE: 78 MMHG | WEIGHT: 222 LBS | OXYGEN SATURATION: 98 % | TEMPERATURE: 98.2 F | HEIGHT: 62 IN | HEART RATE: 88 BPM | RESPIRATION RATE: 18 BRPM | SYSTOLIC BLOOD PRESSURE: 122 MMHG | BODY MASS INDEX: 40.85 KG/M2

## 2025-04-18 DIAGNOSIS — Z13.0 SCREENING FOR DEFICIENCY ANEMIA: ICD-10-CM

## 2025-04-18 DIAGNOSIS — R73.09 ABNORMAL GLUCOSE: ICD-10-CM

## 2025-04-18 DIAGNOSIS — Z00.00 ANNUAL PHYSICAL EXAM: Primary | ICD-10-CM

## 2025-04-18 DIAGNOSIS — Z13.6 SCREENING FOR CARDIOVASCULAR CONDITION: ICD-10-CM

## 2025-04-18 PROCEDURE — 99395 PREV VISIT EST AGE 18-39: CPT | Performed by: FAMILY MEDICINE

## 2025-04-18 NOTE — PATIENT INSTRUCTIONS
"Patient Education     Routine physical for adults   The Basics   Written by the doctors and editors at [facility]   What is a physical? -- A physical is a routine visit, or \"check-up,\" with your doctor. You might also hear it called a \"wellness visit\" or \"preventive visit.\"  During each visit, the doctor will:   Ask about your physical and mental health   Ask about your habits, behaviors, and lifestyle   Do an exam   Give you vaccines if needed   Talk to you about any medicines you take   Give advice about your health   Answer your questions  Getting regular check-ups is an important part of taking care of your health. It can help your doctor find and treat any problems you have. But it's also important for preventing health problems.  A routine physical is different from a \"sick visit.\" A sick visit is when you see a doctor because of a health concern or problem. Since physicals are scheduled ahead of time, you can think about what you want to ask the doctor.  How often should I get a physical? -- It depends on your age and health. In general, for people age 21 years and older:   If you are younger than 50 years, you might be able to get a physical every 3 years.   If you are 50 years or older, your doctor might recommend a physical every year.  If you have an ongoing health condition, like diabetes or high blood pressure, your doctor will probably want to see you more often.  What happens during a physical? -- In general, each visit will include:   Physical exam - The doctor or nurse will check your height, weight, heart rate, and blood pressure. They will also look at your eyes and ears. They will ask about how you are feeling and whether you have any symptoms that bother you.   Medicines - It's a good idea to bring a list of all the medicines you take to each doctor visit. Your doctor will talk to you about your medicines and answer any questions. Tell them if you are having any side effects that bother you. You " "should also tell them if you are having trouble paying for any of your medicines.   Habits and behaviors - This includes:   Your diet   Your exercise habits   Whether you smoke, drink alcohol, or use drugs   Whether you are sexually active   Whether you feel safe at home  Your doctor will talk to you about things you can do to improve your health and lower your risk of health problems. They will also offer help and support. For example, if you want to quit smoking, they can give you advice and might prescribe medicines. If you want to improve your diet or get more physical activity, they can help you with this, too.   Lab tests, if needed - The tests you get will depend on your age and situation. For example, your doctor might want to check your:   Cholesterol   Blood sugar   Iron level   Vaccines - The recommended vaccines will depend on your age, health, and what vaccines you already had. Vaccines are very important because they can prevent certain serious or deadly infections.   Discussion of screening - \"Screening\" means checking for diseases or other health problems before they cause symptoms. Your doctor can recommend screening based on your age, risk, and preferences. This might include tests to check for:   Cancer, such as breast, prostate, cervical, ovarian, colorectal, prostate, lung, or skin cancer   Sexually transmitted infections, such as chlamydia and gonorrhea   Mental health conditions like depression and anxiety  Your doctor will talk to you about the different types of screening tests. They can help you decide which screenings to have. They can also explain what the results might mean.   Answering questions - The physical is a good time to ask the doctor or nurse questions about your health. If needed, they can refer you to other doctors or specialists, too.  Adults older than 65 years often need other care, too. As you get older, your doctor will talk to you about:   How to prevent falling at " home   Hearing or vision tests   Memory testing   How to take your medicines safely   Making sure that you have the help and support you need at home  All topics are updated as new evidence becomes available and our peer review process is complete.  This topic retrieved from [employer] on: May 02, 2024.  Topic 648785 Version 1.0  Release: 32.4.3 - C32.122  © 2024 UpToDate, Inc. and/or its affiliates. All rights reserved.  Consumer Information Use and Disclaimer   Disclaimer: This generalized information is a limited summary of diagnosis, treatment, and/or medication information. It is not meant to be comprehensive and should be used as a tool to help the user understand and/or assess potential diagnostic and treatment options. It does NOT include all information about conditions, treatments, medications, side effects, or risks that may apply to a specific patient. It is not intended to be medical advice or a substitute for the medical advice, diagnosis, or treatment of a health care provider based on the health care provider's examination and assessment of a patient's specific and unique circumstances. Patients must speak with a health care provider for complete information about their health, medical questions, and treatment options, including any risks or benefits regarding use of medications. This information does not endorse any treatments or medications as safe, effective, or approved for treating a specific patient. UpToDate, Inc. and its affiliates disclaim any warranty or liability relating to this information or the use thereof.The use of this information is governed by the Terms of Use, available at https://www.woltersSourceMedicaluwer.com/en/know/clinical-effectiveness-terms. 2024© UpToDate, Inc. and its affiliates and/or licensors. All rights reserved.  Copyright   © 2024 UpToDate, Inc. and/or its affiliates. All rights reserved.

## 2025-04-18 NOTE — PROGRESS NOTES
Adult Annual Physical  Name: Lena Coates      : 1990      MRN: [id number]  Encounter Provider: Leigh Baker DO  Encounter Date: 2025   Encounter department: [facility]    :  Assessment & Plan  Annual physical exam         Abnormal glucose    Orders:  •  Hemoglobin A1C; Future    Screening for deficiency anemia    Orders:  •  CBC; Future    Screening for cardiovascular condition    Orders:  •  Comprehensive metabolic panel; Future  •  Lipid Panel with Direct LDL reflex; Future        Preventive Screenings:  - Diabetes Screening: screening up-to-date  - Hepatitis C screening: screening up-to-date   - HIV screening: screening up-to-date   - Cervical cancer screening: screening not indicated   - Colon cancer screening: screening not indicated   - Lung cancer screening: screening not indicated     Immunizations:  - Immunizations due: Influenza    Counseling/Anticipatory Guidance:    - Dental health: discussed importance of regular tooth brushing, flossing, and dental visits.   - Diet: discussed recommendations for a healthy/well-balanced diet.   - Exercise: the importance of regular exercise/physical activity was discussed. Recommend exercise 3-5 times per week for at least 30 minutes.      Return in about 1 year (around 2026) for Annual physical.    History of Present Illness     Adult Annual Physical:  Patient presents for annual physical. She has lost 12 pounds.  She is watching her diet .     Diet and Physical Activity:  - Diet/Nutrition: well balanced diet. high protein  - Exercise: strength training exercises, moderate cardiovascular exercise, 5-7 times a week on average and 30-60 minutes on average.    Depression Screening:  - PHQ-2 Score: 0    General Health:  - Sleep: sleeps well and 4-6 hours of sleep on average.  - Hearing: normal hearing bilateral ears.  - Vision: no vision problems, wears glasses and most recent eye exam < 1 year ago.  - Dental: regular dental visits,  "brushes teeth once daily and floss regularly.    /GYN Health:  - Follows with GYN: yes.   - Menopause: premenopausal.   - Contraception: tubal ligation and hysterectomy.      Review of Systems      Objective   /78   Pulse 88   Temp 98.2 °F (36.8 °C)   Resp 18   Ht 5' 2\" (1.575 m)   Wt 101 kg (222 lb)   LMP 11/15/2024 (Approximate)   SpO2 98%   BMI 40.60 kg/m²     Physical Exam  Vitals and nursing note reviewed.   Constitutional:       Appearance: She is well-developed.   HENT:      Head: Normocephalic and atraumatic.      Right Ear: External ear normal.      Left Ear: External ear normal.      Nose: Nose normal.   Cardiovascular:      Rate and Rhythm: Normal rate and regular rhythm.      Heart sounds: Normal heart sounds. No murmur heard.     No friction rub.   Pulmonary:      Effort: No respiratory distress.      Breath sounds: Normal breath sounds. No wheezing or rales.   Abdominal:      Palpations: Abdomen is soft.      Tenderness: There is no abdominal tenderness.   Musculoskeletal:      Right lower leg: No edema.      Left lower leg: No edema.   Neurological:      Mental Status: She is oriented to person, place, and time.      Cranial Nerves: No cranial nerve deficit.         "

## 2025-04-22 ENCOUNTER — HOSPITAL ENCOUNTER (EMERGENCY)
Facility: HOSPITAL | Age: 35
Discharge: HOME | End: 2025-04-22
Attending: EMERGENCY MEDICINE
Payer: COMMERCIAL

## 2025-04-22 ENCOUNTER — APPOINTMENT (EMERGENCY)
Dept: RADIOLOGY | Facility: HOSPITAL | Age: 35
End: 2025-04-22
Payer: COMMERCIAL

## 2025-04-22 VITALS
WEIGHT: 218 LBS | TEMPERATURE: 97.9 F | RESPIRATION RATE: 18 BRPM | HEART RATE: 111 BPM | OXYGEN SATURATION: 99 % | SYSTOLIC BLOOD PRESSURE: 131 MMHG | BODY MASS INDEX: 39.87 KG/M2 | DIASTOLIC BLOOD PRESSURE: 70 MMHG

## 2025-04-22 DIAGNOSIS — R10.9 ABDOMINAL PAIN: ICD-10-CM

## 2025-04-22 DIAGNOSIS — R11.0 NAUSEA: ICD-10-CM

## 2025-04-22 DIAGNOSIS — K57.92 DIVERTICULITIS: Primary | ICD-10-CM

## 2025-04-22 LAB
ALBUMIN SERPL BCG-MCNC: 4.2 G/DL (ref 3.5–5)
ALP SERPL-CCNC: 43 U/L (ref 34–104)
ALT SERPL W P-5'-P-CCNC: 16 U/L (ref 7–52)
ANION GAP SERPL CALCULATED.3IONS-SCNC: 10 MMOL/L (ref 4–13)
AST SERPL W P-5'-P-CCNC: 16 U/L (ref 13–39)
BASOPHILS # BLD AUTO: 0.05 THOUSANDS/ÂΜL (ref 0–0.1)
BASOPHILS NFR BLD AUTO: 1 % (ref 0–1)
BILIRUB SERPL-MCNC: 0.42 MG/DL (ref 0.2–1)
BILIRUB UR QL STRIP: NEGATIVE
BUN SERPL-MCNC: 15 MG/DL (ref 5–25)
CALCIUM SERPL-MCNC: 9.2 MG/DL (ref 8.4–10.2)
CHLORIDE SERPL-SCNC: 102 MMOL/L (ref 96–108)
CLARITY UR: CLEAR
CO2 SERPL-SCNC: 23 MMOL/L (ref 21–32)
COLOR UR: COLORLESS
CREAT SERPL-MCNC: 0.74 MG/DL (ref 0.6–1.3)
EOSINOPHIL # BLD AUTO: 0.1 THOUSAND/ÂΜL (ref 0–0.61)
EOSINOPHIL NFR BLD AUTO: 1 % (ref 0–6)
ERYTHROCYTE [DISTWIDTH] IN BLOOD BY AUTOMATED COUNT: 13.3 % (ref 11.6–15.1)
GFR SERPL CREATININE-BSD FRML MDRD: 106 ML/MIN/1.73SQ M
GLUCOSE SERPL-MCNC: 124 MG/DL (ref 65–140)
GLUCOSE UR STRIP-MCNC: NEGATIVE MG/DL
HCT VFR BLD AUTO: 41.2 % (ref 34.8–46.1)
HGB BLD-MCNC: 13.6 G/DL (ref 11.5–15.4)
HGB UR QL STRIP.AUTO: NEGATIVE
IMM GRANULOCYTES # BLD AUTO: 0.07 THOUSAND/UL (ref 0–0.2)
IMM GRANULOCYTES NFR BLD AUTO: 1 % (ref 0–2)
KETONES UR STRIP-MCNC: NEGATIVE MG/DL
LEUKOCYTE ESTERASE UR QL STRIP: NEGATIVE
LIPASE SERPL-CCNC: 17 U/L (ref 11–82)
LYMPHOCYTES # BLD AUTO: 2.03 THOUSANDS/ÂΜL (ref 0.6–4.47)
LYMPHOCYTES NFR BLD AUTO: 19 % (ref 14–44)
MCH RBC QN AUTO: 29.2 PG (ref 26.8–34.3)
MCHC RBC AUTO-ENTMCNC: 33 G/DL (ref 31.4–37.4)
MCV RBC AUTO: 88 FL (ref 82–98)
MONOCYTES # BLD AUTO: 0.79 THOUSAND/ÂΜL (ref 0.17–1.22)
MONOCYTES NFR BLD AUTO: 7 % (ref 4–12)
NEUTROPHILS # BLD AUTO: 7.9 THOUSANDS/ÂΜL (ref 1.85–7.62)
NEUTS SEG NFR BLD AUTO: 71 % (ref 43–75)
NITRITE UR QL STRIP: NEGATIVE
NRBC BLD AUTO-RTO: 0 /100 WBCS
PH UR STRIP.AUTO: 6.5 [PH]
PLATELET # BLD AUTO: 263 THOUSANDS/UL (ref 149–390)
PMV BLD AUTO: 10 FL (ref 8.9–12.7)
POTASSIUM SERPL-SCNC: 4.3 MMOL/L (ref 3.5–5.3)
PROT SERPL-MCNC: 7.4 G/DL (ref 6.4–8.4)
PROT UR STRIP-MCNC: NEGATIVE MG/DL
RBC # BLD AUTO: 4.66 MILLION/UL (ref 3.81–5.12)
SODIUM SERPL-SCNC: 135 MMOL/L (ref 135–147)
SP GR UR STRIP.AUTO: 1.01 (ref 1–1.03)
UROBILINOGEN UR STRIP-ACNC: <2 MG/DL
WBC # BLD AUTO: 10.94 THOUSAND/UL (ref 4.31–10.16)

## 2025-04-22 PROCEDURE — 96375 TX/PRO/DX INJ NEW DRUG ADDON: CPT

## 2025-04-22 PROCEDURE — 96361 HYDRATE IV INFUSION ADD-ON: CPT

## 2025-04-22 PROCEDURE — 85025 COMPLETE CBC W/AUTO DIFF WBC: CPT | Performed by: EMERGENCY MEDICINE

## 2025-04-22 PROCEDURE — 99284 EMERGENCY DEPT VISIT MOD MDM: CPT

## 2025-04-22 PROCEDURE — 99285 EMERGENCY DEPT VISIT HI MDM: CPT | Performed by: EMERGENCY MEDICINE

## 2025-04-22 PROCEDURE — 96374 THER/PROPH/DIAG INJ IV PUSH: CPT

## 2025-04-22 PROCEDURE — 74177 CT ABD & PELVIS W/CONTRAST: CPT

## 2025-04-22 PROCEDURE — 36415 COLL VENOUS BLD VENIPUNCTURE: CPT | Performed by: EMERGENCY MEDICINE

## 2025-04-22 PROCEDURE — 80053 COMPREHEN METABOLIC PANEL: CPT | Performed by: EMERGENCY MEDICINE

## 2025-04-22 PROCEDURE — 83690 ASSAY OF LIPASE: CPT | Performed by: EMERGENCY MEDICINE

## 2025-04-22 PROCEDURE — 81003 URINALYSIS AUTO W/O SCOPE: CPT | Performed by: EMERGENCY MEDICINE

## 2025-04-22 RX ORDER — ONDANSETRON 2 MG/ML
4 INJECTION INTRAMUSCULAR; INTRAVENOUS ONCE
Status: COMPLETED | OUTPATIENT
Start: 2025-04-22 | End: 2025-04-22

## 2025-04-22 RX ORDER — KETOROLAC TROMETHAMINE 30 MG/ML
15 INJECTION, SOLUTION INTRAMUSCULAR; INTRAVENOUS ONCE
Status: COMPLETED | OUTPATIENT
Start: 2025-04-22 | End: 2025-04-22

## 2025-04-22 RX ORDER — ACETAMINOPHEN 10 MG/ML
1000 INJECTION, SOLUTION INTRAVENOUS ONCE
Status: COMPLETED | OUTPATIENT
Start: 2025-04-22 | End: 2025-04-22

## 2025-04-22 RX ORDER — MORPHINE SULFATE 4 MG/ML
4 INJECTION, SOLUTION INTRAMUSCULAR; INTRAVENOUS ONCE
Status: COMPLETED | OUTPATIENT
Start: 2025-04-22 | End: 2025-04-22

## 2025-04-22 RX ORDER — ONDANSETRON 4 MG/1
4 TABLET, FILM COATED ORAL EVERY 6 HOURS PRN
Qty: 20 TABLET | Refills: 0 | Status: SHIPPED | OUTPATIENT
Start: 2025-04-22 | End: 2025-05-08

## 2025-04-22 RX ADMIN — IOHEXOL 100 ML: 350 INJECTION, SOLUTION INTRAVENOUS at 08:47

## 2025-04-22 RX ADMIN — ACETAMINOPHEN 1000 MG: 10 INJECTION INTRAVENOUS at 07:20

## 2025-04-22 RX ADMIN — SODIUM CHLORIDE 1000 ML: 0.9 INJECTION, SOLUTION INTRAVENOUS at 07:24

## 2025-04-22 RX ADMIN — ONDANSETRON 4 MG: 2 INJECTION INTRAMUSCULAR; INTRAVENOUS at 07:20

## 2025-04-22 RX ADMIN — MORPHINE SULFATE 4 MG: 4 INJECTION INTRAVENOUS at 08:33

## 2025-04-22 RX ADMIN — AMOXICILLIN AND CLAVULANATE POTASSIUM 1 TABLET: 875; 125 TABLET, FILM COATED ORAL at 09:47

## 2025-04-22 RX ADMIN — KETOROLAC TROMETHAMINE 15 MG: 30 INJECTION, SOLUTION INTRAMUSCULAR; INTRAVENOUS at 07:20

## 2025-04-22 NOTE — ED PROVIDER NOTES
Time reflects when diagnosis was documented in both MDM as applicable and the Disposition within this note       Time User Action Codes Description Comment    4/22/2025  9:18 AM Re Tian [K57.92] Diverticulitis     4/22/2025  9:18 AM Re Tian [R10.9] Abdominal pain     4/22/2025  9:18 AM Re Tian [R11.0] Nausea           ED Disposition       ED Disposition   Discharge    Condition   Stable    Date/Time   Tue Apr 22, 2025  9:18 AM    Comment   Lena Coates discharge to home/self care.                   Assessment & Plan       Medical Decision Making  Pt is a 35yo F who presents with abdominal pain.     Differential diagnosis to include but not limited to pyelonephritis, nephrolithiasis, gastritis, pancreatitis, splenic abnormality.  Will plan for labs, urinalysis, and imaging.  Will treat symptomatically and reassess.  See ED course for results and details.    Plan to discharge pt with f/u to PCP and GI. Discussed returning the ED with new or worsening of symptoms. Discussed use of over the counter medications as stated on the bottle as needed for pain. Discussed taking new medication as prescribed and to completion. Pt expressed understanding of discharge instructions, return precautions, and medication instructions and is stable for discharge at this time. All questions were answered and pt was discharged without incident.         Amount and/or Complexity of Data Reviewed  Labs: ordered. Decision-making details documented in ED Course.  Radiology: ordered. Decision-making details documented in ED Course.    Risk  Prescription drug management.        ED Course as of 04/22/25 0956   Tue Apr 22, 2025   0742 WBC(!): 10.94  Mildly elevated. Non-diagnostic.    0742 CBC and differential(!)  Reviewed and without actionable derangement.    0811 Comprehensive metabolic panel  Reviewed and without actionable derangement.    0811 LIPASE: 17  WNL   0843 Pt in CT.    0852 Leukocytes,  UA: Negative   0852 Nitrite, UA: Negative   0852 Blood, UA: Negative   0911 CT abdomen pelvis with contrast  Acute uncomplicated diverticulitis involving the mid descending colon and proximal sigmoid colon.       Medications   ketorolac (TORADOL) injection 15 mg (15 mg Intravenous Given 4/22/25 0720)   acetaminophen (Ofirmev) injection 1,000 mg (0 mg Intravenous Stopped 4/22/25 0735)   sodium chloride 0.9 % bolus 1,000 mL (0 mL Intravenous Stopped 4/22/25 0824)   ondansetron (ZOFRAN) injection 4 mg (4 mg Intravenous Given 4/22/25 0720)   morphine injection 4 mg (4 mg Intravenous Given 4/22/25 0833)   iohexol (OMNIPAQUE) 350 MG/ML injection (MULTI-DOSE) 100 mL (100 mL Intravenous Given 4/22/25 0847)   amoxicillin-clavulanate (AUGMENTIN) 875-125 mg per tablet 1 tablet (1 tablet Oral Given 4/22/25 0947)       ED Risk Strat Scores                    No data recorded        SBIRT 20yo+      Flowsheet Row Most Recent Value   Initial Alcohol Screen: US AUDIT-C     1. How often do you have a drink containing alcohol? 0 Filed at: 04/22/2025 0656   2. How many drinks containing alcohol do you have on a typical day you are drinking?  0 Filed at: 04/22/2025 0656   3a. Male UNDER 65: How often do you have five or more drinks on one occasion? 0 Filed at: 04/22/2025 0656   3b. FEMALE Any Age, or MALE 65+: How often do you have 4 or more drinks on one occassion? 0 Filed at: 04/22/2025 0656   Audit-C Score 0 Filed at: 04/22/2025 0656   FRANKY: How many times in the past year have you...    Used an illegal drug or used a prescription medication for non-medical reasons? Never Filed at: 04/22/2025 0656                            History of Present Illness       Chief Complaint   Patient presents with    Abdominal Pain     ULQ pain since yesterday with nausea, started radiating to the back today, hx of kidney infections        Past Medical History:   Diagnosis Date    Abnormal weight gain     Anemia 2017    during pregnancy     Candidiasis, mouth     Cellulitis of right arm     Kidney infection     Lyme disease     Pre-employment health screening examination 2018    SIRS (systemic inflammatory response syndrome) (HCC)     Tinea corporis       Past Surgical History:   Procedure Laterality Date    BREAST BIOPSY       SECTION      FOOT SURGERY      HYSTERECTOMY  2024    Uterus and cervix    TX CYSTOURETHROSCOPY N/A 2024    Procedure: CYSTOSCOPY;  Surgeon: Keyla Peter MD;  Location: [facility];  Service: Gynecology    TX LAPS TOTAL HYSTERECT 250 GM/< W/RMVL TUBE/OVARY Bilateral 2024    Procedure: HYSTERECTOMY LAPAROSCOPIC TOTAL (LTH) WITH RIGHT PARTIAL SALPINGECTOMY, LYSIS OF ADHESIONS, EXAM UNDER ANESTHESIA;  Surgeon: Keyla Peter MD;  Location: [facility];  Service: Gynecology    SALPINGECTOMY Bilateral     TUBAL LIGATION  2021    Tubes removed    US GUIDED BREAST BIOPSY RIGHT COMPLETE Right 2021      Family History   Problem Relation Age of Onset    Diabetes Mother     Atrial fibrillation Mother     No Known Problems Father     Diabetes Maternal Grandmother     Cancer Maternal Grandfather     Cancer Paternal Grandfather     Depression Half-Sister     Learning disabilities Half-Brother     Auditory processing disorder Half-Brother     Breast cancer Other       Social History     Tobacco Use    Smoking status: Never     Passive exposure: Never    Smokeless tobacco: Never   Vaping Use    Vaping status: Never Used   Substance Use Topics    Alcohol use: Not Currently     Comment: social    Drug use: No      E-Cigarette/Vaping    E-Cigarette Use Never User       E-Cigarette/Vaping Substances    Nicotine No     THC No     CBD No     Flavoring No     Other No     Unknown No       I have reviewed and agree with the history as documented.     Pt is a 35yo F who presents for abdominal pain.  Patient reports that yesterday she began with left upper quadrant abdominal pain.  Patient  reports since that time it has continued to worsen and now radiates to her left flank.  Patient reports the pain is severe.  Patient denies any inciting incident.  Patient states that she thought it was constipation and therefore took MiraLAX.  Patient had a bowel movement with no relief.  Patient also took Advil without relief.  Patient reports nausea that started this morning without vomiting.  Patient denies any urinary symptoms but does report history of pyelonephritis and nephrolithiasis.  Patient also reports family history of diverticulitis.  Patient denies any fevers or chills.  Patient has history of 2 C-sections and hysterectomy but denies any other previous abdominal surgeries.          Objective       ED Triage Vitals   Temperature Pulse Blood Pressure Respirations SpO2 Patient Position - Orthostatic VS   04/22/25 0654 04/22/25 0656 04/22/25 0656 04/22/25 0654 04/22/25 0656 --   97.9 °F (36.6 °C) (!) 111 131/70 18 99 %       Temp src Heart Rate Source BP Location FiO2 (%) Pain Score    -- -- -- -- 04/22/25 0654        9      Vitals      Date and Time Temp Pulse SpO2 Resp BP Pain Score FACES Pain Rating User   04/22/25 0852 -- -- -- -- -- 5 -- DQ   04/22/25 0833 -- -- -- -- -- 7 -- DQ   04/22/25 0656 -- 111 99 % -- 131/70 -- -- MAI   04/22/25 0654 97.9 °F (36.6 °C) -- -- 18 -- 9 -- MAI            Physical Exam  Vitals reviewed.   Constitutional:       Appearance: She is well-developed. She is not toxic-appearing or diaphoretic.      Comments: Uncomfortable appearing   HENT:      Head: Normocephalic and atraumatic.      Right Ear: External ear normal.      Left Ear: External ear normal.      Nose: Nose normal.      Mouth/Throat:      Pharynx: Oropharynx is clear.   Eyes:      Extraocular Movements: Extraocular movements intact.      Pupils: Pupils are equal, round, and reactive to light.   Cardiovascular:      Rate and Rhythm: Regular rhythm. Tachycardia present.      Heart sounds: Normal heart sounds.    Pulmonary:      Effort: Pulmonary effort is normal. No respiratory distress.      Breath sounds: Normal breath sounds.   Abdominal:      General: There is no distension.      Palpations: Abdomen is soft.      Tenderness: There is abdominal tenderness (L sided). There is left CVA tenderness. There is no right CVA tenderness, guarding or rebound.   Musculoskeletal:         General: Normal range of motion.      Cervical back: Normal range of motion and neck supple.   Skin:     General: Skin is warm and dry.      Capillary Refill: Capillary refill takes less than 2 seconds.   Neurological:      General: No focal deficit present.      Mental Status: She is alert and oriented to person, place, and time.   Psychiatric:         Speech: Speech normal.         Behavior: Behavior is cooperative.         Results Reviewed       Procedure Component Value Units Date/Time    UA (URINE) with reflex to Scope [[id number]] Collected: 04/22/25 0841    Lab Status: Final result Specimen: Urine, Other Updated: 04/22/25 0852     Color, UA Colorless     Clarity, UA Clear     Specific Gravity, UA 1.010     pH, UA 6.5     Leukocytes, UA Negative     Nitrite, UA Negative     Protein, UA Negative mg/dl      Glucose, UA Negative mg/dl      Ketones, UA Negative mg/dl      Urobilinogen, UA <2.0 mg/dl      Bilirubin, UA Negative     Occult Blood, UA Negative    Comprehensive metabolic panel [[id number]] Collected: 04/22/25 0725    Lab Status: Final result Specimen: Blood from Line, Venous Updated: 04/22/25 0805     Sodium 135 mmol/L      Potassium 4.3 mmol/L      Chloride 102 mmol/L      CO2 23 mmol/L      ANION GAP 10 mmol/L      BUN 15 mg/dL      Creatinine 0.74 mg/dL      Glucose 124 mg/dL      Calcium 9.2 mg/dL      AST 16 U/L      ALT 16 U/L      Alkaline Phosphatase 43 U/L      Total Protein 7.4 g/dL      Albumin 4.2 g/dL      Total Bilirubin 0.42 mg/dL      eGFR 106 ml/min/1.73sq m     Narrative:      [employer]  guidelines for Chronic Kidney Disease (CKD):     Stage 1 with normal or high GFR (GFR > 90 mL/min/1.73 square meters)    Stage 2 Mild CKD (GFR = 60-89 mL/min/1.73 square meters)    Stage 3A Moderate CKD (GFR = 45-59 mL/min/1.73 square meters)    Stage 3B Moderate CKD (GFR = 30-44 mL/min/1.73 square meters)    Stage 4 Severe CKD (GFR = 15-29 mL/min/1.73 square meters)    Stage 5 End Stage CKD (GFR <15 mL/min/1.73 square meters)  Note: GFR calculation is accurate only with a steady state creatinine    Lipase [[id number]]  (Normal) Collected: 04/22/25 0725    Lab Status: Final result Specimen: Blood from Line, Venous Updated: 04/22/25 0805     Lipase 17 u/L     CBC and differential [[id number]]  (Abnormal) Collected: 04/22/25 0725    Lab Status: Final result Specimen: Blood from Line, Venous Updated: 04/22/25 0741     WBC 10.94 Thousand/uL      RBC 4.66 Million/uL      Hemoglobin 13.6 g/dL      Hematocrit 41.2 %      MCV 88 fL      MCH 29.2 pg      MCHC 33.0 g/dL      RDW 13.3 %      MPV 10.0 fL      Platelets 263 Thousands/uL      nRBC 0 /100 WBCs      Segmented % 71 %      Immature Grans % 1 %      Lymphocytes % 19 %      Monocytes % 7 %      Eosinophils Relative 1 %      Basophils Relative 1 %      Absolute Neutrophils 7.90 Thousands/µL      Absolute Immature Grans 0.07 Thousand/uL      Absolute Lymphocytes 2.03 Thousands/µL      Absolute Monocytes 0.79 Thousand/µL      Eosinophils Absolute 0.10 Thousand/µL      Basophils Absolute 0.05 Thousands/µL             CT abdomen pelvis with contrast   Final Interpretation by Ector Carlton MD (04/22 0905)      Acute uncomplicated diverticulitis involving the mid descending colon and proximal sigmoid colon.      Cholelithiasis.      Tiny left renal calculus.      The study was marked in EPIC for immediate notification.         Workstation performed: [id number]             Procedures    ED Medication and Procedure Management   None     Discharge Medication List as of  4/22/2025  9:20 AM        START taking these medications    Details   amoxicillin-clavulanate (AUGMENTIN) 875-125 mg per tablet Take 1 tablet by mouth every 8 (eight) hours for 10 days, Starting Tue 4/22/2025, Until Fri 5/2/2025, Normal      ondansetron (ZOFRAN) 4 mg tablet Take 1 tablet (4 mg total) by mouth every 6 (six) hours as needed for nausea or vomiting, Starting Tue 4/22/2025, Normal           No discharge procedures on file.  ED SEPSIS DOCUMENTATION   Time reflects when diagnosis was documented in both MDM as applicable and the Disposition within this note       Time User Action Codes Description Comment    4/22/2025  9:18 AM Re Tian [K57.92] Diverticulitis     4/22/2025  9:18 AM Re Tian [R10.9] Abdominal pain     4/22/2025  9:18 AM Re Tian [R11.0] Nausea                  Re Tian MD  04/22/25 0914

## 2025-04-22 NOTE — DISCHARGE INSTRUCTIONS
Follow-up with primary care and GI for further care. Contact info provided below if needed.  Use over the counter medications as stated on the bottle as needed for pain control.  Take your new medications as prescribed and to completion.  Return to the ED with new or worsening symptoms.

## 2025-05-01 ENCOUNTER — OFFICE VISIT (OUTPATIENT)
Age: 35
End: 2025-05-01
Payer: COMMERCIAL

## 2025-05-01 VITALS
WEIGHT: 222.8 LBS | DIASTOLIC BLOOD PRESSURE: 94 MMHG | HEIGHT: 62 IN | SYSTOLIC BLOOD PRESSURE: 114 MMHG | TEMPERATURE: 81 F | BODY MASS INDEX: 41 KG/M2

## 2025-05-01 DIAGNOSIS — K57.90 DIVERTICULAR DISEASE: ICD-10-CM

## 2025-05-01 DIAGNOSIS — K80.20 GALLSTONES: ICD-10-CM

## 2025-05-01 DIAGNOSIS — K57.32 DIVERTICULITIS OF LARGE INTESTINE WITHOUT PERFORATION OR ABSCESS WITHOUT BLEEDING: Primary | ICD-10-CM

## 2025-05-01 DIAGNOSIS — R93.5 ABNORMAL CT OF THE ABDOMEN: ICD-10-CM

## 2025-05-01 PROCEDURE — 99204 OFFICE O/P NEW MOD 45 MIN: CPT | Performed by: INTERNAL MEDICINE

## 2025-05-01 RX ORDER — SODIUM CHLORIDE, SODIUM LACTATE, POTASSIUM CHLORIDE, CALCIUM CHLORIDE 600; 310; 30; 20 MG/100ML; MG/100ML; MG/100ML; MG/100ML
125 INJECTION, SOLUTION INTRAVENOUS CONTINUOUS
OUTPATIENT
Start: 2025-05-01

## 2025-05-01 RX ORDER — POLYETHYLENE GLYCOL 3350, SODIUM SULFATE ANHYDROUS, SODIUM BICARBONATE, SODIUM CHLORIDE, POTASSIUM CHLORIDE 236; 22.74; 6.74; 5.86; 2.97 G/4L; G/4L; G/4L; G/4L; G/4L
4 POWDER, FOR SOLUTION ORAL ONCE
Qty: 4000 ML | Refills: 0 | Status: SHIPPED | OUTPATIENT
Start: 2025-05-01 | End: 2025-05-01

## 2025-05-01 NOTE — PATIENT INSTRUCTIONS
Scheduled date of colonoscopy (as of today): June 16, 2025  Physician performing colonoscopy: Dr. Corral  Location of colonoscopy: [facility]  Bowel prep reviewed with patient: Golytely  Instructions reviewed with patient by: AIDAN  Clearances: NA

## 2025-05-01 NOTE — PROGRESS NOTES
Name: Lena Coates      : 1990      MRN: [id number]  Encounter Provider: Carlos Corral MD  Encounter Date: 2025   Encounter department: [facility]  :  Assessment & Plan  Diverticulitis of large intestine without perforation or abscess without bleeding  Recent attack of lower abdominal pain left side with documented diverticulitis of mid to distal descending and proximal sigmoid.  Doing well on Augmentin.  Has strong family history of diverticulitis.  Complications discussed, diet reviewed, white count, take fiber supplement.  Try to lose weight.  Will plan colonoscopy for further evaluation and direct visualization to ensure there is no other pathology.  Agrees prep reviewed, she will sign consent  Orders:  •  Colonoscopy; Future    Diverticular disease         Gallstones  Asymptomatic, patient aware.       BMI 40.0-44.9, adult (HCC)         Abnormal CT of the abdomen    Orders:  •  Colonoscopy; Future  •  polyethylene glycol (Golytely) 4000 mL solution; Take 4,000 mL by mouth once for 1 dose Take according to instructions given by the office for colonoscopy bowel prep.        History of Present Illness   HPI  Lena Coates is a 34 y.o. female who presents with diagnosis of diverticulitis.  She developed severe pain left lower quadrant 8 out of 10, went to the ED, CT imaging done suggested diverticulitis of mid distal descending and proximal sigmoid.  Good given Augmentin, pain got better within 2 days.  She did not have any fever or chills, did have some nausea with the pain.  Denies any history of dysuria or hematuria, though does give history of kidney stones.  Has had an hysterectomy done after 2 childbirths for some kind of adenomyomatosis.  Patient has had some constipation.  She tells me was on high-protein diet and was not consuming enough fiber so became constipated she believes that may have triggered this attack.  No documented diverticulitis in  "the past.  Bowels generally regular.  No apparent blood in stools.  Denies any upper GI symptoms.  Trying to be reasonably active and lose weight.  Works in school as IT guide.  Lives with her  who is a sports medicine trainer for [employer] at [email or web address].  No history of chest pain shortness of breath      Review of Systems   Constitutional:  Negative for fever.   HENT:  Negative for trouble swallowing.    Respiratory:  Negative for cough, chest tightness and shortness of breath.    Cardiovascular:  Negative for chest pain.   Gastrointestinal:  Negative for abdominal distention, abdominal pain, anal bleeding, blood in stool, constipation, diarrhea, nausea, rectal pain and vomiting.   Genitourinary:  Negative for dysuria and hematuria.   Skin:  Negative for rash.   Neurological:  Negative for seizures.          Objective   /94 (Patient Position: Standing, Cuff Size: Standard)   Temp (!) 81 °F (27.2 °C)   Ht 5' 2\" (1.575 m)   Wt 101 kg (222 lb 12.8 oz)   LMP 11/15/2024 (Approximate)   BMI 40.75 kg/m²      Physical Exam  Constitutional:       General: She is not in acute distress.     Appearance: She is normal weight. She is not ill-appearing.   HENT:      Mouth/Throat:      Mouth: Mucous membranes are moist.   Cardiovascular:      Rate and Rhythm: Normal rate.   Pulmonary:      Effort: No respiratory distress.      Breath sounds: No stridor. No wheezing or rales.   Abdominal:      General: Bowel sounds are normal. There is no distension.      Palpations: There is no mass.      Tenderness: There is no abdominal tenderness. There is no guarding or rebound.      Hernia: No hernia is present.   Skin:     Coloration: Skin is not jaundiced or pale.   Neurological:      Mental Status: She is oriented to person, place, and time.           "

## 2025-05-06 ENCOUNTER — NURSE TRIAGE (OUTPATIENT)
Age: 35
End: 2025-05-06

## 2025-05-06 ENCOUNTER — OFFICE VISIT (OUTPATIENT)
Dept: FAMILY MEDICINE CLINIC | Facility: CLINIC | Age: 35
End: 2025-05-06
Payer: COMMERCIAL

## 2025-05-06 VITALS
HEIGHT: 62 IN | WEIGHT: 223 LBS | OXYGEN SATURATION: 98 % | RESPIRATION RATE: 16 BRPM | BODY MASS INDEX: 41.04 KG/M2 | SYSTOLIC BLOOD PRESSURE: 130 MMHG | DIASTOLIC BLOOD PRESSURE: 80 MMHG | TEMPERATURE: 98.2 F | HEART RATE: 88 BPM

## 2025-05-06 DIAGNOSIS — G43.019 INTRACTABLE MIGRAINE WITHOUT AURA AND WITHOUT STATUS MIGRAINOSUS: Primary | ICD-10-CM

## 2025-05-06 DIAGNOSIS — K57.30 DIVERTICULOSIS OF COLON: ICD-10-CM

## 2025-05-06 PROCEDURE — 99213 OFFICE O/P EST LOW 20 MIN: CPT | Performed by: FAMILY MEDICINE

## 2025-05-06 PROCEDURE — 96372 THER/PROPH/DIAG INJ SC/IM: CPT

## 2025-05-06 RX ORDER — SUMATRIPTAN SUCCINATE 100 MG/1
100 TABLET ORAL ONCE AS NEEDED
Qty: 10 TABLET | Refills: 5 | Status: SHIPPED | OUTPATIENT
Start: 2025-05-06

## 2025-05-06 RX ORDER — KETOROLAC TROMETHAMINE 30 MG/ML
60 INJECTION, SOLUTION INTRAMUSCULAR; INTRAVENOUS ONCE
Status: DISCONTINUED | OUTPATIENT
Start: 2025-05-06 | End: 2025-05-06

## 2025-05-06 RX ADMIN — KETOROLAC TROMETHAMINE 60 MG: 30 INJECTION, SOLUTION INTRAMUSCULAR; INTRAVENOUS at 15:41

## 2025-05-06 NOTE — ASSESSMENT & PLAN NOTE
New flare  Recommended over-the-counter Benadryl tonight to help with headache and sleep  Orders:  •  ketorolac (TORADOL) 60 mg/2 mL IM injection 60 mg  •  SUMAtriptan (IMITREX) 100 mg tablet; Take 1 tablet (100 mg total) by mouth once as needed for migraine (may repeat in 4 hours, max 200 mg in 24 hours) may repeat in 2 hours if necessary. Max dose 200mg in 24 hour period.

## 2025-05-06 NOTE — TELEPHONE ENCOUNTER
"Pt's MYC message as follows:    \"Hi Dr. Baker,     So you may or may not know but I was in the ER on April 22 and was diagnosed with diverticulitis. They put me on augmentin three times a day which I finished this past Saturday.      Since this past Thursday though, I have had this ongoing migraine on the left side of my head. Sunday night was the worse with nausea and high sensitivity to sound and light. This morning was the exact same but the nausea is much more intense. Advil helps but only takes the edge off.      Could this be because of the Augmentin? It has happened twice before and I believe I was on medication then too. Its honestly feels like someone is taking a screwdriver on the side of my head behind my head.      I was also looking up all of my symptoms with this migraine and it said something status migrainosus. Could it be this as well. I feel lost. \"    FOLLOW UP: Discuss with provider and call back    REASON FOR CONVERSATION: Migraine    SYMPTOMS: 5/10 migraine HA that increases to a 8/10 at times since 05/01/2025.  Left side of head with occasional left eye blurriness. Nausea.      OTHER: H/o migraines with visual changes.  Using Zofran that was prescribed for her diverticulitis.  Advil takes the edge off headache pain. Appts offered today with Litzy or Fransisca.  Pt would like to see only Dr. Baker.  Can she be put on the schedule this week?     DISPOSITION: Discuss With PCP and Callback by Nurse Today (overriding See Today or Tomorrow in Office)      Reason for Disposition   MODERATE headache (e.g., interferes with normal activities) present > 24 hours and unexplained    Answer Assessment - Initial Assessment Questions  1. LOCATION: \"Where does it hurt?\"       Left side of head   2. ONSET: \"When did the headache start?\" (e.g., minutes, hours, days)       05/01/2025  3. PATTERN: \"Does the pain come and go, or has it been constant since it started?\"      Constant   4. SEVERITY: \"How bad is the " "pain?\" and \"What does it keep you from doing?\"  (e.g., Scale 1-10; mild, moderate, or severe)      Moderate to severe.  5/10  5. RECURRENT SYMPTOM: \"Have you ever had headaches before?\" If Yes, ask: \"When was the last time?\" and \"What happened that time?\"       Yes   6. CAUSE: \"What do you think is causing the headache?\"      I think it might of been abx that I was given for diverticulitis   7. MIGRAINE: \"Have you been diagnosed with migraine headaches?\" If Yes, ask: \"Is this headache similar?\"       Yes   8. HEAD INJURY: \"Has there been any recent injury to the head?\"       no  9. OTHER SYMPTOMS: \"Do you have any other symptoms?\" (e.g., fever, stiff neck, eye pain, sore throat, cold symptoms)      no  10. PREGNANCY: \"Is there any chance you are pregnant?\" \"When was your last menstrual period?\"        no    Protocols used: Headache-Adult-[place]    "

## 2025-05-06 NOTE — PROGRESS NOTES
"Name: Lena Coates      : 1990      MRN: [id number]  Encounter Provider: Leigh Baker DO  Encounter Date: 2025   Encounter department: [facility]  :  Assessment & Plan  Diverticulosis of colon  Had diverticulitis  Treated with augmentin  Reviewed CT scan abd/pelvis done on 25       Intractable migraine without aura and without status migrainosus  New flare  Recommended over-the-counter Benadryl tonight to help with headache and sleep  Orders:  •  ketorolac (TORADOL) 60 mg/2 mL IM injection 60 mg  •  SUMAtriptan (IMITREX) 100 mg tablet; Take 1 tablet (100 mg total) by mouth once as needed for migraine (may repeat in 4 hours, max 200 mg in 24 hours) may repeat in 2 hours if necessary. Max dose 200mg in 24 hour period.      Assessment & Plan      Return if symptoms worsen or fail to improve.     History of Present Illness   She was seen in emergency room on  with abdominal pain.  She was treated with Augmentin.  She started with migraine last week.  She has been taking advil  She has been nausea and has needed zofran.     She has had migraines like this previously, but it has been years.  They have occurred during times of stress    The pain is on the left side of her head.  Advil will temporarily help but does not relieve the pain completely.      Review of Systems   Gastrointestinal:  Positive for nausea.   Neurological:  Positive for headaches.       Objective   /80   Pulse 88   Temp 98.2 °F (36.8 °C)   Resp 16   Ht 5' 2\" (1.575 m)   Wt 101 kg (223 lb)   LMP 11/15/2024 (Approximate)   SpO2 98%   BMI 40.79 kg/m²      Physical Exam  Vitals and nursing note reviewed.   Constitutional:       General: She is not in acute distress.     Appearance: She is well-developed.   HENT:      Head: Normocephalic and atraumatic.      Right Ear: Tympanic membrane normal.      Left Ear: Tympanic membrane normal.   Cardiovascular:      Rate and Rhythm: Normal rate and regular " rhythm.      Heart sounds: No murmur heard.  Pulmonary:      Effort: Pulmonary effort is normal. No respiratory distress.      Breath sounds: Normal breath sounds.   Musculoskeletal:      Cervical back: Neck supple.   Neurological:      Mental Status: She is alert.

## 2025-05-07 ENCOUNTER — TELEPHONE (OUTPATIENT)
Age: 35
End: 2025-05-07

## 2025-05-07 ENCOUNTER — APPOINTMENT (OUTPATIENT)
Dept: RADIOLOGY | Facility: HOSPITAL | Age: 35
End: 2025-05-07
Payer: COMMERCIAL

## 2025-05-07 ENCOUNTER — APPOINTMENT (EMERGENCY)
Dept: NON INVASIVE DIAGNOSTICS | Facility: HOSPITAL | Age: 35
End: 2025-05-07
Attending: EMERGENCY MEDICINE
Payer: COMMERCIAL

## 2025-05-07 ENCOUNTER — HOSPITAL ENCOUNTER (OUTPATIENT)
Facility: HOSPITAL | Age: 35
Setting detail: OBSERVATION
Discharge: HOME | End: 2025-05-08
Attending: EMERGENCY MEDICINE | Admitting: FAMILY MEDICINE
Payer: COMMERCIAL

## 2025-05-07 ENCOUNTER — APPOINTMENT (EMERGENCY)
Dept: RADIOLOGY | Facility: HOSPITAL | Age: 35
End: 2025-05-07
Payer: COMMERCIAL

## 2025-05-07 DIAGNOSIS — G93.2 INTRACRANIAL HYPERTENSION: ICD-10-CM

## 2025-05-07 DIAGNOSIS — G43.019 INTRACTABLE MIGRAINE WITHOUT AURA AND WITHOUT STATUS MIGRAINOSUS: ICD-10-CM

## 2025-05-07 DIAGNOSIS — G43.909 MIGRAINE HEADACHE: Primary | ICD-10-CM

## 2025-05-07 PROBLEM — E66.813 CLASS 3 SEVERE OBESITY WITHOUT SERIOUS COMORBIDITY WITH BODY MASS INDEX (BMI) OF 40.0 TO 44.9 IN ADULT: Status: ACTIVE | Noted: 2018-07-23

## 2025-05-07 LAB
ALBUMIN SERPL BCG-MCNC: 4.1 G/DL (ref 3.5–5)
ALP SERPL-CCNC: 49 U/L (ref 34–104)
ALT SERPL W P-5'-P-CCNC: 24 U/L (ref 7–52)
ANION GAP SERPL CALCULATED.3IONS-SCNC: 10 MMOL/L (ref 4–13)
APPEARANCE CSF: CLEAR
APTT PPP: 27 SECONDS (ref 23–34)
AST SERPL W P-5'-P-CCNC: 18 U/L (ref 13–39)
BASOPHILS # BLD AUTO: 0.05 THOUSANDS/ÂΜL (ref 0–0.1)
BASOPHILS NFR BLD AUTO: 1 % (ref 0–1)
BILIRUB SERPL-MCNC: 0.4 MG/DL (ref 0.2–1)
BUN SERPL-MCNC: 17 MG/DL (ref 5–25)
CALCIUM SERPL-MCNC: 9.6 MG/DL (ref 8.4–10.2)
CHLORIDE SERPL-SCNC: 103 MMOL/L (ref 96–108)
CO2 SERPL-SCNC: 23 MMOL/L (ref 21–32)
CREAT SERPL-MCNC: 0.7 MG/DL (ref 0.6–1.3)
EOSINOPHIL # BLD AUTO: 0.16 THOUSAND/ÂΜL (ref 0–0.61)
EOSINOPHIL NFR BLD AUTO: 2 % (ref 0–6)
ERYTHROCYTE [DISTWIDTH] IN BLOOD BY AUTOMATED COUNT: 13.1 % (ref 11.6–15.1)
GFR SERPL CREATININE-BSD FRML MDRD: 113 ML/MIN/1.73SQ M
GLUCOSE CSF-MCNC: 52 MG/DL (ref 40–70)
GLUCOSE SERPL-MCNC: 85 MG/DL (ref 65–140)
GRAM STN SPEC: NORMAL
HCT VFR BLD AUTO: 44.9 % (ref 34.8–46.1)
HGB BLD-MCNC: 14.7 G/DL (ref 11.5–15.4)
IMM GRANULOCYTES # BLD AUTO: 0.02 THOUSAND/UL (ref 0–0.2)
IMM GRANULOCYTES NFR BLD AUTO: 0 % (ref 0–2)
INR PPP: 0.99 (ref 0.85–1.19)
LYMPHOCYTES # BLD AUTO: 3.73 THOUSANDS/ÂΜL (ref 0.6–4.47)
LYMPHOCYTES NFR BLD AUTO: 45 % (ref 14–44)
MCH RBC QN AUTO: 28.9 PG (ref 26.8–34.3)
MCHC RBC AUTO-ENTMCNC: 32.7 G/DL (ref 31.4–37.4)
MCV RBC AUTO: 88 FL (ref 82–98)
MONOCYTES # BLD AUTO: 0.36 THOUSAND/ÂΜL (ref 0.17–1.22)
MONOCYTES NFR BLD AUTO: 4 % (ref 4–12)
NEUTROPHILS # BLD AUTO: 4.06 THOUSANDS/ÂΜL (ref 1.85–7.62)
NEUTS SEG NFR BLD AUTO: 48 % (ref 43–75)
NRBC BLD AUTO-RTO: 0 /100 WBCS
PLATELET # BLD AUTO: 346 THOUSANDS/UL (ref 149–390)
PMV BLD AUTO: 10.2 FL (ref 8.9–12.7)
POTASSIUM SERPL-SCNC: 3.9 MMOL/L (ref 3.5–5.3)
PROT CSF-MCNC: 29 MG/DL (ref 15–45)
PROT SERPL-MCNC: 7.5 G/DL (ref 6.4–8.4)
PROTHROMBIN TIME: 13.6 SECONDS (ref 12.3–15)
RBC # BLD AUTO: 5.08 MILLION/UL (ref 3.81–5.12)
SODIUM SERPL-SCNC: 136 MMOL/L (ref 135–147)
TOTAL CELLS COUNTED BLD: NO
TUBE # CSF: 4
WBC # BLD AUTO: 8.38 THOUSAND/UL (ref 4.31–10.16)
WBC # CSF AUTO: 0 /UL (ref 0–5)

## 2025-05-07 PROCEDURE — 87070 CULTURE OTHR SPECIMN AEROBIC: CPT | Performed by: EMERGENCY MEDICINE

## 2025-05-07 PROCEDURE — 36415 COLL VENOUS BLD VENIPUNCTURE: CPT | Performed by: EMERGENCY MEDICINE

## 2025-05-07 PROCEDURE — 93005 ELECTROCARDIOGRAM TRACING: CPT

## 2025-05-07 PROCEDURE — 62328 DX LMBR SPI PNXR W/FLUOR/CT: CPT | Performed by: RADIOLOGY

## 2025-05-07 PROCEDURE — 85025 COMPLETE CBC W/AUTO DIFF WBC: CPT | Performed by: EMERGENCY MEDICINE

## 2025-05-07 PROCEDURE — 84157 ASSAY OF PROTEIN OTHER: CPT | Performed by: EMERGENCY MEDICINE

## 2025-05-07 PROCEDURE — 62328 DX LMBR SPI PNXR W/FLUOR/CT: CPT

## 2025-05-07 PROCEDURE — 96375 TX/PRO/DX INJ NEW DRUG ADDON: CPT

## 2025-05-07 PROCEDURE — 99222 1ST HOSP IP/OBS MODERATE 55: CPT | Performed by: INTERNAL MEDICINE

## 2025-05-07 PROCEDURE — 96365 THER/PROPH/DIAG IV INF INIT: CPT

## 2025-05-07 PROCEDURE — 85610 PROTHROMBIN TIME: CPT | Performed by: EMERGENCY MEDICINE

## 2025-05-07 PROCEDURE — 70450 CT HEAD/BRAIN W/O DYE: CPT

## 2025-05-07 PROCEDURE — 96366 THER/PROPH/DIAG IV INF ADDON: CPT

## 2025-05-07 PROCEDURE — 99285 EMERGENCY DEPT VISIT HI MDM: CPT | Performed by: EMERGENCY MEDICINE

## 2025-05-07 PROCEDURE — 77003 FLUOROGUIDE FOR SPINE INJECT: CPT

## 2025-05-07 PROCEDURE — 82945 GLUCOSE OTHER FLUID: CPT | Performed by: EMERGENCY MEDICINE

## 2025-05-07 PROCEDURE — 89051 BODY FLUID CELL COUNT: CPT | Performed by: EMERGENCY MEDICINE

## 2025-05-07 PROCEDURE — 70551 MRI BRAIN STEM W/O DYE: CPT

## 2025-05-07 PROCEDURE — 99284 EMERGENCY DEPT VISIT MOD MDM: CPT

## 2025-05-07 PROCEDURE — 80053 COMPREHEN METABOLIC PANEL: CPT | Performed by: EMERGENCY MEDICINE

## 2025-05-07 PROCEDURE — 62270 DX LMBR SPI PNXR: CPT

## 2025-05-07 PROCEDURE — 85730 THROMBOPLASTIN TIME PARTIAL: CPT | Performed by: EMERGENCY MEDICINE

## 2025-05-07 RX ORDER — SUMATRIPTAN 6 MG/.5ML
6 INJECTION, SOLUTION SUBCUTANEOUS
Status: DISCONTINUED | OUTPATIENT
Start: 2025-05-07 | End: 2025-05-08 | Stop reason: HOSPADM

## 2025-05-07 RX ORDER — DIPHENHYDRAMINE HYDROCHLORIDE 50 MG/ML
25 INJECTION, SOLUTION INTRAMUSCULAR; INTRAVENOUS ONCE
Status: COMPLETED | OUTPATIENT
Start: 2025-05-07 | End: 2025-05-07

## 2025-05-07 RX ORDER — MAGNESIUM SULFATE HEPTAHYDRATE 40 MG/ML
2 INJECTION, SOLUTION INTRAVENOUS EVERY 24 HOURS
Status: DISCONTINUED | OUTPATIENT
Start: 2025-05-07 | End: 2025-05-08 | Stop reason: HOSPADM

## 2025-05-07 RX ORDER — METOCLOPRAMIDE HYDROCHLORIDE 5 MG/ML
10 INJECTION INTRAMUSCULAR; INTRAVENOUS ONCE
Status: COMPLETED | OUTPATIENT
Start: 2025-05-07 | End: 2025-05-07

## 2025-05-07 RX ORDER — ACETAZOLAMIDE 250 MG/1
500 TABLET ORAL EVERY 12 HOURS SCHEDULED
Status: DISCONTINUED | OUTPATIENT
Start: 2025-05-07 | End: 2025-05-08 | Stop reason: HOSPADM

## 2025-05-07 RX ORDER — KETOROLAC TROMETHAMINE 30 MG/ML
15 INJECTION, SOLUTION INTRAMUSCULAR; INTRAVENOUS ONCE
Status: COMPLETED | OUTPATIENT
Start: 2025-05-07 | End: 2025-05-07

## 2025-05-07 RX ORDER — ACETAMINOPHEN 325 MG/1
650 TABLET ORAL EVERY 6 HOURS PRN
Status: DISCONTINUED | OUTPATIENT
Start: 2025-05-07 | End: 2025-05-08 | Stop reason: HOSPADM

## 2025-05-07 RX ORDER — KETOROLAC TROMETHAMINE 30 MG/ML
30 INJECTION, SOLUTION INTRAMUSCULAR; INTRAVENOUS EVERY 8 HOURS SCHEDULED
Status: DISCONTINUED | OUTPATIENT
Start: 2025-05-07 | End: 2025-05-08 | Stop reason: HOSPADM

## 2025-05-07 RX ORDER — MAGNESIUM SULFATE 1 G/100ML
1 INJECTION INTRAVENOUS ONCE
Status: COMPLETED | OUTPATIENT
Start: 2025-05-07 | End: 2025-05-07

## 2025-05-07 RX ORDER — LIDOCAINE WITH 8.4% SOD BICARB 0.9%(10ML)
SYRINGE (ML) INJECTION AS NEEDED
Status: COMPLETED | OUTPATIENT
Start: 2025-05-07 | End: 2025-05-07

## 2025-05-07 RX ORDER — DIPHENHYDRAMINE HYDROCHLORIDE 50 MG/ML
25 INJECTION, SOLUTION INTRAMUSCULAR; INTRAVENOUS EVERY 8 HOURS SCHEDULED
Status: DISCONTINUED | OUTPATIENT
Start: 2025-05-07 | End: 2025-05-08 | Stop reason: HOSPADM

## 2025-05-07 RX ORDER — METOCLOPRAMIDE HYDROCHLORIDE 5 MG/ML
10 INJECTION INTRAMUSCULAR; INTRAVENOUS EVERY 8 HOURS SCHEDULED
Status: DISCONTINUED | OUTPATIENT
Start: 2025-05-07 | End: 2025-05-08 | Stop reason: HOSPADM

## 2025-05-07 RX ADMIN — Medication 4 ML: at 15:14

## 2025-05-07 RX ADMIN — ACETAZOLAMIDE 500 MG: 250 TABLET ORAL at 20:59

## 2025-05-07 RX ADMIN — METOCLOPRAMIDE 10 MG: 5 INJECTION, SOLUTION INTRAMUSCULAR; INTRAVENOUS at 17:14

## 2025-05-07 RX ADMIN — MAGNESIUM SULFATE HEPTAHYDRATE 2 G: 40 INJECTION, SOLUTION INTRAVENOUS at 17:14

## 2025-05-07 RX ADMIN — KETOROLAC TROMETHAMINE 15 MG: 30 INJECTION, SOLUTION INTRAMUSCULAR; INTRAVENOUS at 11:56

## 2025-05-07 RX ADMIN — KETOROLAC TROMETHAMINE 30 MG: 30 INJECTION, SOLUTION INTRAMUSCULAR; INTRAVENOUS at 17:13

## 2025-05-07 RX ADMIN — DIPHENHYDRAMINE HYDROCHLORIDE 25 MG: 50 INJECTION, SOLUTION INTRAMUSCULAR; INTRAVENOUS at 11:58

## 2025-05-07 RX ADMIN — SUMATRIPTAN 6 MG: 6 INJECTION, SOLUTION SUBCUTANEOUS at 20:59

## 2025-05-07 RX ADMIN — METOCLOPRAMIDE 10 MG: 5 INJECTION, SOLUTION INTRAMUSCULAR; INTRAVENOUS at 12:01

## 2025-05-07 RX ADMIN — SODIUM CHLORIDE 1000 ML: 0.9 INJECTION, SOLUTION INTRAVENOUS at 11:55

## 2025-05-07 RX ADMIN — DIPHENHYDRAMINE HYDROCHLORIDE 25 MG: 50 INJECTION, SOLUTION INTRAMUSCULAR; INTRAVENOUS at 17:14

## 2025-05-07 RX ADMIN — MAGNESIUM SULFATE HEPTAHYDRATE 1 G: 1 INJECTION, SOLUTION INTRAVENOUS at 12:04

## 2025-05-07 NOTE — H&P
H&P - Hospitalist   Name: Lena Coates 34 y.o. female I MRN: [id number]  Unit/Bed#: ED 05 I Date of Admission: 5/7/2025   Date of Service: 5/7/2025 I Hospital Day: 0     Assessment & Plan  Intractable migraine without aura and without status migrainosus  Patient presents to the ED for migraine over the last 2 days with associated visual disturbance. Denies any dizziness, fevers, chills, weakness, numbness/tingling.   S/p migraine cocktail in the ED with improvement of migraine to 4/10  CT head: No acute intracranial abnormality. Expanded partially empty sella as well as dilated optic nerve sheaths would support a clinical diagnosis of idiopathic intracranial hypotension in the setting of headaches.  Continue migraine cocktail medications Q8H, dim lights, close door to limit harsh sounds  Obtain MRI brain  Neuro checks  Consult to Neurology, recommendations are appreciated  Intracranial hypertension  As evidenced on CT head  S/p LP per IR with opening pressure 23.9. Follow up fluid studies  ED discussed case with Neurology. Recommending Diamox 500mg BID and MRI brain  See treatment above under migraine  Class 3 severe obesity without serious comorbidity with body mass index (BMI) of 40.0 to 44.9 in adult  Current BMI 40.79 kg/m2  Encouraged health diet and lifestyle modifications  Anxiety  Mood stable. Not currently on medication at home.      VTE Pharmacologic Prophylaxis: VTE Score: 1 Low Risk (Score 0-2) - Encourage Ambulation.  Code Status: Level 1 - Full Code   Discussion with family: Patient declined call to .     Anticipated Length of Stay: Patient will be admitted on an observation basis with an anticipated length of stay of less than 2 midnights secondary to migraine, intracranial hypertension.    History of Present Illness   Chief Complaint: Headache    Lena Coates is a 34 y.o. female with a PMH of anxiety, obesity, migraines.Patient presents to the ED for migraine over the last 2  days with associated visual disturbance. Denies any dizziness, fevers, chills, weakness, numbness/tingling. Patient with persistent migraine despite cocktail given in ED. CT with evidence of intracranial hypertension. Admission for further treatment and evaluate with Neurology consult. All questions answered to the best of my ability.     Review of Systems   Constitutional:  Negative for chills and fever.   HENT:  Negative for ear pain and sore throat.    Eyes:  Positive for visual disturbance. Negative for pain.   Respiratory:  Negative for cough and shortness of breath.    Cardiovascular:  Negative for chest pain and palpitations.   Gastrointestinal:  Negative for abdominal pain and vomiting.   Genitourinary:  Negative for dysuria and hematuria.   Musculoskeletal:  Negative for arthralgias and back pain.   Skin:  Negative for color change and rash.   Neurological:  Positive for headaches. Negative for seizures and syncope.   All other systems reviewed and are negative.      Historical Information   Past Medical History:   Diagnosis Date    Abnormal weight gain     Anemia 2017    during pregnancy    Candidiasis, mouth     Cellulitis of right arm     Diverticulitis of colon     Kidney infection     Lyme disease     Pre-employment health screening examination 2018    SIRS (systemic inflammatory response syndrome) (HCC)     Tinea corporis      Past Surgical History:   Procedure Laterality Date    BREAST BIOPSY       SECTION      FOOT SURGERY      HYSTERECTOMY  2024    Uterus and cervix    IR LUMBAR PUNCTURE  2025    SD CYSTOURETHROSCOPY N/A 2024    Procedure: CYSTOSCOPY;  Surgeon: Keyla Peter MD;  Location: [facility];  Service: Gynecology    SD LAPS TOTAL HYSTERECT 250 GM/< W/RMVL TUBE/OVARY Bilateral 2024    Procedure: HYSTERECTOMY LAPAROSCOPIC TOTAL (LTH) WITH RIGHT PARTIAL SALPINGECTOMY, LYSIS OF ADHESIONS, EXAM UNDER ANESTHESIA;  Surgeon: Keyla Peter,  MD;  Location: [facility];  Service: Gynecology    SALPINGECTOMY Bilateral     TUBAL LIGATION  4/19/2021    Tubes removed    US GUIDED BREAST BIOPSY RIGHT COMPLETE Right 11/18/2021     Social History     Tobacco Use    Smoking status: Never     Passive exposure: Never    Smokeless tobacco: Never   Vaping Use    Vaping status: Never Used   Substance and Sexual Activity    Alcohol use: Not Currently     Comment: social    Drug use: No    Sexual activity: Not Currently     Partners: Male     Birth control/protection: Female Sterilization     E-Cigarette/Vaping    E-Cigarette Use Never User      E-Cigarette/Vaping Substances    Nicotine No     THC No     CBD No     Flavoring No     Other No     Unknown No      Family History   Problem Relation Age of Onset    Diabetes Mother     Atrial fibrillation Mother     No Known Problems Father     Diabetes Maternal Grandmother     Cancer Maternal Grandfather     Cancer Paternal Grandfather     Depression Half-Sister     Learning disabilities Half-Brother     Auditory processing disorder Half-Brother     Breast cancer Other      Social History:  Marital Status: /Civil Union   Occupation: NA  Patient Pre-hospital Living Situation: Home  Patient Pre-hospital Level of Mobility: walks  Patient Pre-hospital Diet Restrictions: None    Meds/Allergies   I have reviewed home medications with patient personally.  Prior to Admission medications    Medication Sig Start Date End Date Taking? Authorizing Provider   ondansetron (ZOFRAN) 4 mg tablet Take 1 tablet (4 mg total) by mouth every 6 (six) hours as needed for nausea or vomiting 4/22/25   Re Tian MD   polyethylene glycol (Golytely) 4000 mL solution Take 4,000 mL by mouth once for 1 dose Take according to instructions given by the office for colonoscopy bowel prep. 5/1/25 5/6/25  Carlos Corral MD   SUMAtriptan (IMITREX) 100 mg tablet Take 1 tablet (100 mg total) by mouth once as needed for migraine (may repeat in 4  hours, max 200 mg in 24 hours) may repeat in 2 hours if necessary. Max dose 200mg in 24 hour period. 5/6/25   Leigh Baker, DO     No Known Allergies    Objective :  Temp:  [97.7 °F (36.5 °C)] 97.7 °F (36.5 °C)  HR:  [67-95] 67  BP: (122-177)/() 128/73  Resp:  [18-20] 18  SpO2:  [97 %-99 %] 98 %  O2 Device: None (Room air)    Physical Exam  Vitals and nursing note reviewed.   Constitutional:       General: She is not in acute distress.     Appearance: She is well-developed.   HENT:      Head: Normocephalic and atraumatic.   Eyes:      Conjunctiva/sclera: Conjunctivae normal.   Cardiovascular:      Rate and Rhythm: Normal rate and regular rhythm.      Heart sounds: No murmur heard.  Pulmonary:      Effort: Pulmonary effort is normal. No respiratory distress.      Breath sounds: Normal breath sounds.   Abdominal:      Palpations: Abdomen is soft.      Tenderness: There is no abdominal tenderness.   Musculoskeletal:         General: No swelling.      Cervical back: Neck supple.   Skin:     General: Skin is warm and dry.   Neurological:      General: No focal deficit present.      Mental Status: She is alert.   Psychiatric:         Mood and Affect: Mood normal.          Lines/Drains:            Lab Results: I have reviewed the following results:  Results from last 7 days   Lab Units 05/07/25  1154   WBC Thousand/uL 8.38   HEMOGLOBIN g/dL 14.7   HEMATOCRIT % 44.9   PLATELETS Thousands/uL 346   SEGS PCT % 48   LYMPHO PCT % 45*   MONO PCT % 4   EOS PCT % 2     Results from last 7 days   Lab Units 05/07/25  1154   SODIUM mmol/L 136   POTASSIUM mmol/L 3.9   CHLORIDE mmol/L 103   CO2 mmol/L 23   BUN mg/dL 17   CREATININE mg/dL 0.70   ANION GAP mmol/L 10   CALCIUM mg/dL 9.6   ALBUMIN g/dL 4.1   TOTAL BILIRUBIN mg/dL 0.40   ALK PHOS U/L 49   ALT U/L 24   AST U/L 18   GLUCOSE RANDOM mg/dL 85     Results from last 7 days   Lab Units 05/07/25  1423   INR  0.99         Lab Results   Component Value Date    HGBA1C 5.6  04/14/2025    HGBA1C 5.6 11/29/2024    HGBA1C 5.4 04/06/2024           Imaging Results Review: I reviewed radiology reports from this admission including: CT head.  Other Study Results Review: No additional pertinent studies reviewed.    Administrative Statements   I have spent a total time of 50 minutes in caring for this patient on the day of the visit/encounter including Diagnostic results, Risks and benefits of tx options, Instructions for management, Patient and family education, Counseling / Coordination of care, Documenting in the medical record, Reviewing/placing orders in the medical record (including tests, medications, and/or procedures), Obtaining or reviewing history  , and Communicating with other healthcare professionals .    ** Please Note: This note has been constructed using a voice recognition system. **

## 2025-05-07 NOTE — BRIEF OP NOTE (RAD/CATH)
INTERVENTIONAL RADIOLOGY PROCEDURE NOTE    Date: 5/7/2025    Procedure:   Procedure Summary       Date:  Room / Location:     Anesthesia Start:  Anesthesia Stop:     Procedure:  Diagnosis:     Scheduled Providers:  Responsible Provider:     Anesthesia Type: Not recorded ASA Status: Not recorded            Preoperative diagnosis: No diagnosis found.     Postoperative diagnosis: Same.    Surgeon: Garcia Barker MD     Assistant: None. No qualified resident was available.    Blood loss: Minimal    Specimens: CSF     Findings: LP performed, opening pressure 23.9 cmH20, closing 9.9 cmH20.    Complications: None immediate.    Anesthesia: local

## 2025-05-07 NOTE — TELEPHONE ENCOUNTER
She needs to go to the ER for the headache  It should have improved with the toradol I have her yesterday  Leigh Baker, DO

## 2025-05-07 NOTE — ASSESSMENT & PLAN NOTE
As evidenced on CT head  S/p LP per IR with opening pressure 23.9. Follow up fluid studies  ED discussed case with Neurology. Recommending Diamox 500mg BID and MRI brain  See treatment above under migraine

## 2025-05-07 NOTE — ASSESSMENT & PLAN NOTE
Patient presents to the ED for migraine over the last 2 days with associated visual disturbance. Denies any dizziness, fevers, chills, weakness, numbness/tingling.   S/p migraine cocktail in the ED with improvement of migraine to 4/10  CT head: No acute intracranial abnormality. Expanded partially empty sella as well as dilated optic nerve sheaths would support a clinical diagnosis of idiopathic intracranial hypotension in the setting of headaches.  Continue migraine cocktail medications Q8H, dim lights, close door to limit harsh sounds  Obtain MRI brain  Neuro checks  Consult to Neurology, recommendations are appreciated

## 2025-05-07 NOTE — TELEPHONE ENCOUNTER
Patient called and stated she seen in the office for migraines yesterday .Patient stated she woke with a another migraine this morning  pain was at a 5 pain has now increased to a 8.Patient took one dose of Imitrex @ 6:30 with no  relieve.Patient also c/o sensitivity to light  and left eye vision changes.Patient stated the same symptoms she has had in the past.Patent called for advise and recommendations.

## 2025-05-07 NOTE — PLAN OF CARE
Problem: PAIN - ADULT  Goal: Verbalizes/displays adequate comfort level or baseline comfort level  Description: Interventions:- Encourage patient to monitor pain and request assistance- Assess pain using appropriate pain scale- Administer analgesics based on type and severity of pain and evaluate response- Implement non-pharmacological measures as appropriate and evaluate response- Consider cultural and social influences on pain and pain management- Notify physician/advanced practitioner if interventions unsuccessful or patient reports new pain  Outcome: Progressing     Problem: INFECTION - ADULT  Goal: Absence or prevention of progression during hospitalization  Description: INTERVENTIONS:- Assess and monitor for signs and symptoms of infection- Monitor lab/diagnostic results- Monitor all insertion sites, i.e. indwelling lines, tubes, and drains- Monitor endotracheal if appropriate and nasal secretions for changes in amount and color- [place] appropriate cooling/warming therapies per order- Administer medications as ordered- Instruct and encourage patient and family to use good hand hygiene technique- Identify and instruct in appropriate isolation precautions for identified infection/condition  Outcome: Progressing     Problem: SAFETY ADULT  Goal: Patient will remain free of falls  Description: INTERVENTIONS:- Educate patient/family on patient safety including physical limitations- Instruct patient to call for assistance with activity - Consult OT/PT to assist with strengthening/mobility - Keep Call bell within reach- Keep bed low and locked with side rails adjusted as appropriate- Keep care items and personal belongings within reach- Initiate and maintain comfort rounds  Outcome: Progressing  Goal: Maintain or return to baseline ADL function  Description: INTERVENTIONS:-  Assess patient's ability to carry out ADLs; assess patient's baseline for ADL function and identify physical deficits which impact ability  to perform ADLs (bathing, care of mouth/teeth, toileting, grooming, dressing, etc.)- Assess/evaluate cause of self-care deficits - Assess range of motion- Assess patient's mobility; develop plan if impaired- Assess patient's need for assistive devices and provide as appropriate- Encourage maximum independence but intervene and supervise when necessary- Involve family in performance of ADLs- Assess for home care needs following discharge - Consider OT consult to assist with ADL evaluation and planning for discharge- Provide patient education as appropriate  Outcome: Progressing  Goal: Maintains/Returns to pre admission functional level  Description: INTERVENTIONS:- Perform AM-PAC 6 Click Basic Mobility/ Daily Activity assessment daily.- Set and communicate daily mobility goal to care team and patient/family/caregiver. - Collaborate with rehabilitation services on mobility goals if consulted-  Out of bed for meals 3 times a day- Out of bed for toileting- Record patient progress and toleration of activity level   Outcome: Progressing     Problem: DISCHARGE PLANNING  Goal: Discharge to home or other facility with appropriate resources  Description: INTERVENTIONS:- Identify barriers to discharge w/patient and caregiver- Arrange for needed discharge resources and transportation as appropriate- Identify discharge learning needs (meds, wound care, etc.)- Arrange for interpretive services to assist at discharge as needed- Refer to Case Management Department for coordinating discharge planning if the patient needs post-hospital services based on physician/advanced practitioner order or complex needs related to functional status, cognitive ability, or social support system  Outcome: Progressing     Problem: Knowledge Deficit  Goal: Patient/family/caregiver demonstrates understanding of disease process, treatment plan, medications, and discharge instructions  Description: Complete learning assessment and assess knowledge  base.Interventions:- Provide teaching at level of understanding- Provide teaching via preferred learning methods  Outcome: Progressing     Problem: NEUROSENSORY - ADULT  Goal: Achieves stable or improved neurological status  Description: INTERVENTIONS- Monitor and report changes in neurological status- Monitor vital signs such as temperature, blood pressure, glucose, and any other labs ordered - Initiate measures to prevent increased intracranial pressure- Monitor for seizure activity and implement precautions if appropriate    Outcome: Progressing

## 2025-05-07 NOTE — ED PROVIDER NOTES
Time reflects when diagnosis was documented in both MDM as applicable and the Disposition within this note       Time User Action Codes Description Comment    5/7/2025  4:10 PM Hayden Barfield Add [G43.909] Migraine headache     5/7/2025  4:11 PM Hayden Barfield Add [G43.019] Intractable migraine without aura and without status migrainosus     5/7/2025  4:22 PM Belgica Yuan Add [G93.2] Intracranial hypertension           ED Disposition       ED Disposition   Admit    Condition   Stable    Date/Time   Wed May 7, 2025  4:09 PM    Comment   Case was discussed with carlos and the patient's admission status was agreed to be Admission Status: observation status to the service of Dr. Currie .               Assessment & Plan       Medical Decision Making  34-year-old female with migraine headache for the last 2 days.    Amount and/or Complexity of Data Reviewed  Labs: ordered.  Radiology: ordered.    Risk  Prescription drug management.  Decision regarding hospitalization.             Medications   acetaZOLAMIDE (DIAMOX) tablet 500 mg (has no administration in time range)   acetaminophen (TYLENOL) tablet 650 mg (has no administration in time range)   ketorolac (TORADOL) injection 30 mg (has no administration in time range)   metoclopramide (REGLAN) injection 10 mg (has no administration in time range)   diphenhydrAMINE (BENADRYL) injection 25 mg (has no administration in time range)   SUMAtriptan (IMITREX) subcutaneous injection 6 mg (has no administration in time range)   magnesium sulfate 2 g/50 mL IVPB (premix) 2 g (has no administration in time range)   ketorolac (TORADOL) injection 15 mg (15 mg Intravenous Given 5/7/25 1156)   diphenhydrAMINE (BENADRYL) injection 25 mg (25 mg Intravenous Given 5/7/25 1158)   metoclopramide (REGLAN) injection 10 mg (10 mg Intravenous Given 5/7/25 1201)   magnesium sulfate IVPB (premix) SOLN 1 g (0 g Intravenous Stopped 5/7/25 1420)   sodium chloride 0.9 % bolus 1,000 mL (0 mL  Intravenous Stopped 25 1255)   lidocaine 1% buffered (4 mL Infiltration Given 25 1514)       ED Risk Strat Scores                    No data recorded                            History of Present Illness       Chief Complaint   Patient presents with    Headache     C/o headache for a week,seen primary yesterday, given meds with no relief. Primary referred pt here       Past Medical History:   Diagnosis Date    Abnormal weight gain     Anemia     during pregnancy    Candidiasis, mouth     Cellulitis of right arm     Diverticulitis of colon     Kidney infection     Lyme disease     Pre-employment health screening examination 2018    SIRS (systemic inflammatory response syndrome) (HCC)     Tinea corporis       Past Surgical History:   Procedure Laterality Date    BREAST BIOPSY       SECTION      FOOT SURGERY      HYSTERECTOMY  2024    Uterus and cervix    IR LUMBAR PUNCTURE  2025    OH CYSTOURETHROSCOPY N/A 2024    Procedure: CYSTOSCOPY;  Surgeon: Keyla Peter MD;  Location: [facility];  Service: Gynecology    OH LAPS TOTAL HYSTERECT 250 GM/< W/RMVL TUBE/OVARY Bilateral 2024    Procedure: HYSTERECTOMY LAPAROSCOPIC TOTAL (LTH) WITH RIGHT PARTIAL SALPINGECTOMY, LYSIS OF ADHESIONS, EXAM UNDER ANESTHESIA;  Surgeon: Keyla Peter MD;  Location: [facility];  Service: Gynecology    SALPINGECTOMY Bilateral     TUBAL LIGATION  2021    Tubes removed    US GUIDED BREAST BIOPSY RIGHT COMPLETE Right 2021      Family History   Problem Relation Age of Onset    Diabetes Mother     Atrial fibrillation Mother     No Known Problems Father     Diabetes Maternal Grandmother     Cancer Maternal Grandfather     Cancer Paternal Grandfather     Depression Half-Sister     Learning disabilities Half-Brother     Auditory processing disorder Half-Brother     Breast cancer Other       Social History     Tobacco Use    Smoking status: Never     Passive exposure: Never     Smokeless tobacco: Never   Vaping Use    Vaping status: Never Used   Substance Use Topics    Alcohol use: Not Currently     Comment: social    Drug use: No      E-Cigarette/Vaping    E-Cigarette Use Never User       E-Cigarette/Vaping Substances    Nicotine No     THC No     CBD No     Flavoring No     Other No     Unknown No       I have reviewed and agree with the history as documented.     34-year-old female states she has migraine headache over the last 2 days went to her doctor yesterday he gave her Toradol which did relieve the pain however shortly after her returning to her house it came back.  States left side of her head does have some visual disturbances in the left eye some nausea and vomiting states this is the same area as her prior migraines however this 1 seems to be a little more severe and refractory.        Review of Systems   Constitutional:  Negative for activity change, chills, diaphoresis and fever.   HENT:  Negative for congestion, ear pain, nosebleeds, sore throat, trouble swallowing and voice change.    Eyes:  Negative for pain, discharge and redness.   Respiratory:  Negative for apnea, cough, choking, shortness of breath, wheezing and stridor.    Cardiovascular:  Negative for chest pain and palpitations.   Gastrointestinal:  Positive for nausea and vomiting. Negative for abdominal distention, abdominal pain, constipation and diarrhea.   Endocrine: Negative for polydipsia.   Genitourinary:  Negative for difficulty urinating, dysuria, flank pain, frequency, hematuria and urgency.   Musculoskeletal:  Negative for back pain, gait problem, joint swelling, myalgias, neck pain and neck stiffness.   Skin:  Negative for pallor and rash.   Neurological:  Positive for headaches. Negative for dizziness, tremors, syncope, speech difficulty, weakness and numbness.   Hematological:  Negative for adenopathy.   Psychiatric/Behavioral:  Negative for confusion, hallucinations, self-injury and suicidal  ideas. The patient is not nervous/anxious.            Objective       ED Triage Vitals [05/07/25 1125]   Temperature Pulse Blood Pressure Respirations SpO2 Patient Position - Orthostatic VS   97.7 °F (36.5 °C) 95 (!) 177/105 20 98 % Sitting      Temp Source Heart Rate Source BP Location FiO2 (%) Pain Score    Tympanic Monitor Right arm -- 8      Vitals      Date and Time Temp Pulse SpO2 Resp BP Pain Score FACES Pain Rating User   05/07/25 1728 97.6 °F (36.4 °C) 62 100 % 18 134/77 -- --    05/07/25 1713 -- -- -- -- -- 7 --    05/07/25 1653 97.6 °F (36.4 °C) 63 100 % -- 134/77 6 --    05/07/25 1652 97.6 °F (36.4 °C) 62 100 % 18 134/77 -- -- DII   05/07/25 1615 -- 67 98 % -- 128/73 -- --    05/07/25 1600 -- 71 97 % -- 129/72 -- --    05/07/25 1430 -- 69 99 % 18 122/70 -- --    05/07/25 1426 -- -- -- -- -- 8 --    05/07/25 1156 -- -- -- -- -- 9 --    05/07/25 1125 97.7 °F (36.5 °C) 95 98 % 20 177/105 8 -- LS            Physical Exam  Vitals and nursing note reviewed.   Constitutional:       General: She is not in acute distress.     Appearance: She is well-developed. She is not diaphoretic.   HENT:      Head: Normocephalic and atraumatic.      Right Ear: External ear normal.      Left Ear: External ear normal.      Nose: Nose normal.   Eyes:      Conjunctiva/sclera: Conjunctivae normal.      Pupils: Pupils are equal, round, and reactive to light.   Cardiovascular:      Rate and Rhythm: Normal rate and regular rhythm.      Heart sounds: Normal heart sounds.   Pulmonary:      Effort: Pulmonary effort is normal.      Breath sounds: Normal breath sounds.   Abdominal:      General: Bowel sounds are normal.      Palpations: Abdomen is soft.      Tenderness: There is no abdominal tenderness.   Musculoskeletal:         General: Normal range of motion.      Cervical back: Normal range of motion and neck supple.   Skin:     General: Skin is warm and dry.   Neurological:      Mental Status: She is alert and  oriented to person, place, and time.      Deep Tendon Reflexes: Reflexes are normal and symmetric.   Psychiatric:         Behavior: Behavior is cooperative.         Results Reviewed       Procedure Component Value Units Date/Time    STAT Gram Stain [[id number]] Collected: 05/07/25 1521    Lab Status: Final result Specimen: Other from Lumbar Puncture Updated: 05/07/25 1710     Gram Stain Result No No Polys or Bacteria seen    CSF white cell count with differential [[id number]] Collected: 05/07/25 1521    Lab Status: Final result Specimen: Cerebrospinal Fluid from Lumbar Puncture Updated: 05/07/25 1628     Tube Number, CSF 4     WBC, CSF 0 /uL      Xanthochromia No     Appearance, CSF Clear    Glucose, CSF [[id number]]  (Normal) Collected: 05/07/25 1521    Lab Status: Final result Specimen: Cerebrospinal Fluid from Lumbar Puncture Updated: 05/07/25 1604     Glucose, CSF 52 mg/dL     CSF Total Protein [[id number]]  (Normal) Collected: 05/07/25 1521    Lab Status: Final result Specimen: Cerebrospinal Fluid from Lumbar Puncture Updated: 05/07/25 1604     Protein, CSF 29 mg/dL     CSF culture and Gram stain [[id number]] Collected: 05/07/25 1521    Lab Status: In process Specimen: Cerebrospinal Fluid from Lumbar Puncture Updated: 05/07/25 1533    Protime-INR [[id number]]  (Normal) Collected: 05/07/25 1423    Lab Status: Final result Specimen: Blood from Arm, Left Updated: 05/07/25 1441     Protime 13.6 seconds      INR 0.99    Narrative:      INR Therapeutic Range    Indication                                             INR Range      Atrial Fibrillation                                               2.0-3.0  Hypercoagulable State                                    2.0.2.3  Left Ventricular Asist Device                            2.0-3.0  Mechanical Heart Valve                                  -    Aortic(with afib, MI, embolism, HF, LA enlargement,    and/or coagulopathy)                                     2.0-3.0 (2.5-3.5)      Mitral                                                             2.5-3.5  Prosthetic/Bioprosthetic Heart Valve               2.0-3.0  Venous thromboembolism (VTE: VT, PE        2.0-3.0    APTT [[id number]]  (Normal) Collected: 05/07/25 1423    Lab Status: Final result Specimen: Blood from Arm, Left Updated: 05/07/25 1441     PTT 27 seconds     Comprehensive metabolic panel [[id number]] Collected: 05/07/25 1154    Lab Status: Final result Specimen: Blood from Arm, Left Updated: 05/07/25 1229     Sodium 136 mmol/L      Potassium 3.9 mmol/L      Chloride 103 mmol/L      CO2 23 mmol/L      ANION GAP 10 mmol/L      BUN 17 mg/dL      Creatinine 0.70 mg/dL      Glucose 85 mg/dL      Calcium 9.6 mg/dL      AST 18 U/L      ALT 24 U/L      Alkaline Phosphatase 49 U/L      Total Protein 7.5 g/dL      Albumin 4.1 g/dL      Total Bilirubin 0.40 mg/dL      eGFR 113 ml/min/1.73sq m     Narrative:      [employer] guidelines for Chronic Kidney Disease (CKD):     Stage 1 with normal or high GFR (GFR > 90 mL/min/1.73 square meters)    Stage 2 Mild CKD (GFR = 60-89 mL/min/1.73 square meters)    Stage 3A Moderate CKD (GFR = 45-59 mL/min/1.73 square meters)    Stage 3B Moderate CKD (GFR = 30-44 mL/min/1.73 square meters)    Stage 4 Severe CKD (GFR = 15-29 mL/min/1.73 square meters)    Stage 5 End Stage CKD (GFR <15 mL/min/1.73 square meters)  Note: GFR calculation is accurate only with a steady state creatinine    CBC and differential [[id number]]  (Abnormal) Collected: 05/07/25 1154    Lab Status: Final result Specimen: Blood from Arm, Left Updated: 05/07/25 1213     WBC 8.38 Thousand/uL      RBC 5.08 Million/uL      Hemoglobin 14.7 g/dL      Hematocrit 44.9 %      MCV 88 fL      MCH 28.9 pg      MCHC 32.7 g/dL      RDW 13.1 %      MPV 10.2 fL      Platelets 346 Thousands/uL      nRBC 0 /100 WBCs      Segmented % 48 %      Immature Grans % 0 %      Lymphocytes % 45 %      Monocytes % 4 %      Eosinophils Relative  2 %      Basophils Relative 1 %      Absolute Neutrophils 4.06 Thousands/µL      Absolute Immature Grans 0.02 Thousand/uL      Absolute Lymphocytes 3.73 Thousands/µL      Absolute Monocytes 0.36 Thousand/µL      Eosinophils Absolute 0.16 Thousand/µL      Basophils Absolute 0.05 Thousands/µL             IR lumbar puncture   Final Interpretation by Garcia Barker MD (05/07 1557)   Fluoroscopic-guided diagnostic lumbar puncture      Workstation performed: [id number]         CT head without contrast   Final Interpretation by Abigail Vogel MD (05/07 1552)   Addendum (preliminary) 1 of 1 by Abigail Vogel MD (05/07 1552)   ADDENDUM:   Impression should read:      Expanded partially empty sella as well as dilated optic nerve sheaths    would support a clinical diagnosis of idiopathic intracranial hypertension    in the setting of headaches.            Final      No acute intracranial abnormality.      Expanded partially empty sella as well as dilated optic nerve sheaths would support a clinical diagnosis of idiopathic intracranial hypotension in the setting of headaches.      The study was marked in EPIC for immediate notification.                  Workstation performed: [id number]         MRI brain wo contrast    (Results Pending)       Procedures    ED Medication and Procedure Management   Prior to Admission Medications   Prescriptions Last Dose Informant Patient Reported? Taking?   SUMAtriptan (IMITREX) 100 mg tablet 5/7/2025 at  6:30 AM  No Yes   Sig: Take 1 tablet (100 mg total) by mouth once as needed for migraine (may repeat in 4 hours, max 200 mg in 24 hours) may repeat in 2 hours if necessary. Max dose 200mg in 24 hour period.   ondansetron (ZOFRAN) 4 mg tablet Not Taking Self No No   Sig: Take 1 tablet (4 mg total) by mouth every 6 (six) hours as needed for nausea or vomiting   Patient not taking: Reported on 5/7/2025   polyethylene glycol (Golytely) 4000 mL solution   No No   Sig: Take 4,000 mL by mouth  once for 1 dose Take according to instructions given by the office for colonoscopy bowel prep.      Facility-Administered Medications Last Administration Doses Remaining   ketorolac (TORADOL) 60 mg/2 mL IM injection 60 mg 5/6/2025  3:41 PM 0        Current Discharge Medication List        CONTINUE these medications which have NOT CHANGED    Details   SUMAtriptan (IMITREX) 100 mg tablet Take 1 tablet (100 mg total) by mouth once as needed for migraine (may repeat in 4 hours, max 200 mg in 24 hours) may repeat in 2 hours if necessary. Max dose 200mg in 24 hour period.  Qty: 10 tablet, Refills: 5    Associated Diagnoses: Intractable migraine without aura and without status migrainosus      ondansetron (ZOFRAN) 4 mg tablet Take 1 tablet (4 mg total) by mouth every 6 (six) hours as needed for nausea or vomiting  Qty: 20 tablet, Refills: 0    Associated Diagnoses: Nausea      polyethylene glycol (Golytely) 4000 mL solution Take 4,000 mL by mouth once for 1 dose Take according to instructions given by the office for colonoscopy bowel prep.  Qty: 4000 mL, Refills: 0    Associated Diagnoses: Abnormal CT of the abdomen           No discharge procedures on file.  ED SEPSIS DOCUMENTATION   Time reflects when diagnosis was documented in both MDM as applicable and the Disposition within this note       Time User Action Codes Description Comment    5/7/2025  4:10 PM Hayden Barfield [G43.909] Migraine headache     5/7/2025  4:11 PM Hayden Barfield [G43.019] Intractable migraine without aura and without status migrainosus     5/7/2025  4:22 PM Belgica Yuan Add [G93.2] Intracranial hypertension                  Hayden Barfield, DO  05/07/25 1613       Hayden Barfield,   05/07/25 1750

## 2025-05-07 NOTE — DISCHARGE INSTRUCTIONS
Lumbar Puncture     WHAT YOU NEED TO KNOW:   Lumbar puncture (LP) is a procedure in which a needle is inserted in your back and into your spinal canal. This is usually done to collect cerebrospinal fluid (CSF) to check for an infection, inflammation, bleeding, or other conditions that affect the brain. CSF is a clear, protective fluid that flows around the brain and inside the spinal canal. LP may also be done to remove CSF to reduce pressure in the brain.     DISCHARGE INSTRUCTIONS:     Follow up with your healthcare provider as directed: Write down your questions so you remember to ask them during your visits.     Post-lumbar puncture headache: You may develop a headache during the first few hours after your LP that may last for several days. The headache may be mild to severe and may get worse when you sit or stand. The following may help ease a post-lumbar puncture headache:  Drink plenty of liquids: You should drink more liquid than usual after your LP. Ask how much liquid is right for you. Caffeine may be used to treat a headache. Drinks, such as coffee, tea, or some sodas, have caffeine. Ask a Do not drink alcohol.    Lie down: If you have a headache after your lumbar puncture, it may be helpful to lie down and rest.  You may have a slight soreness over the LP area. This is normal.  Remove the band aid or dressing in 24 hours.    Contact Interventional Radiology imediately  at [phone] ([facility] PATIENTS: Contact Interventional Radiology at [phone]) ([facility] PATIENTS: Contact Interventional Radiology at [phone]) if any of the following occur:  You have a severe headache that does not get better after you lie down.  Persistent nausea or vomiting   You have a fever.   You have a stiff neck or have trouble thinking clearly.   Your legs, feet, or other parts below the waist feel numb, tingly, or weak.   You have bleeding or a discharge coming from the area where the needle was put into your back.   You  have severe pain in your back or neck.

## 2025-05-07 NOTE — SEDATION DOCUMENTATION
Procedure ended. IR lumbar completed by Dr. Barekr. Opening pressure 23.9 cm H2O. 24 ml clear CSF removed. Bandaid applied. Closing pressure 9.9 cm H2O. IR post procedural flat bedrest x2 hours. Patient transferred back to ED.

## 2025-05-08 ENCOUNTER — TELEPHONE (OUTPATIENT)
Age: 35
End: 2025-05-08

## 2025-05-08 VITALS
DIASTOLIC BLOOD PRESSURE: 61 MMHG | HEART RATE: 70 BPM | TEMPERATURE: 98.1 F | WEIGHT: 230.7 LBS | HEIGHT: 62 IN | BODY MASS INDEX: 42.45 KG/M2 | OXYGEN SATURATION: 95 % | RESPIRATION RATE: 16 BRPM | SYSTOLIC BLOOD PRESSURE: 107 MMHG

## 2025-05-08 DIAGNOSIS — G43.919 INTRACTABLE MIGRAINE: Primary | ICD-10-CM

## 2025-05-08 LAB
ANION GAP SERPL CALCULATED.3IONS-SCNC: 8 MMOL/L (ref 4–13)
ATRIAL RATE: 58 BPM
ATRIAL RATE: 63 BPM
BUN SERPL-MCNC: 15 MG/DL (ref 5–25)
CALCIUM SERPL-MCNC: 8.7 MG/DL (ref 8.4–10.2)
CHLORIDE SERPL-SCNC: 108 MMOL/L (ref 96–108)
CO2 SERPL-SCNC: 21 MMOL/L (ref 21–32)
CREAT SERPL-MCNC: 0.73 MG/DL (ref 0.6–1.3)
ERYTHROCYTE [DISTWIDTH] IN BLOOD BY AUTOMATED COUNT: 13.3 % (ref 11.6–15.1)
GFR SERPL CREATININE-BSD FRML MDRD: 107 ML/MIN/1.73SQ M
GLUCOSE SERPL-MCNC: 94 MG/DL (ref 65–140)
HCT VFR BLD AUTO: 40.6 % (ref 34.8–46.1)
HGB BLD-MCNC: 13.1 G/DL (ref 11.5–15.4)
MCH RBC QN AUTO: 29.1 PG (ref 26.8–34.3)
MCHC RBC AUTO-ENTMCNC: 32.3 G/DL (ref 31.4–37.4)
MCV RBC AUTO: 90 FL (ref 82–98)
P AXIS: 18 DEGREES
P AXIS: 73 DEGREES
PLATELET # BLD AUTO: 291 THOUSANDS/UL (ref 149–390)
PMV BLD AUTO: 10.1 FL (ref 8.9–12.7)
POTASSIUM SERPL-SCNC: 4 MMOL/L (ref 3.5–5.3)
PR INTERVAL: 170 MS
PR INTERVAL: 172 MS
QRS AXIS: 35 DEGREES
QRS AXIS: 69 DEGREES
QRSD INTERVAL: 78 MS
QRSD INTERVAL: 82 MS
QT INTERVAL: 400 MS
QT INTERVAL: 420 MS
QTC INTERVAL: 410 MS
QTC INTERVAL: 413 MS
RBC # BLD AUTO: 4.5 MILLION/UL (ref 3.81–5.12)
SODIUM SERPL-SCNC: 137 MMOL/L (ref 135–147)
T WAVE AXIS: 17 DEGREES
T WAVE AXIS: 38 DEGREES
VENTRICULAR RATE: 58 BPM
VENTRICULAR RATE: 63 BPM
WBC # BLD AUTO: 6.91 THOUSAND/UL (ref 4.31–10.16)

## 2025-05-08 PROCEDURE — 99204 OFFICE O/P NEW MOD 45 MIN: CPT | Performed by: PSYCHIATRY & NEUROLOGY

## 2025-05-08 PROCEDURE — 93005 ELECTROCARDIOGRAM TRACING: CPT

## 2025-05-08 PROCEDURE — 93010 ELECTROCARDIOGRAM REPORT: CPT | Performed by: INTERNAL MEDICINE

## 2025-05-08 PROCEDURE — 85027 COMPLETE CBC AUTOMATED: CPT | Performed by: INTERNAL MEDICINE

## 2025-05-08 PROCEDURE — 99239 HOSP IP/OBS DSCHRG MGMT >30: CPT | Performed by: INTERNAL MEDICINE

## 2025-05-08 PROCEDURE — 80048 BASIC METABOLIC PNL TOTAL CA: CPT | Performed by: INTERNAL MEDICINE

## 2025-05-08 RX ORDER — ONDANSETRON 2 MG/ML
4 INJECTION INTRAMUSCULAR; INTRAVENOUS EVERY 6 HOURS PRN
Status: DISCONTINUED | OUTPATIENT
Start: 2025-05-08 | End: 2025-05-08 | Stop reason: HOSPADM

## 2025-05-08 RX ORDER — ACETAZOLAMIDE 250 MG/1
500 TABLET ORAL EVERY 12 HOURS SCHEDULED
Qty: 120 TABLET | Refills: 0 | Status: SHIPPED | OUTPATIENT
Start: 2025-05-08 | End: 2025-05-21

## 2025-05-08 RX ORDER — DEXAMETHASONE 4 MG/1
TABLET ORAL
Qty: 10 TABLET | Refills: 0 | Status: SHIPPED | OUTPATIENT
Start: 2025-05-08

## 2025-05-08 RX ADMIN — METOCLOPRAMIDE 10 MG: 5 INJECTION, SOLUTION INTRAMUSCULAR; INTRAVENOUS at 16:13

## 2025-05-08 RX ADMIN — KETOROLAC TROMETHAMINE 30 MG: 30 INJECTION, SOLUTION INTRAMUSCULAR; INTRAVENOUS at 16:13

## 2025-05-08 RX ADMIN — DIPHENHYDRAMINE HYDROCHLORIDE 25 MG: 50 INJECTION, SOLUTION INTRAMUSCULAR; INTRAVENOUS at 16:13

## 2025-05-08 RX ADMIN — DIPHENHYDRAMINE HYDROCHLORIDE 25 MG: 50 INJECTION, SOLUTION INTRAMUSCULAR; INTRAVENOUS at 08:17

## 2025-05-08 RX ADMIN — KETOROLAC TROMETHAMINE 30 MG: 30 INJECTION, SOLUTION INTRAMUSCULAR; INTRAVENOUS at 08:17

## 2025-05-08 RX ADMIN — DIPHENHYDRAMINE HYDROCHLORIDE 25 MG: 50 INJECTION, SOLUTION INTRAMUSCULAR; INTRAVENOUS at 00:23

## 2025-05-08 RX ADMIN — METOCLOPRAMIDE 10 MG: 5 INJECTION, SOLUTION INTRAMUSCULAR; INTRAVENOUS at 08:17

## 2025-05-08 RX ADMIN — METOCLOPRAMIDE 10 MG: 5 INJECTION, SOLUTION INTRAMUSCULAR; INTRAVENOUS at 00:23

## 2025-05-08 RX ADMIN — KETOROLAC TROMETHAMINE 30 MG: 30 INJECTION, SOLUTION INTRAMUSCULAR; INTRAVENOUS at 00:23

## 2025-05-08 RX ADMIN — MAGNESIUM SULFATE HEPTAHYDRATE 2 G: 40 INJECTION, SOLUTION INTRAVENOUS at 16:12

## 2025-05-08 RX ADMIN — ACETAZOLAMIDE 500 MG: 250 TABLET ORAL at 08:17

## 2025-05-08 NOTE — PROGRESS NOTES
Progress Note - Hospitalist   Name: Lena Coates 34 y.o. female I MRN: [id number]  Unit/Bed#: [address] [place] [address] I Date of Admission: 5/7/2025   Date of Service: 5/8/2025 I Hospital Day: 0    Assessment & Plan  Intractable migraine without aura and without status migrainosus  Patient presents to the ED for migraine over the last 2 days with associated visual disturbance. Denies any dizziness, fevers, chills, weakness, numbness/tingling.   CT head: No acute intracranial abnormality. Expanded partially empty sella as well as dilated optic nerve sheaths would support a clinical diagnosis of idiopathic intracranial hypotension in the setting of headaches.  MRI brain: Imaging findings that can be seen in the setting of idiopathic intracranial hypertension including distended optic nerve sheaths and partially empty sella   S/p LP per IR with opening pressure 23.9  CSF white blood cells, with 0 white blood cells no xanthochromia  CSF culture pending  CSF protein normal   CSF glucose normal  CSF no polys or bacteria seen  Continue migraine cocktail medications Q8H, dim lights, close door to limit harsh sounds  Continue Diamox 500mg BID  Neuro checks  Referral for Ophthalmology evaluation at discharge  Consult to Neurology, recommendations are appreciated  Intracranial hypertension  As evidenced on CT head  See plan above under migraine for further details  Class 3 severe obesity without serious comorbidity with body mass index (BMI) of 40.0 to 44.9 in adult  Current BMI 40.79 kg/m2  Encouraged health diet and lifestyle modifications  Anxiety  Mood stable. Not currently on medication at home.    VTE Pharmacologic Prophylaxis: VTE Score: 1 Low Risk (Score 0-2) - Encourage Ambulation.    Mobility:   Basic Mobility Inpatient Raw Score: 24  JH-HLM Goal: 8: Walk 250 feet or more  JH-HLM Achieved: 6: Walk 10 steps or more  JH-HLM Goal achieved. Continue to encourage appropriate mobility.    Patient Centered Rounds: I performed  bedside rounds with nursing staff today.   Discussions with Specialists or Other Care Team Provider: Nursing, Neurology    Education and Discussions with Family / Patient: Updated  (mother) via phone.    Current Length of Stay: 0 day(s)  Current Patient Status: Observation   Certification Statement: The patient will continue to require additional inpatient hospital stay due to migraine, ICH  Discharge Plan: Anticipate discharge tomorrow to home.    Code Status: Level 1 - Full Code    Subjective   Patient resting comfortably in bed this morning. Reports slept okay but still has mild migraine today now traveling to behind her ear. Visual changes resolved.     Objective :  Temp:  [97.6 °F (36.4 °C)-98.1 °F (36.7 °C)] 98.1 °F (36.7 °C)  HR:  [52-95] 70  BP: (107-177)/() 107/61  Resp:  [16-20] 16  SpO2:  [95 %-100 %] 95 %  O2 Device: None (Room air)    Body mass index is 42.2 kg/m².     Input and Output Summary (last 24 hours):     Intake/Output Summary (Last 24 hours) at 5/8/2025 0956  Last data filed at 5/7/2025 2301  Gross per 24 hour   Intake 1350 ml   Output --   Net 1350 ml       Physical Exam  Vitals and nursing note reviewed.   Constitutional:       General: She is not in acute distress.     Appearance: She is well-developed.   HENT:      Head: Normocephalic and atraumatic.   Eyes:      Conjunctiva/sclera: Conjunctivae normal.   Cardiovascular:      Rate and Rhythm: Normal rate and regular rhythm.      Heart sounds: No murmur heard.  Pulmonary:      Effort: Pulmonary effort is normal. No respiratory distress.      Breath sounds: Normal breath sounds.   Abdominal:      Palpations: Abdomen is soft.      Tenderness: There is no abdominal tenderness.   Musculoskeletal:         General: No swelling.      Cervical back: Neck supple.   Skin:     General: Skin is warm and dry.   Neurological:      General: No focal deficit present.      Mental Status: She is alert.   Psychiatric:         Mood and  Affect: Mood normal.           Lines/Drains:              Lab Results: I have reviewed the following results:   Results from last 7 days   Lab Units 05/08/25  0540 05/07/25  1154   WBC Thousand/uL 6.91 8.38   HEMOGLOBIN g/dL 13.1 14.7   HEMATOCRIT % 40.6 44.9   PLATELETS Thousands/uL 291 346   SEGS PCT %  --  48   LYMPHO PCT %  --  45*   MONO PCT %  --  4   EOS PCT %  --  2     Results from last 7 days   Lab Units 05/08/25  0540 05/07/25  1154   SODIUM mmol/L 137 136   POTASSIUM mmol/L 4.0 3.9   CHLORIDE mmol/L 108 103   CO2 mmol/L 21 23   BUN mg/dL 15 17   CREATININE mg/dL 0.73 0.70   ANION GAP mmol/L 8 10   CALCIUM mg/dL 8.7 9.6   ALBUMIN g/dL  --  4.1   TOTAL BILIRUBIN mg/dL  --  0.40   ALK PHOS U/L  --  49   ALT U/L  --  24   AST U/L  --  18   GLUCOSE RANDOM mg/dL 94 85     Results from last 7 days   Lab Units 05/07/25  1423   INR  0.99                   Recent Cultures (last 7 days):   Results from last 7 days   Lab Units 05/07/25  1521   GRAM STAIN RESULT  No No Polys or Bacteria seen       Imaging Results Review: I reviewed radiology reports from this admission including: MRI brain.  Other Study Results Review: No additional pertinent studies reviewed.    Last 24 Hours Medication List:     Current Facility-Administered Medications:     acetaminophen (TYLENOL) tablet 650 mg, Q6H PRN    acetaZOLAMIDE (DIAMOX) tablet 500 mg, Q12H ROSE    diphenhydrAMINE (BENADRYL) injection 25 mg, Q8H ROSE    ketorolac (TORADOL) injection 30 mg, Q8H ROSE    magnesium sulfate 2 g/50 mL IVPB (premix) 2 g, Q24H, Last Rate: 2 g (05/07/25 1714)    metoclopramide (REGLAN) injection 10 mg, Q8H ROSE    ondansetron (ZOFRAN) injection 4 mg, Q6H PRN    SUMAtriptan (IMITREX) subcutaneous injection 6 mg, Q1H PRN    Administrative Statements   Today, Patient Was Seen By: Belgica Yuan PA-C  I have spent a total time of 30 minutes in caring for this patient on the day of the visit/encounter including Diagnostic results, Risks and benefits of tx  options, Instructions for management, Patient and family education, Importance of tx compliance, Counseling / Coordination of care, Documenting in the medical record, Reviewing/placing orders in the medical record (including tests, medications, and/or procedures), Obtaining or reviewing history  , and Communicating with other healthcare professionals .    **Please Note: This note may have been constructed using a voice recognition system.**

## 2025-05-08 NOTE — DISCHARGE SUMMARY
Discharge Summary - Hospitalist   Name: Lena Coates 34 y.o. female I MRN: [id number]  Unit/Bed#: [address] [place] [address] Date of Admission: 5/7/2025   Date of Service: 5/8/2025 I Hospital Day: 0     Assessment & Plan  Intractable migraine without aura and without status migrainosus  Patient presents to the ED for migraine over the last 2 days with associated visual disturbance. Denies any dizziness, fevers, chills, weakness, numbness/tingling.   CT head: No acute intracranial abnormality. Expanded partially empty sella as well as dilated optic nerve sheaths would support a clinical diagnosis of idiopathic intracranial hypotension in the setting of headaches.  MRI brain: Imaging findings that can be seen in the setting of idiopathic intracranial hypertension including distended optic nerve sheaths and partially empty sella   S/p LP per IR with opening pressure 23.9  CSF white blood cells, with 0 white blood cells no xanthochromia  CSF culture no growth  CSF protein normal   CSF glucose normal  CSF no polys or bacteria seen  Supportive care with migraine cocktail medications Q8H, dim lights, close door to limit harsh sounds  Neuro checks  Referral for Ophthalmology evaluation at discharge  Consult to Neurology, recommendations are appreciated. Continue Diamox 500mg BID at discharge. Follow up in office in 8-10 weeks.  Intracranial hypertension  As evidenced on CT head  See plan above under migraine for further details  Class 3 severe obesity without serious comorbidity with body mass index (BMI) of 40.0 to 44.9 in adult  Current BMI 40.79 kg/m2  Encouraged health diet and lifestyle modifications  Anxiety  Mood stable. Not currently on medication at home.     Medical Problems       Resolved Problems  Date Reviewed: 5/6/2025   None       Discharging Physician / Practitioner: Belgica Yuan PA-C  PCP: Leigh Baker DO  Admission Date:   Admission Orders (From admission, onward)       Ordered        05/07/25 1610  Place in  Observation  Once                          Discharge Date: 05/08/25    Consultations During Hospital Stay:  Neurology    Procedures Performed:   Lumbar puncture 5/7/25    Significant Findings / Test Results:   CT head (5/7/25): No acute intracranial abnormality. Expanded partially empty sella as well as dilated optic nerve sheaths would support a clinical diagnosis of idiopathic intracranial hypotension in the setting of headaches.  MRI brain (5/7/25):Imaging findings that can be seen in the setting of idiopathic intracranial hypertension including distended optic nerve sheaths and partially empty sella.     Incidental Findings:   None     Test Results Pending at Discharge (will require follow up):   None     Outpatient Tests Requested:  None    Complications:  None    Reason for Admission: Migraine    Hospital Course:   Lena Coates is a 34 y.o. female patient who originally presented to the hospital on 5/7/2025 due to intractable migraine. On initial work up in the ED patient with evidence of intracranial hypertension on CT scan. Patient underwent lumbar puncture per IR with noted elevated opening pressure. Due to these findings and ongoing migraine complaints she was admitted to the hospital. She was started on supportive care treatment with migraine cocktail and nursing limited noise and lighting. Lumbar puncture fluid studies negative for inflammatory or infectious disease. MRI brain obtained and confirmed findings consistent with intracranial hypertension. Neurology consulted. Recommending Diamox 500mg BID at discharge and outpatient follow up with Neurology in 8-10 weeks as well as Ophthalmology evaluation. Referrals placed. Patient reports this evening her migraine symptoms resolved and she is eager for discharge home. All questions answered to the best of my ability.       Please see above list of diagnoses and related plan for additional information.     Condition at Discharge: stable    Discharge  Day Visit / Exam:   * Please refer to separate progress note for these details *    Discussion with Family: Updated  (mother) via phone.    Discharge instructions/Information to patient and family:   See after visit summary for information provided to patient and family.      Provisions for Follow-Up Care:  See after visit summary for information related to follow-up care and any pertinent home health orders.      Mobility at time of Discharge:   Basic Mobility Inpatient Raw Score: 24  JH-HLM Goal: 8: Walk 250 feet or more  JH-HLM Achieved: 8: Walk 250 feet ot more  HLM Goal achieved. Continue to encourage appropriate mobility.     Disposition:   Home    Planned Readmission: None    Discharge Medications:  See after visit summary for reconciled discharge medications provided to patient and/or family.      Administrative Statements   Discharge Statement:  I have spent a total time of 50 minutes in caring for this patient on the day of the visit/encounter. >30 minutes of time was spent on: Diagnostic results, Risks and benefits of tx options, Instructions for management, Patient and family education, Importance of tx compliance, Counseling / Coordination of care, Documenting in the medical record, Reviewing / ordering tests, medicine, procedures  , and Communicating with other healthcare professionals .    **Please Note: This note may have been constructed using a voice recognition system**

## 2025-05-08 NOTE — PLAN OF CARE
Problem: PAIN - ADULT  Goal: Verbalizes/displays adequate comfort level or baseline comfort level  Description: Interventions:- Encourage patient to monitor pain and request assistance- Assess pain using appropriate pain scale- Administer analgesics based on type and severity of pain and evaluate response- Implement non-pharmacological measures as appropriate and evaluate response- Consider cultural and social influences on pain and pain management- Notify physician/advanced practitioner if interventions unsuccessful or patient reports new pain  Outcome: Progressing     Problem: INFECTION - ADULT  Goal: Absence or prevention of progression during hospitalization  Description: INTERVENTIONS:- Assess and monitor for signs and symptoms of infection- Monitor lab/diagnostic results- Monitor all insertion sites, i.e. indwelling lines, tubes, and drains- Monitor endotracheal if appropriate and nasal secretions for changes in amount and color- [place] appropriate cooling/warming therapies per order- Administer medications as ordered- Instruct and encourage patient and family to use good hand hygiene technique- Identify and instruct in appropriate isolation precautions for identified infection/condition  Outcome: Progressing     Problem: SAFETY ADULT  Goal: Patient will remain free of falls  Description: INTERVENTIONS:- Educate patient/family on patient safety including physical limitations- Instruct patient to call for assistance with activity - Consult OT/PT to assist with strengthening/mobility - Keep Call bell within reach- Keep bed low and locked with side rails adjusted as appropriate- Keep care items and personal belongings within reach- Initiate and maintain comfort rounds- Make Fall Risk Sign visible to staff- Offer Toileting every 2 Hours, in advance of need- Initiate/Maintain bed alarm- Obtain necessary fall risk management equipment: yes- Apply yellow socks and bracelet for high fall risk patients- Consider  moving patient to room near nurses station  Outcome: Progressing  Goal: Maintain or return to baseline ADL function  Description: INTERVENTIONS:-  Assess patient's ability to carry out ADLs; assess patient's baseline for ADL function and identify physical deficits which impact ability to perform ADLs (bathing, care of mouth/teeth, toileting, grooming, dressing, etc.)- Assess/evaluate cause of self-care deficits - Assess range of motion- Assess patient's mobility; develop plan if impaired- Assess patient's need for assistive devices and provide as appropriate- Encourage maximum independence but intervene and supervise when necessary- Involve family in performance of ADLs- Assess for home care needs following discharge - Consider OT consult to assist with ADL evaluation and planning for discharge- Provide patient education as appropriate  Outcome: Progressing  Goal: Maintains/Returns to pre admission functional level  Description: INTERVENTIONS:- Perform AM-PAC 6 Click Basic Mobility/ Daily Activity assessment daily.- Set and communicate daily mobility goal to care team and patient/family/caregiver. - Collaborate with rehabilitation services on mobility goals if consulted-  Out of bed for meals 3 times a day- Out of bed for toileting- Record patient progress and toleration of activity level   Outcome: Progressing     Problem: DISCHARGE PLANNING  Goal: Discharge to home or other facility with appropriate resources  Description: INTERVENTIONS:- Identify barriers to discharge w/patient and caregiver- Arrange for needed discharge resources and transportation as appropriate- Identify discharge learning needs (meds, wound care, etc.)- Arrange for interpretive services to assist at discharge as needed- Refer to Case Management Department for coordinating discharge planning if the patient needs post-hospital services based on physician/advanced practitioner order or complex needs related to functional status, cognitive  ability, or social support system  Outcome: Progressing     Problem: Knowledge Deficit  Goal: Patient/family/caregiver demonstrates understanding of disease process, treatment plan, medications, and discharge instructions  Description: Complete learning assessment and assess knowledge base.Interventions:- Provide teaching at level of understanding- Provide teaching via preferred learning methods  Outcome: Progressing     Problem: NEUROSENSORY - ADULT  Goal: Achieves stable or improved neurological status  Description: INTERVENTIONS- Monitor and report changes in neurological status- Monitor vital signs such as temperature, blood pressure, glucose, and any other labs ordered - Initiate measures to prevent increased intracranial pressure- Monitor for seizure activity and implement precautions if appropriate    Outcome: Progressing

## 2025-05-08 NOTE — TELEPHONE ENCOUNTER
Still Admitted        5/7/2025 - present (1 day)  [facility]      Intractable migraine without aura and without status migrainosus           [facility] Discharge  The patient will need to follow-up with Dr. Genao in 2 to 3 weeks postdischarge in regards to her headache, no further imaging at this time, patient will need follow-up with ophthalmology.

## 2025-05-08 NOTE — PLAN OF CARE
Problem: PAIN - ADULT  Goal: Verbalizes/displays adequate comfort level or baseline comfort level  Description: Interventions:- Encourage patient to monitor pain and request assistance- Assess pain using appropriate pain scale- Administer analgesics based on type and severity of pain and evaluate response- Implement non-pharmacological measures as appropriate and evaluate response- Consider cultural and social influences on pain and pain management- Notify physician/advanced practitioner if interventions unsuccessful or patient reports new pain  Outcome: Progressing     Problem: INFECTION - ADULT  Goal: Absence or prevention of progression during hospitalization  Description: INTERVENTIONS:- Assess and monitor for signs and symptoms of infection- Monitor lab/diagnostic results- Monitor all insertion sites, i.e. indwelling lines, tubes, and drains- Monitor endotracheal if appropriate and nasal secretions for changes in amount and color- [place] appropriate cooling/warming therapies per order- Administer medications as ordered- Instruct and encourage patient and family to use good hand hygiene technique- Identify and instruct in appropriate isolation precautions for identified infection/condition  Outcome: Progressing     Problem: SAFETY ADULT  Goal: Patient will remain free of falls  Description: INTERVENTIONS:- Educate patient/family on patient safety including physical limitations- Instruct patient to call for assistance with activity - Consult OT/PT to assist with strengthening/mobility - Keep Call bell within reach- Keep bed low and locked with side rails adjusted as appropriate- Keep care items and personal belongings within reach- Initiate and maintain comfort rounds- Make Fall Risk Sign visible to staff- Offer Toileting every 2 Hours, in advance of need- Initiate/Maintain bed alarm- Obtain necessary fall risk management equipment: yes- Apply yellow socks and bracelet for high fall risk patients- Consider  moving patient to room near nurses station  Outcome: Progressing  Goal: Maintain or return to baseline ADL function  Description: INTERVENTIONS:-  Assess patient's ability to carry out ADLs; assess patient's baseline for ADL function and identify physical deficits which impact ability to perform ADLs (bathing, care of mouth/teeth, toileting, grooming, dressing, etc.)- Assess/evaluate cause of self-care deficits - Assess range of motion- Assess patient's mobility; develop plan if impaired- Assess patient's need for assistive devices and provide as appropriate- Encourage maximum independence but intervene and supervise when necessary- Involve family in performance of ADLs- Assess for home care needs following discharge - Consider OT consult to assist with ADL evaluation and planning for discharge- Provide patient education as appropriate  Outcome: Progressing  Goal: Maintains/Returns to pre admission functional level  Description: INTERVENTIONS:- Perform AM-PAC 6 Click Basic Mobility/ Daily Activity assessment daily.- Set and communicate daily mobility goal to care team and patient/family/caregiver. - Collaborate with rehabilitation services on mobility goals if consulted-  Out of bed for meals 3 times a day- Out of bed for toileting- Record patient progress and toleration of activity level   Outcome: Progressing     Problem: DISCHARGE PLANNING  Goal: Discharge to home or other facility with appropriate resources  Description: INTERVENTIONS:- Identify barriers to discharge w/patient and caregiver- Arrange for needed discharge resources and transportation as appropriate- Identify discharge learning needs (meds, wound care, etc.)- Arrange for interpretive services to assist at discharge as needed- Refer to Case Management Department for coordinating discharge planning if the patient needs post-hospital services based on physician/advanced practitioner order or complex needs related to functional status, cognitive  ability, or social support system  Outcome: Progressing     Problem: Knowledge Deficit  Goal: Patient/family/caregiver demonstrates understanding of disease process, treatment plan, medications, and discharge instructions  Description: Complete learning assessment and assess knowledge base.Interventions:- Provide teaching at level of understanding- Provide teaching via preferred learning methods  Outcome: Progressing     Problem: NEUROSENSORY - ADULT  Goal: Achieves stable or improved neurological status  Description: INTERVENTIONS- Monitor and report changes in neurological status- Monitor vital signs such as temperature, blood pressure, glucose, and any other labs ordered - Initiate measures to prevent increased intracranial pressure- Monitor for seizure activity and implement precautions if appropriate    Outcome: Progressing

## 2025-05-08 NOTE — ASSESSMENT & PLAN NOTE
"34 year old female with past medical history of anxiety, obesity and migraines.  The patient presented to [facility] on 5/7 with headache it is reported the patient had a migraine for over last 2 days with associated visual disturbance. The patient had a persistent migraine despite cocktail given ED. as well as CT showed evidence of possible intracranial hypertension.  The patient underwent a lumbar puncture by interventional radiology with an opening pressure of 23.9 and a closing pressure of 9.9, there was reports that the patient's headache did improve after LP.  The patient was also placed on the migraine cocktail.  Neurology was asked to evaluate.     VS -vital signs on arrival the patient was afebrile, pulse was 95 respiration was 20 blood pressure was 177/105 mmhg.     Labs/Testing:  CBC without a leukocytosis/normal.   CMP was normal  INR/APTT normal  CSF white blood cells, with 0 white blood cells no xanthochromia  CSF culture pending  CSF protein normal   CSF glucose normal  CSF no polys or bacteria seen  IR Lumbar puncture -\"Fluoroscopy confirmed good positioning within the subarachnoid space, with prompt return of cerebrospinal fluid. Opening pressure was measured at 23.9 cmH20. Closing pressure was measured at 9.9 cm H2O.\"  CT of head - \"No acute intracranial abnormality. Expanded partially empty sella as well as dilated optic nerve sheaths would support a clinical diagnosis of idiopathic intracranial hypotension in the setting of headaches.\"  MRI of brain with out contrast - \"Imaging findings that can be seen in the setting of idiopathic intracranial hypertension including distended optic nerve sheaths and partially empty sella. \"    HA - mixed - migraine/possible IIH  No evidence to support CNS infection or inflammatory process.  Does not appear to to be a Cluster type headache, SUNCT, or trigeminal neuralgia.  No evidence of venous sinus thrombosis. Mri of brain with no evidence of " acute structural lesion/mass/demyelinating process, stroke.    - CT of head -completed as above  - MRI of brain -completed as above reviewed with attending  - MRV rule out venous thrombosis (no need at this time)  - LP with CSF analysis, was completed as above with opening pressure of 23.9.  - HA cocktail initiated, by medicine team.   - Started on Diamox  - Please keep a HA diary  - Ophthalmology consultation/evaluation.  - Follow up with HA clinic as out patient  -Reviewed with attending plan of care per attending physician, please see attestation for further details and recommendations and further plan of care.

## 2025-05-08 NOTE — DISCHARGE INSTR - AVS FIRST PAGE
Follow ups:  -PCP  -Neurology in 8-10 weeks (referral placed)  -Ophthalmology (referral placed)    New prescriptions:  -Diamox 500mg twice daily  -Neurology will send script for decadron medication    Other instructions:  -Encourage limiting loud noises, bright lights  -Encourage adequate oral hydration

## 2025-05-08 NOTE — CONSULTS
"Consultation - Neurology   Name: Lena Coates 34 y.o. female I MRN: [id number]  Unit/Bed#: [address] [place] [address] I Date of Admission: 5/7/2025   Date of Service: 5/8/2025 I Hospital Day: 0   Inpatient consult to Neurology  Consult performed by: Avtar Argueta PA-C  Consult ordered by: Hayden Barfield DO      Inpatient consult to Neurology  Consult performed by: Avtar Argueta PA-C  Consult ordered by: Belgica Yuan PA-C        Physician Requesting Evaluation: Brendon Siu MD   Reason for Evaluation / Principal Problem: Headache    Assessment & Plan  Intractable migraine without aura and without status migrainosus  34 year old female with past medical history of anxiety, obesity and migraines.  The patient presented to [facility] on 5/7 with headache it is reported the patient had a migraine for over last 2 days with associated visual disturbance. The patient had a persistent migraine despite cocktail given ED. as well as CT showed evidence of possible intracranial hypertension.  The patient underwent a lumbar puncture by interventional radiology with an opening pressure of 23.9 and a closing pressure of 9.9, there was reports that the patient's headache did improve after LP.  The patient was also placed on the migraine cocktail.  Neurology was asked to evaluate.     VS -vital signs on arrival the patient was afebrile, pulse was 95 respiration was 20 blood pressure was 177/105 mmhg.     Labs/Testing:  CBC without a leukocytosis/normal.   CMP was normal  INR/APTT normal  CSF white blood cells, with 0 white blood cells no xanthochromia  CSF culture pending  CSF protein normal   CSF glucose normal  CSF no polys or bacteria seen  IR Lumbar puncture -\"Fluoroscopy confirmed good positioning within the subarachnoid space, with prompt return of cerebrospinal fluid. Opening pressure was measured at 23.9 cmH20. Closing pressure was measured at 9.9 cm H2O.\"  CT of head - \"No acute " "intracranial abnormality. Expanded partially empty sella as well as dilated optic nerve sheaths would support a clinical diagnosis of idiopathic intracranial hypotension in the setting of headaches.\"  MRI of brain with out contrast - \"Imaging findings that can be seen in the setting of idiopathic intracranial hypertension including distended optic nerve sheaths and partially empty sella. \"    HA - mixed - migraine/possible IIH  No evidence to support CNS infection or inflammatory process.  Does not appear to to be a Cluster type headache, SUNCT, or trigeminal neuralgia.  No evidence of venous sinus thrombosis. Mri of brain with no evidence of acute structural lesion/mass/demyelinating process, stroke.    - CT of head -completed as above  - MRI of brain -completed as above reviewed with attending  - MRV rule out venous thrombosis (no need at this time)  - LP with CSF analysis, was completed as above with opening pressure of 23.9.  - HA cocktail initiated, by medicine team.   - Started on Diamox  - Please keep a HA diary  - Ophthalmology consultation/evaluation.  - Follow up with HA clinic as out patient  -Reviewed with attending plan of care per attending physician, please see attestation for further details and recommendations and further plan of care.  Intracranial hypertension  Please see above for details.     The patient will need to follow-up with Dr. Genao in 2 to 3 weeks postdischarge in regards to her headache, no further imaging at this time, patient will need follow-up with ophthalmology.     History of Present Illness   Lena Coates is a 34 y.o. female with past medical history of anxiety, obesity and migraines.  The patient presented to [facility] on 5/7 with headache is reported the patient had a migraine for over last 2 days with associated visual disturbance.  The patient had a persistent migraine despite cocktail given ED. as well as CT showed evidence of possible intracranial " "hypertension.  The patient underwent a lumbar puncture by interventional radiology with an opening pressure of 23.9 and a closing pressure of 9.9, there was reports that the patient's headache did improve after LP.  The patient was also placed on the migraine cocktail.    Patient denied any other focal neurological symptoms associated with including facial droop, problem speech, one-sided weakness, one-sided numbness, or ataxia.    The patient reports she has two types of headaches, most appeared to migraines but one worse than the other.  She reports in 2017, she started to develop headaches, she reported holocephalic ha, with diffuse body weakness, no light or sound sensitivity, no n/v, not as severe as her others she will take and Advil, caffeine and sleep and these will eventually subside fairly quickly.  She will only have a hand full of these a year.  She reports more recently in the last year, it appeared it occurred when she was in a stressful situation, starting in June, again occurring in March and more severe ha, as if \"screwdriver going to her head\", one sided mostly on the left with left eye blurring, n/v, light and sound sensitivity, Advil and Caffeine would help but these would linger for a week.  She never had other focal neurological symptoms with these.  She thought these were related to stress, as they always occurred when she was stressed. She reports she has been sick and is on Augmenting for diverticulosis. She developed a severe headache last Wednesday and it has persisted, severe pain,left side with blurring of vision on the left, n/v, light and sound sensitivity.  It did not albeit with caffeine and advil.  She reports before the migraine cocktail it was severe, no a 4/10, cocktail helping.  She denies any neck stiffness, fever or chills, worst ha of lift, no cancer hx, she denies any loss of vision, temporal pain, problems with speech or confusion, no one sided weakness or numbness or " "ataxia, she is able to walk with no difficulties.  Reviewed Lp findings with patient, she does not have a post lp ha symptoms.    VS -vital signs on arrival the patient was afebrile, pulse was 95 respiration was 20 blood pressure was 177/105 mmhg.     Labs/Testing:  CBC without a leukocytosis/normal.   CMP was normal  INR/APTT normal  CSF white blood cells, with 0 white blood cells no xanthochromia  CSF culture pending  CSF protein normal   CSF glucose normal  CSF no polys or bacteria seen  IR Lumbar puncture -\"Fluoroscopy confirmed good positioning within the subarachnoid space, with prompt return of cerebrospinal fluid. Opening pressure was measured at 23.9 cmH20. Closing pressure was measured at 9.9 cm H2O.\"  CT of head - \"No acute intracranial abnormality. Expanded partially empty sella as well as dilated optic nerve sheaths would support a clinical diagnosis of idiopathic intracranial hypotension in the setting of headaches.\"  MRI of brain with out contrast - \"Imaging findings that can be seen in the setting of idiopathic intracranial hypertension including distended optic nerve sheaths and partially empty sella. \"    Review of Systems   12 point ROS as per HPI, otherwise negative.     Historical Information   Past Medical History:   Diagnosis Date    Abnormal weight gain     Anemia 2017    during pregnancy    Candidiasis, mouth     Cellulitis of right arm     Diverticulitis of colon     Kidney infection     Lyme disease     Pre-employment health screening examination 2018    SIRS (systemic inflammatory response syndrome) (HCC)     Tinea corporis      Past Surgical History:   Procedure Laterality Date    BREAST BIOPSY       SECTION      FOOT SURGERY      HYSTERECTOMY  2024    Uterus and cervix    IR LUMBAR PUNCTURE  2025    CT CYSTOURETHROSCOPY N/A 2024    Procedure: CYSTOSCOPY;  Surgeon: Keyla Peter MD;  Location: [facility];  Service: Gynecology    CT LAPS TOTAL " HYSTERECT 250 GM/< W/RMVL TUBE/OVARY Bilateral 12/18/2024    Procedure: HYSTERECTOMY LAPAROSCOPIC TOTAL (LTH) WITH RIGHT PARTIAL SALPINGECTOMY, LYSIS OF ADHESIONS, EXAM UNDER ANESTHESIA;  Surgeon: Keyla Peter MD;  Location: [facility];  Service: Gynecology    SALPINGECTOMY Bilateral     TUBAL LIGATION  4/19/2021    Tubes removed    US GUIDED BREAST BIOPSY RIGHT COMPLETE Right 11/18/2021     Social History     Tobacco Use    Smoking status: Never     Passive exposure: Never    Smokeless tobacco: Never   Vaping Use    Vaping status: Never Used   Substance and Sexual Activity    Alcohol use: Not Currently     Comment: social    Drug use: No    Sexual activity: Not Currently     Partners: Male     Birth control/protection: Female Sterilization     E-Cigarette/Vaping    E-Cigarette Use Never User      E-Cigarette/Vaping Substances    Nicotine No     THC No     CBD No     Flavoring No     Other No     Unknown No      Family History   Problem Relation Age of Onset    Diabetes Mother     Atrial fibrillation Mother     No Known Problems Father     Diabetes Maternal Grandmother     Cancer Maternal Grandfather     Cancer Paternal Grandfather     Depression Half-Sister     Learning disabilities Half-Brother     Auditory processing disorder Half-Brother     Breast cancer Other      Social History     Tobacco Use    Smoking status: Never     Passive exposure: Never    Smokeless tobacco: Never   Vaping Use    Vaping status: Never Used   Substance and Sexual Activity    Alcohol use: Not Currently     Comment: social    Drug use: No    Sexual activity: Not Currently     Partners: Male     Birth control/protection: Female Sterilization       Current Facility-Administered Medications:     acetaminophen (TYLENOL) tablet 650 mg, Q6H PRN    acetaZOLAMIDE (DIAMOX) tablet 500 mg, Q12H ROSE    diphenhydrAMINE (BENADRYL) injection 25 mg, Q8H ROSE    ketorolac (TORADOL) injection 30 mg, Q8H ROSE    magnesium sulfate 2 g/50 mL IVPB  (premix) 2 g, Q24H, Last Rate: 2 g (05/07/25 1714)    metoclopramide (REGLAN) injection 10 mg, Q8H ROSE    SUMAtriptan (IMITREX) subcutaneous injection 6 mg, Q1H PRN  Prior to Admission Medications   Prescriptions Last Dose Informant Patient Reported? Taking?   SUMAtriptan (IMITREX) 100 mg tablet 5/7/2025 at  6:30 AM  No Yes   Sig: Take 1 tablet (100 mg total) by mouth once as needed for migraine (may repeat in 4 hours, max 200 mg in 24 hours) may repeat in 2 hours if necessary. Max dose 200mg in 24 hour period.   ondansetron (ZOFRAN) 4 mg tablet Not Taking Self No No   Sig: Take 1 tablet (4 mg total) by mouth every 6 (six) hours as needed for nausea or vomiting   Patient not taking: Reported on 5/7/2025   polyethylene glycol (Golytely) 4000 mL solution   No No   Sig: Take 4,000 mL by mouth once for 1 dose Take according to instructions given by the office for colonoscopy bowel prep.      Facility-Administered Medications Last Administration Doses Remaining   ketorolac (TORADOL) 60 mg/2 mL IM injection 60 mg 5/6/2025  3:41 PM 0        Patient has no known allergies.    Objective :  Temp:  [97.6 °F (36.4 °C)-98.1 °F (36.7 °C)] 98 °F (36.7 °C)  HR:  [52-95] 52  BP: (117-177)/() 134/76  Resp:  [18-20] 20  SpO2:  [95 %-100 %] 95 %  O2 Device: None (Room air)    Physical Exam  Vitals reviewed.   Constitutional:       General: She is not in acute distress.     Appearance: Normal appearance. She is not toxic-appearing.   HENT:      Head: Normocephalic and atraumatic.      Nose: No congestion.   Eyes:      General: Lids are normal.         Right eye: No discharge.         Left eye: No discharge.      Extraocular Movements: Extraocular movements intact.      Pupils: Pupils are equal, round, and reactive to light.   Cardiovascular:      Rate and Rhythm: Normal rate.   Pulmonary:      Effort: No respiratory distress.   Abdominal:      General: There is no distension.   Musculoskeletal:         General: No swelling.       Cervical back: Neck supple.   Skin:     General: Skin is warm and dry.   Neurological:      Motor: Motor strength is normal.  Psychiatric:         Speech: Speech normal.     Neurological Exam  Mental Status  Awake, alert and oriented to person, place and time. Speech is normal. Language is fluent with no aphasia. Attention and concentration are normal. Fund of knowledge is appropriate for level of education. Apraxia absent.    Cranial Nerves  CN II: Visual acuity is normal. Visual fields full to confrontation.  CN III, IV, VI: Extraocular movements intact bilaterally. Normal lids and orbits bilaterally. Pupils equal round and reactive to light bilaterally.  CN V: Facial sensation is normal.  CN VII: Full and symmetric facial movement.  CN VIII: Hearing is normal.  CN IX, X: Palate elevates symmetrically. Normal gag reflex.  CN XI: Shoulder shrug strength is normal.  CN XII: Tongue midline without atrophy or fasciculations.    Motor  Normal muscle bulk throughout. No fasciculations present. Normal muscle tone. No abnormal involuntary movements. Strength is 5/5 throughout all four extremities.    Sensory  Sensation is intact to light touch, pinprick, vibration and proprioception in all four extremities.  No neglect noted, no focal to temperature as well. .    Reflexes  1-2 in the uppers and lowers, symmetric. .    Coordination  Right: Finger-to-nose normal. Rapid alternating movement normal. Heel-to-shin normal.    Gait    Not testing at this time.      Lab Results: I have reviewed the following results:I have personally reviewed pertinent reports.    Recent Labs     05/08/25  0540   WBC 6.91   HGB 13.1   HCT 40.6      SODIUM 137   K 4.0      CO2 21   BUN 15   CREATININE 0.73   GLUC 94     Recent Results (from the past 24 hours)   CBC and differential    Collection Time: 05/07/25 11:54 AM   Result Value Ref Range    WBC 8.38 4.31 - 10.16 Thousand/uL    RBC 5.08 3.81 - 5.12 Million/uL    Hemoglobin 14.7  11.5 - 15.4 g/dL    Hematocrit 44.9 34.8 - 46.1 %    MCV 88 82 - 98 fL    MCH 28.9 26.8 - 34.3 pg    MCHC 32.7 31.4 - 37.4 g/dL    RDW 13.1 11.6 - 15.1 %    MPV 10.2 8.9 - 12.7 fL    Platelets 346 149 - 390 Thousands/uL    nRBC 0 /100 WBCs    Segmented % 48 43 - 75 %    Immature Grans % 0 0 - 2 %    Lymphocytes % 45 (H) 14 - 44 %    Monocytes % 4 4 - 12 %    Eosinophils Relative 2 0 - 6 %    Basophils Relative 1 0 - 1 %    Absolute Neutrophils 4.06 1.85 - 7.62 Thousands/µL    Absolute Immature Grans 0.02 0.00 - 0.20 Thousand/uL    Absolute Lymphocytes 3.73 0.60 - 4.47 Thousands/µL    Absolute Monocytes 0.36 0.17 - 1.22 Thousand/µL    Eosinophils Absolute 0.16 0.00 - 0.61 Thousand/µL    Basophils Absolute 0.05 0.00 - 0.10 Thousands/µL   Comprehensive metabolic panel    Collection Time: 05/07/25 11:54 AM   Result Value Ref Range    Sodium 136 135 - 147 mmol/L    Potassium 3.9 3.5 - 5.3 mmol/L    Chloride 103 96 - 108 mmol/L    CO2 23 21 - 32 mmol/L    ANION GAP 10 4 - 13 mmol/L    BUN 17 5 - 25 mg/dL    Creatinine 0.70 0.60 - 1.30 mg/dL    Glucose 85 65 - 140 mg/dL    Calcium 9.6 8.4 - 10.2 mg/dL    AST 18 13 - 39 U/L    ALT 24 7 - 52 U/L    Alkaline Phosphatase 49 34 - 104 U/L    Total Protein 7.5 6.4 - 8.4 g/dL    Albumin 4.1 3.5 - 5.0 g/dL    Total Bilirubin 0.40 0.20 - 1.00 mg/dL    eGFR 113 ml/min/1.73sq m   Protime-INR    Collection Time: 05/07/25  2:23 PM   Result Value Ref Range    Protime 13.6 12.3 - 15.0 seconds    INR 0.99 0.85 - 1.19   APTT    Collection Time: 05/07/25  2:23 PM   Result Value Ref Range    PTT 27 23 - 34 seconds   CSF white cell count with differential    Collection Time: 05/07/25  3:21 PM   Result Value Ref Range    Tube Number, CSF 4     WBC, CSF 0 0 - 5 /uL    Xanthochromia No No    Appearance, CSF Clear    Glucose, CSF    Collection Time: 05/07/25  3:21 PM   Result Value Ref Range    Glucose, CSF 52 40 - 70 mg/dL   CSF Total Protein    Collection Time: 05/07/25  3:21 PM   Result Value  Ref Range    Protein, CSF 29 15 - 45 mg/dL   STAT Gram Stain    Collection Time: 05/07/25  3:21 PM    Specimen: Lumbar Puncture; Other   Result Value Ref Range    Gram Stain Result No No Polys or Bacteria seen    ECG 12 lead    Collection Time: 05/08/25  5:38 AM   Result Value Ref Range    Ventricular Rate 63 BPM    Atrial Rate 63 BPM    NJ Interval 172 ms    QRSD Interval 78 ms    QT Interval 400 ms    QTC Interval 410 ms    P [place] 18 degrees    QRS Axis 35 degrees    T Wave Axis 17 degrees   Basic metabolic panel    Collection Time: 05/08/25  5:40 AM   Result Value Ref Range    Sodium 137 135 - 147 mmol/L    Potassium 4.0 3.5 - 5.3 mmol/L    Chloride 108 96 - 108 mmol/L    CO2 21 21 - 32 mmol/L    ANION GAP 8 4 - 13 mmol/L    BUN 15 5 - 25 mg/dL    Creatinine 0.73 0.60 - 1.30 mg/dL    Glucose 94 65 - 140 mg/dL    Calcium 8.7 8.4 - 10.2 mg/dL    eGFR 107 ml/min/1.73sq m   CBC (With Platelets)    Collection Time: 05/08/25  5:40 AM   Result Value Ref Range    WBC 6.91 4.31 - 10.16 Thousand/uL    RBC 4.50 3.81 - 5.12 Million/uL    Hemoglobin 13.1 11.5 - 15.4 g/dL    Hematocrit 40.6 34.8 - 46.1 %    MCV 90 82 - 98 fL    MCH 29.1 26.8 - 34.3 pg    MCHC 32.3 31.4 - 37.4 g/dL    RDW 13.3 11.6 - 15.1 %    Platelets 291 149 - 390 Thousands/uL    MPV 10.1 8.9 - 12.7 fL     Procedure: MRI brain wo contrast  Result Date: 5/7/2025  Narrative: MRI BRAIN WITHOUT CONTRAST INDICATION: headache. COMPARISON:   None. TECHNIQUE:  Multiplanar, multisequence imaging of the brain was performed. IMAGE QUALITY:  Diagnostic. FINDINGS: BRAIN PARENCHYMA:  There is no discrete mass, mass effect or midline shift. Susceptibility focus in the dorsal vadim likely reflects capillary telangiectasia. There is no acute intracranial hemorrhage.  There is no evidence of acute infarction and diffusion imaging is unremarkable.  There are no white matter changes in the cerebral hemispheres. VENTRICLES:  Normal for the patient's age. SELLA AND PITUITARY  GLAND: Partially empty sella ORBITS: The optic nerve sheaths are distended with flattening of the posterior sclera. PARANASAL SINUSES:  Normal. VASCULATURE:  Evaluation of the major intracranial vasculature demonstrates appropriate flow voids. CALVARIUM AND SKULL BASE:  Normal. EXTRACRANIAL SOFT TISSUES:  Normal.     Impression: Imaging findings that can be seen in the setting of idiopathic intracranial hypertension including distended optic nerve sheaths and partially empty sella. Workstation performed: [id number]     Procedure: IR lumbar puncture  Result Date: 5/7/2025  Narrative: PROCEDURE: Fluoroscopic-guided diagnostic lumbar puncture STAFF: Garcia Barker M.D. CONTRAST: None. FLUOROSCOPY TIME: 0.4 minutes. NUMBER OF IMAGES: Multiple COMPLICATIONS: None. MEDICATIONS: 1% lidocaine subcutaneous. INDICATION: 34-year-old female with headache and blurry vision PROCEDURE: A suitable location for puncture was identified with fluoroscopy in the prone position at the L2-3 level. A 20-gauge spinal needle was advanced into the subarachnoid space using fluoroscopic guidance.  An opening pressure was measured. 24 mL's of clear cerebrospinal fluid was then removed and sent for analysis.  The needle was removed and the puncture site was covered with a sterile dressing. FINDINGS: 1.  Fluoroscopy confirmed good positioning within the subarachnoid space, with prompt return of cerebrospinal fluid. 2.  Opening pressure was measured at 23.9 cmH20. Closing pressure was measured at 9.9 cm H2O     Impression: Fluoroscopic-guided diagnostic lumbar puncture Workstation performed: [id number]     Procedure: CT head without contrast  Addendum Date: 5/7/2025  Addendum: ADDENDUM: Impression should read: Expanded partially empty sella as well as dilated optic nerve sheaths would support a clinical diagnosis of idiopathic intracranial hypertension in the setting of headaches.     Result Date: 5/7/2025  Narrative: CT BRAIN - WITHOUT CONTRAST  INDICATION:   migraine headache. COMPARISON:  None. TECHNIQUE:  CT examination of the brain was performed.  Multiplanar 2D reformatted images were created from the source data. Radiation dose length product (DLP) for this visit:  848.36 mGy-cm. .  This examination, like all CT scans performed in the [facility], was performed utilizing techniques to minimize radiation dose exposure, including the use of iterative reconstruction and automated exposure control. IMAGE QUALITY:  Diagnostic. FINDINGS: PARENCHYMA:No intracranial mass, mass effect or midline shift. No CT signs of acute infarction.  No acute parenchymal hemorrhage. Partially empty sella VENTRICLES AND EXTRA-AXIAL SPACES:  Normal for the patient's age. VISUALIZED ORBITS: Normal visualized orbits. Tortuous optic nerves with dilated nerve sheaths. PARANASAL SINUSES: Normal visualized paranasal sinuses. CALVARIUM AND EXTRACRANIAL SOFT TISSUES: Normal.     Impression: No acute intracranial abnormality. Expanded partially empty sella as well as dilated optic nerve sheaths would support a clinical diagnosis of idiopathic intracranial hypotension in the setting of headaches. The study was marked in EPIC for immediate notification. Workstation performed: [id number]      Reviewed all imaging and testing completed, above with Attending.     Reviewed case with neurology attending, history and physical examination, labs and imaging completed, plan of care as per attending physician.  Please see attestation for further details.

## 2025-05-08 NOTE — ASSESSMENT & PLAN NOTE
Patient presents to the ED for migraine over the last 2 days with associated visual disturbance. Denies any dizziness, fevers, chills, weakness, numbness/tingling.   CT head: No acute intracranial abnormality. Expanded partially empty sella as well as dilated optic nerve sheaths would support a clinical diagnosis of idiopathic intracranial hypotension in the setting of headaches.  MRI brain: Imaging findings that can be seen in the setting of idiopathic intracranial hypertension including distended optic nerve sheaths and partially empty sella   S/p LP per IR with opening pressure 23.9  CSF white blood cells, with 0 white blood cells no xanthochromia  CSF culture no growth  CSF protein normal   CSF glucose normal  CSF no polys or bacteria seen  Supportive care with migraine cocktail medications Q8H, dim lights, close door to limit harsh sounds  Neuro checks  Referral for Ophthalmology evaluation at discharge  Consult to Neurology, recommendations are appreciated. Continue Diamox 500mg BID at discharge. Follow up in office in 8-10 weeks.

## 2025-05-08 NOTE — NURSING NOTE
AVS reviewed with patient and family, all questions answered to satisfaction. Patient left walking with family.

## 2025-05-08 NOTE — ASSESSMENT & PLAN NOTE
Patient presents to the ED for migraine over the last 2 days with associated visual disturbance. Denies any dizziness, fevers, chills, weakness, numbness/tingling.   CT head: No acute intracranial abnormality. Expanded partially empty sella as well as dilated optic nerve sheaths would support a clinical diagnosis of idiopathic intracranial hypotension in the setting of headaches.  MRI brain: Imaging findings that can be seen in the setting of idiopathic intracranial hypertension including distended optic nerve sheaths and partially empty sella   S/p LP per IR with opening pressure 23.9  CSF white blood cells, with 0 white blood cells no xanthochromia  CSF culture pending  CSF protein normal   CSF glucose normal  CSF no polys or bacteria seen  Continue migraine cocktail medications Q8H, dim lights, close door to limit harsh sounds  Continue Diamox 500mg BID  Neuro checks  Referral for Ophthalmology evaluation at discharge  Consult to Neurology, recommendations are appreciated

## 2025-05-08 NOTE — UTILIZATION REVIEW
Initial Clinical Review    Admission: Date/Time/Statement:   Admission Orders (From admission, onward)       Ordered        05/07/25 1610  Place in Observation  Once                          Orders Placed This Encounter   Procedures    Place in Observation     Standing Status:   Standing     Number of Occurrences:   1     Level of Care:   Med Surg [16]     ED Arrival Information       Expected   -    Arrival   5/7/2025 11:17    Acuity   Urgent              Means of arrival   Walk-In    Escorted by   Self    Service   Hospitalist    Admission type   Emergency              Arrival complaint   Migraine             Chief Complaint   Patient presents with    Headache     C/o headache for a week,seen primary yesterday, given meds with no relief. Primary referred pt here       Initial Presentation:   34 yof to ER from home for migraine headache over the last 2 days went to her doctor yesterday he gave her Toradol which did relieve the pain however shortly after her returning to her house it came back. States left side of her head does have some visual disturbances in the left eye some nausea and vomiting states this is the same area as her prior migraines however this 1 seems to be a little more severe and refractory. Presents hypertensive with s/s as above. Admission CT head: Expanded partially empty sella as well as dilated optic nerve sheaths would support a clinical diagnosis of idiopathic intracranial hypotension in the setting of headaches. MRI brain: Imaging findings that can be seen in the setting of idiopathic intracranial hypertension including distended optic nerve sheaths and partially empty sella.   Placed in observation status for intractable migraine headache. Plan: IV migraine cocktail, LP, consult neuro.    5/8/25- observation:  Reports mild migraine today now traveling to behind her ear. Visual changes resolved. Continue migraine cocktail medications Q8H, dim lights, close door to limit harsh sounds.  Continue Diamox, Neuro checks. Neuro following.  Per neuro: headache  HA - mixed - migraine/possible IIH  No evidence to support CNS infection or inflammatory process.  Does not appear to to be a Cluster type headache, SUNCT, or trigeminal neuralgia.  No evidence of venous sinus thrombosis. Mri of brain with no evidence of acute structural lesion/mass/demyelinating process, stroke.  - LP with CSF analysis, was completed as above with opening pressure of 23.9.  - HA cocktail initiated, by medicine team.   - Started on Diamox  - Please keep a HA diary  - Ophthalmology consultation/evaluation.      ED Treatment-Medication Administration from 05/07/2025 1117 to 05/07/2025 1644         Date/Time Order Dose Route Action     05/07/2025 1156 ketorolac (TORADOL) injection 15 mg 15 mg Intravenous Given     05/07/2025 1158 diphenhydrAMINE (BENADRYL) injection 25 mg 25 mg Intravenous Given     05/07/2025 1201 metoclopramide (REGLAN) injection 10 mg 10 mg Intravenous Given     05/07/2025 1204 magnesium sulfate IVPB (premix) SOLN 1 g 1 g Intravenous New Bag     05/07/2025 1155 sodium chloride 0.9 % bolus 1,000 mL 1,000 mL Intravenous New Bag     05/07/2025 1514 lidocaine 1% buffered 4 mL Infiltration Given            Scheduled Medications:  acetaZOLAMIDE, 500 mg, Oral, Q12H ROSE  diphenhydrAMINE, 25 mg, Intravenous, Q8H ROSE  ketorolac, 30 mg, Intravenous, Q8H ROSE  magnesium sulfate, 2 g, Intravenous, Q24H  metoclopramide, 10 mg, Intravenous, Q8H ROSE      Continuous IV Infusions:     PRN Meds:  acetaminophen, 650 mg, Oral, Q6H PRN  ondansetron, 4 mg, Intravenous, Q6H PRN  SUMAtriptan, 6 mg, Subcutaneous, Q1H PRN      ED Triage Vitals [05/07/25 1125]   Temperature Pulse Respirations Blood Pressure SpO2 Pain Score   97.7 °F (36.5 °C) 95 20 (!) 177/105 98 % 8     Weight (last 2 days)       Date/Time Weight    05/07/25 1728 105 (230.7)            Vital Signs (last 3 days)       Date/Time Temp Pulse Resp BP MAP (mmHg) SpO2 O2 Device  Patient Position - Orthostatic VS Pain    05/08/25 0817 -- -- -- -- -- -- -- -- 7 05/08/25 0800 -- -- -- -- -- -- -- -- 7    05/08/25 07:48:38 98.1 °F (36.7 °C) 70 16 107/61 76 95 % -- -- --    05/08/25 0200 98 °F (36.7 °C) -- 20 134/76 92 -- -- -- --    05/08/25 0023 -- -- -- -- -- -- -- -- Med Not Given for Pain - for MAR use only    05/07/25 22:03:56 98.1 °F (36.7 °C) 52 20 130/72 91 95 % -- -- --    05/07/25 2100 -- -- -- -- -- -- None (Room air) -- --    05/07/25 19:59:14 97.7 °F (36.5 °C) 62 19 117/66 83 95 % -- -- --    05/07/25 1728 97.6 °F (36.4 °C) 62 18 134/77 96 100 % -- -- --    05/07/25 1713 -- -- -- -- -- -- -- -- 7    05/07/25 1653 97.6 °F (36.4 °C) 63 -- 134/77 96 100 % None (Room air) Lying 6    05/07/25 16:52:35 97.6 °F (36.4 °C) 62 18 134/77 96 100 % -- -- --    05/07/25 1615 -- 67 -- 128/73 96 98 % -- -- --    05/07/25 1600 -- 71 -- 129/72 94 97 % -- -- --    05/07/25 1430 -- 69 18 122/70 -- 99 % None (Room air) Sitting --    05/07/25 1426 -- -- -- -- -- -- -- -- 8    05/07/25 1156 -- -- -- -- -- -- -- -- 9    05/07/25 1147 -- -- -- -- -- -- None (Room air) -- --    05/07/25 1125 97.7 °F (36.5 °C) 95 20 177/105 -- 98 % None (Room air) Sitting 8              Pertinent Labs/Diagnostic Test Results:   Radiology:  MRI brain wo contrast   Final Interpretation by Noman Eaton MD (05/07 1939)      Imaging findings that can be seen in the setting of idiopathic intracranial hypertension including distended optic nerve sheaths and partially empty sella.      Workstation performed: [id number]         IR lumbar puncture   Final Interpretation by Garcia Barker MD (05/07 7077)   Fluoroscopic-guided diagnostic lumbar puncture      Workstation performed: [id number]         CT head without contrast   Final Interpretation by Abigail Vogel MD (05/07 1552)   Addendum (preliminary) 1 of 1 by Abigail Vogel MD (05/07 1552)   ADDENDUM:   Impression should read:      Expanded partially empty sella as  "well as dilated optic nerve sheaths    would support a clinical diagnosis of idiopathic intracranial hypertension    in the setting of headaches.            Final      No acute intracranial abnormality.      Expanded partially empty sella as well as dilated optic nerve sheaths would support a clinical diagnosis of idiopathic intracranial hypotension in the setting of headaches.      The study was marked in EPIC for immediate notification.                  Workstation performed: [id number]           Cardiology:  ECG 12 lead   Final Result by Mikie Coleman MD (05/08 0853)   Normal sinus rhythm   Low voltage QRS   Nonspecific ST abnormality   Abnormal ECG   When compared with ECG of 07-May-2025 17:07, (unconfirmed)   No significant change was found   Confirmed by Mikie Coleman ([id number]) on 5/8/2025 8:53:36 AM      ECG 12 lead   Final Result by Mikie Coleman MD (05/08 0851)   Sinus bradycardia   Low voltage QRS   Nonspecific ST abnormality   Abnormal ECG      Confirmed by Mikie Coleman ([id number]) on 5/8/2025 8:51:11 AM        GI:  No orders to display           Results from last 7 days   Lab Units 05/08/25  0540 05/07/25  1154   WBC Thousand/uL 6.91 8.38   HEMOGLOBIN g/dL 13.1 14.7   HEMATOCRIT % 40.6 44.9   PLATELETS Thousands/uL 291 346   TOTAL NEUT ABS Thousands/µL  --  4.06         Results from last 7 days   Lab Units 05/08/25  0540 05/07/25  1154   SODIUM mmol/L 137 136   POTASSIUM mmol/L 4.0 3.9   CHLORIDE mmol/L 108 103   CO2 mmol/L 21 23   ANION GAP mmol/L 8 10   BUN mg/dL 15 17   CREATININE mg/dL 0.73 0.70   EGFR ml/min/1.73sq m 107 113   CALCIUM mg/dL 8.7 9.6     Results from last 7 days   Lab Units 05/07/25  1154   AST U/L 18   ALT U/L 24   ALK PHOS U/L 49   TOTAL PROTEIN g/dL 7.5   ALBUMIN g/dL 4.1   TOTAL BILIRUBIN mg/dL 0.40         Results from last 7 days   Lab Units 05/08/25  0540 05/07/25  1154   GLUCOSE RANDOM mg/dL 94 85             No results found for: \"BETA-HYDROXYBUTYRATE\"                 "           Results from last 7 days   Lab Units 05/07/25  1423   PROTIME seconds 13.6   INR  0.99   PTT seconds 27                                                                                             Results from last 7 days   Lab Units 05/07/25  1521   GRAM STAIN RESULT  No No Polys or Bacteria seen         Results from last 7 days   Lab Units 05/07/25  1521   APPEARANCE CSF  Clear   TUBE NUM CSF  4   WBC CSF /uL 0   XANTHOCHROMIA  No   GLUCOSE CSF mg/dL 52   PROTEIN CSF mg/dL 29   CSF CULTURE  No growth           Past Medical History:   Diagnosis Date    Abnormal weight gain     Anemia 2017    during pregnancy    Candidiasis, mouth     Cellulitis of right arm     Diverticulitis of colon     Kidney infection     Lyme disease     Pre-employment health screening examination 07/24/2018    SIRS (systemic inflammatory response syndrome) (HCC)     Tinea corporis      Present on Admission:   Intractable migraine without aura and without status migrainosus   Anxiety      Admitting Diagnosis: Migraine headache [G43.909]  Intracranial hypertension [G93.2]  Intractable migraine without aura and without status migrainosus [G43.019]  Headache [R51.9]  Age/Sex: 34 y.o. female    Network Utilization Review Department  ATTENTION: Please call with any questions or concerns to [phone] and carefully listen to the prompts so that you are directed to the right person. All voicemails are confidential.   For Discharge needs, contact Care Management DC Support Team at [phone] opt. 2  Send all requests for admission clinical reviews, approved or denied determinations and any other requests to dedicated fax number below belonging to the campus where the patient is receiving treatment. List of dedicated fax numbers for the Facilities:  FACILITY NAME UR FAX NUMBER   ADMISSION DENIALS (Administrative/Medical Necessity) [phone]   DISCHARGE SUPPORT TEAM (NETWORK) [phone]   PARENT CHILD HEALTH (Maternity/NICU/Pediatrics)  [phone]   [facility] [phone]   [facility] [phone]   [facility] [phone]   [facility] [phone]   Critical access hospital [phone]   [facility] [phone]   [facility] [phone]   [facility] [phone]   [facility] [phone]   [facility] [phone]   [facility] [phone]   [facility] [phone]

## 2025-05-09 ENCOUNTER — TRANSITIONAL CARE MANAGEMENT (OUTPATIENT)
Dept: FAMILY MEDICINE CLINIC | Facility: CLINIC | Age: 35
End: 2025-05-09

## 2025-05-10 ENCOUNTER — HOSPITAL ENCOUNTER (EMERGENCY)
Facility: HOSPITAL | Age: 35
Discharge: HOME | End: 2025-05-10
Attending: EMERGENCY MEDICINE
Payer: COMMERCIAL

## 2025-05-10 VITALS
OXYGEN SATURATION: 99 % | TEMPERATURE: 98.7 F | RESPIRATION RATE: 20 BRPM | DIASTOLIC BLOOD PRESSURE: 57 MMHG | HEART RATE: 70 BPM | SYSTOLIC BLOOD PRESSURE: 112 MMHG

## 2025-05-10 DIAGNOSIS — R51.9 HEADACHE: Primary | ICD-10-CM

## 2025-05-10 LAB
ANION GAP SERPL CALCULATED.3IONS-SCNC: 11 MMOL/L (ref 4–13)
BASOPHILS # BLD AUTO: 0.02 THOUSANDS/ÂΜL (ref 0–0.1)
BASOPHILS NFR BLD AUTO: 0 % (ref 0–1)
BUN SERPL-MCNC: 16 MG/DL (ref 5–25)
CALCIUM SERPL-MCNC: 9.8 MG/DL (ref 8.4–10.2)
CHLORIDE SERPL-SCNC: 108 MMOL/L (ref 96–108)
CO2 SERPL-SCNC: 18 MMOL/L (ref 21–32)
CREAT SERPL-MCNC: 0.78 MG/DL (ref 0.6–1.3)
EOSINOPHIL # BLD AUTO: 0.04 THOUSAND/ÂΜL (ref 0–0.61)
EOSINOPHIL NFR BLD AUTO: 0 % (ref 0–6)
ERYTHROCYTE [DISTWIDTH] IN BLOOD BY AUTOMATED COUNT: 13.2 % (ref 11.6–15.1)
GFR SERPL CREATININE-BSD FRML MDRD: 99 ML/MIN/1.73SQ M
GLUCOSE SERPL-MCNC: 125 MG/DL (ref 65–140)
HCT VFR BLD AUTO: 45.9 % (ref 34.8–46.1)
HGB BLD-MCNC: 15.1 G/DL (ref 11.5–15.4)
IMM GRANULOCYTES # BLD AUTO: 0.06 THOUSAND/UL (ref 0–0.2)
IMM GRANULOCYTES NFR BLD AUTO: 1 % (ref 0–2)
LYMPHOCYTES # BLD AUTO: 2.22 THOUSANDS/ÂΜL (ref 0.6–4.47)
LYMPHOCYTES NFR BLD AUTO: 17 % (ref 14–44)
MAGNESIUM SERPL-MCNC: 1.8 MG/DL (ref 1.9–2.7)
MCH RBC QN AUTO: 29.1 PG (ref 26.8–34.3)
MCHC RBC AUTO-ENTMCNC: 32.9 G/DL (ref 31.4–37.4)
MCV RBC AUTO: 88 FL (ref 82–98)
MONOCYTES # BLD AUTO: 0.43 THOUSAND/ÂΜL (ref 0.17–1.22)
MONOCYTES NFR BLD AUTO: 3 % (ref 4–12)
NEUTROPHILS # BLD AUTO: 9.98 THOUSANDS/ÂΜL (ref 1.85–7.62)
NEUTS SEG NFR BLD AUTO: 79 % (ref 43–75)
NRBC BLD AUTO-RTO: 0 /100 WBCS
PLATELET # BLD AUTO: 364 THOUSANDS/UL (ref 149–390)
PMV BLD AUTO: 10 FL (ref 8.9–12.7)
POTASSIUM SERPL-SCNC: 3.9 MMOL/L (ref 3.5–5.3)
RBC # BLD AUTO: 5.19 MILLION/UL (ref 3.81–5.12)
SODIUM SERPL-SCNC: 137 MMOL/L (ref 135–147)
WBC # BLD AUTO: 12.75 THOUSAND/UL (ref 4.31–10.16)

## 2025-05-10 PROCEDURE — 99283 EMERGENCY DEPT VISIT LOW MDM: CPT

## 2025-05-10 PROCEDURE — 85025 COMPLETE CBC W/AUTO DIFF WBC: CPT | Performed by: PHYSICIAN ASSISTANT

## 2025-05-10 PROCEDURE — 96375 TX/PRO/DX INJ NEW DRUG ADDON: CPT

## 2025-05-10 PROCEDURE — 80048 BASIC METABOLIC PNL TOTAL CA: CPT | Performed by: PHYSICIAN ASSISTANT

## 2025-05-10 PROCEDURE — 96365 THER/PROPH/DIAG IV INF INIT: CPT

## 2025-05-10 PROCEDURE — 83735 ASSAY OF MAGNESIUM: CPT | Performed by: PHYSICIAN ASSISTANT

## 2025-05-10 PROCEDURE — 99284 EMERGENCY DEPT VISIT MOD MDM: CPT | Performed by: PHYSICIAN ASSISTANT

## 2025-05-10 PROCEDURE — 36415 COLL VENOUS BLD VENIPUNCTURE: CPT | Performed by: PHYSICIAN ASSISTANT

## 2025-05-10 RX ORDER — LORAZEPAM 2 MG/ML
1 INJECTION INTRAMUSCULAR ONCE
Status: COMPLETED | OUTPATIENT
Start: 2025-05-10 | End: 2025-05-10

## 2025-05-10 RX ORDER — DIPHENHYDRAMINE HYDROCHLORIDE 50 MG/ML
50 INJECTION, SOLUTION INTRAMUSCULAR; INTRAVENOUS ONCE
Status: COMPLETED | OUTPATIENT
Start: 2025-05-10 | End: 2025-05-10

## 2025-05-10 RX ORDER — BUTALBITAL, ACETAMINOPHEN AND CAFFEINE 50; 325; 40 MG/1; MG/1; MG/1
1 TABLET ORAL EVERY 6 HOURS PRN
Qty: 12 TABLET | Refills: 0 | Status: SHIPPED | OUTPATIENT
Start: 2025-05-10 | End: 2025-05-13

## 2025-05-10 RX ORDER — MAGNESIUM SULFATE HEPTAHYDRATE 40 MG/ML
2 INJECTION, SOLUTION INTRAVENOUS ONCE
Status: COMPLETED | OUTPATIENT
Start: 2025-05-10 | End: 2025-05-10

## 2025-05-10 RX ORDER — CYCLOBENZAPRINE HCL 10 MG
10 TABLET ORAL 3 TIMES DAILY PRN
Qty: 12 TABLET | Refills: 0 | Status: SHIPPED | OUTPATIENT
Start: 2025-05-10 | End: 2025-05-21

## 2025-05-10 RX ORDER — KETOROLAC TROMETHAMINE 30 MG/ML
15 INJECTION, SOLUTION INTRAMUSCULAR; INTRAVENOUS ONCE
Status: COMPLETED | OUTPATIENT
Start: 2025-05-10 | End: 2025-05-10

## 2025-05-10 RX ORDER — NAPROXEN 500 MG/1
500 TABLET ORAL 2 TIMES DAILY WITH MEALS
Qty: 14 TABLET | Refills: 0 | Status: SHIPPED | OUTPATIENT
Start: 2025-05-10 | End: 2025-05-21

## 2025-05-10 RX ORDER — METOCLOPRAMIDE HYDROCHLORIDE 5 MG/ML
10 INJECTION INTRAMUSCULAR; INTRAVENOUS ONCE
Status: COMPLETED | OUTPATIENT
Start: 2025-05-10 | End: 2025-05-10

## 2025-05-10 RX ADMIN — METOCLOPRAMIDE 10 MG: 5 INJECTION, SOLUTION INTRAMUSCULAR; INTRAVENOUS at 12:04

## 2025-05-10 RX ADMIN — DIPHENHYDRAMINE HYDROCHLORIDE 50 MG: 50 INJECTION, SOLUTION INTRAMUSCULAR; INTRAVENOUS at 12:05

## 2025-05-10 RX ADMIN — LORAZEPAM 1 MG: 2 INJECTION INTRAMUSCULAR; INTRAVENOUS at 13:09

## 2025-05-10 RX ADMIN — KETOROLAC TROMETHAMINE 15 MG: 30 INJECTION, SOLUTION INTRAMUSCULAR; INTRAVENOUS at 12:04

## 2025-05-10 RX ADMIN — MAGNESIUM SULFATE HEPTAHYDRATE 2 G: 40 INJECTION, SOLUTION INTRAVENOUS at 12:05

## 2025-05-10 RX ADMIN — SODIUM CHLORIDE 1000 ML: 0.9 INJECTION, SOLUTION INTRAVENOUS at 12:02

## 2025-05-10 NOTE — Clinical Note
Lena Coates was seen and treated in our emergency department on 5/10/2025.            may return sooner should symptoms subside    Diagnosis:     Lena  may return to work on return date.    She may return on this date: 05/14/2025         If you have any questions or concerns, please don't hesitate to call.      Laurita Malcolm PA-C    ______________________________           _______________          _______________  Hospital Representative                              Date                                Time

## 2025-05-10 NOTE — ED PROVIDER NOTES
Time reflects when diagnosis was documented in both MDM as applicable and the Disposition within this note       Time User Action Codes Description Comment    5/10/2025  1:51 PM GoldLaurita Add [R51.9] Headache           ED Disposition       ED Disposition   Discharge    Condition   Stable    Date/Time   Sat May 10, 2025  1:51 PM    Comment   Lena NOLASCO Labriola discharge to home/self care.                   Assessment & Plan       Medical Decision Making  WBC 12.7, currently on steroids. Afebrile. No nuchal rigidity. Hypomag at 1.8, given mag in the ED.     Patient feeling better after migraine cocktail and continued to feel better after Ativan.  I do believe that there is a muscular component as patient is directly tender over the cervical paraspinous muscular region, may be due to sleeping in a new position. Patient also seen by Dr. Barfield. Case and treatment discussed with attending Dr. Barfield and Dr. Tian. Patient has had I'm proved visual changes since earlier in the week. Will have her continue currently medications and will treat cervical muscle spasm/strain with flexeril and migraine with Fioricet as needed. Informed not to drive or operate heavy machinery while taking medication. Will have her continue with ophtho appointment in 3 days as well as f/u with neurology.     Patient is informed to return to the emergency department for worsening of symptoms such as persistent vomiting, severe pain, fevers, visual deficits/changes, etc and was given proper education regarding their diagnosis and symptoms. Otherwise the patient is informed to follow up with their primary care doctor for re-evaluation. The patient verbalizes understanding and agrees with above assessment and plan. All questions were answered.          Amount and/or Complexity of Data Reviewed  Labs: ordered. Decision-making details documented in ED Course.    Risk  Prescription drug management.        ED Course as of 05/10/25 9036    Sat May 10, 2025   1222 WBC(!): 12.75  Patient on steroids    1257 Patient re-evaluated and feeling a little better. Seen by Dr. Barfield.    1347 Patient re-evaluated feeling much better after ativan. Would like to be discharged.        Medications   sodium chloride 0.9 % bolus 1,000 mL (0 mL Intravenous Stopped 5/10/25 1330)   magnesium sulfate 2 g/50 mL IVPB (premix) 2 g (0 g Intravenous Stopped 5/10/25 1317)   ketorolac (TORADOL) injection 15 mg (15 mg Intravenous Given 5/10/25 1204)   metoclopramide (REGLAN) injection 10 mg (10 mg Intravenous Given 5/10/25 1204)   diphenhydrAMINE (BENADRYL) injection 50 mg (50 mg Intravenous Given 5/10/25 1205)   LORazepam (ATIVAN) injection 1 mg (1 mg Intravenous Given 5/10/25 1309)       ED Risk Strat Scores                    No data recorded                            History of Present Illness       Chief Complaint   Patient presents with    Headache     Recently admitted with migraine. States discharged with pain of 3, meds not helping, now 10, nauseated and photophobic       Past Medical History:   Diagnosis Date    Abnormal weight gain     Anemia 2017    during pregnancy    Candidiasis, mouth     Cellulitis of right arm     Diverticulitis of colon     Kidney infection     Lyme disease     Pre-employment health screening examination 2018    SIRS (systemic inflammatory response syndrome) (HCC)     Tinea corporis       Past Surgical History:   Procedure Laterality Date    BREAST BIOPSY       SECTION      FOOT SURGERY      HYSTERECTOMY  2024    Uterus and cervix    IR LUMBAR PUNCTURE  2025    AK CYSTOURETHROSCOPY N/A 2024    Procedure: CYSTOSCOPY;  Surgeon: Keyla Peter MD;  Location: [facility];  Service: Gynecology    AK LAPS TOTAL HYSTERECT 250 GM/< W/RMVL TUBE/OVARY Bilateral 2024    Procedure: HYSTERECTOMY LAPAROSCOPIC TOTAL (LTH) WITH RIGHT PARTIAL SALPINGECTOMY, LYSIS OF ADHESIONS, EXAM UNDER ANESTHESIA;  Surgeon:  Keyla Peter MD;  Location: [facility];  Service: Gynecology    SALPINGECTOMY Bilateral     TUBAL LIGATION  4/19/2021    Tubes removed    US GUIDED BREAST BIOPSY RIGHT COMPLETE Right 11/18/2021      Family History   Problem Relation Age of Onset    Diabetes Mother     Atrial fibrillation Mother     No Known Problems Father     Diabetes Maternal Grandmother     Cancer Maternal Grandfather     Cancer Paternal Grandfather     Depression Half-Sister     Learning disabilities Half-Brother     Auditory processing disorder Half-Brother     Breast cancer Other       Social History     Tobacco Use    Smoking status: Never     Passive exposure: Never    Smokeless tobacco: Never   Vaping Use    Vaping status: Never Used   Substance Use Topics    Alcohol use: Not Currently     Comment: social    Drug use: No      E-Cigarette/Vaping    E-Cigarette Use Never User       E-Cigarette/Vaping Substances    Nicotine No     THC No     CBD No     Flavoring No     Other No     Unknown No       I have reviewed and agree with the history as documented.     33 y/o female presenting with a migraine that began this morning. Was recently admitted and discharged 2 days ago after she she was evaluated for a weeklong migraine.  Patient had CT scan of the head, lumbar puncture as well as MRI of the brain showing signs of intracranial hypertension.  Patient was discharged wit improved symptomsh and a headache of 3/10.  Was also discharged on Diamox and Decadron.  Has not been taking any other medication for her symptoms. Prior to LP and admission was taking ibuprofen and imitrex without relief.  States that she has been sleeping well however needs to sleep in an upright position and has been using different pillows. Generally will wake up with improved symptoms however then worsens throughout the day, woke up this morning and was taking care of her children when she developed a worsening headache that is now severe accompanied with nausea  however no vomiting.  States that she has also had some neck tenderness however no rigidity or stiffness and no radiating pain.  Has not had any injuries or other recent illnesses over the past week.  She does have a past history of migraines however this past week it seemed to be more persistent than usual.  She has a scheduled ophthalmology appointment for Tuesday which is in 3 days.  Relays that her visual changes have significantly improved however she now has the new neck discomfort and pain across her forehead.  Has had some numbness and tingling to the heel region after she was admitted and discharged however no numbness or paresthesias.  Has not had any visual deficits or field loss/floaters etc however has had some photophobia and pain with eye movement.   Denies fevers, cough, congestion, head injury, weakness, changes in vision, etc.         Review of Systems   Constitutional: Negative.  Negative for chills, fatigue and fever.   HENT: Negative.  Negative for congestion, postnasal drip, rhinorrhea and sore throat.    Eyes:  Positive for photophobia. Negative for pain, discharge, redness, itching and visual disturbance.   Respiratory: Negative.  Negative for cough, shortness of breath and wheezing.    Cardiovascular: Negative.    Gastrointestinal:  Positive for nausea. Negative for abdominal distention, abdominal pain, anal bleeding, blood in stool, constipation, diarrhea, rectal pain and vomiting.   Endocrine: Negative.    Genitourinary: Negative.    Musculoskeletal:  Positive for neck pain. Negative for arthralgias, back pain, gait problem, joint swelling, myalgias and neck stiffness.   Skin: Negative.    Neurological:  Positive for headaches. Negative for dizziness, tremors, seizures, syncope, facial asymmetry, speech difficulty, weakness, light-headedness and numbness.   Hematological: Negative.    Psychiatric/Behavioral: Negative.     All other systems reviewed and are negative.          Objective        ED Triage Vitals [05/10/25 1110]   Temperature Pulse Blood Pressure Respirations SpO2 Patient Position - Orthostatic VS   98.7 °F (37.1 °C) 76 162/88 20 99 % Sitting      Temp src Heart Rate Source BP Location FiO2 (%) Pain Score    -- Monitor Right arm -- 10 - Worst Possible Pain      Vitals      Date and Time Temp Pulse SpO2 Resp BP Pain Score FACES Pain Rating User   05/10/25 1330 -- 70 99 % 20 112/57 6 -- KENN   05/10/25 1153 -- -- -- -- -- 10 - Worst Possible Pain -- MK   05/10/25 1110 98.7 °F (37.1 °C) 76 99 % 20 162/88 10 - Worst Possible Pain -- LS            Physical Exam  Vitals and nursing note reviewed.   Constitutional:       General: She is not in acute distress.     Appearance: Normal appearance. She is well-developed and normal weight. She is not diaphoretic.   HENT:      Head: Normocephalic and atraumatic.        Right Ear: Tympanic membrane, ear canal and external ear normal.      Left Ear: Tympanic membrane, ear canal and external ear normal.      Nose: Nose normal.      Mouth/Throat:      Pharynx: No oropharyngeal exudate.   Eyes:      General: Lids are normal. Vision grossly intact. No scleral icterus.        Right eye: No discharge.         Left eye: No discharge.      Extraocular Movements: Extraocular movements intact.      Right eye: Normal extraocular motion and no nystagmus.      Left eye: Normal extraocular motion and no nystagmus.      Conjunctiva/sclera: Conjunctivae normal.      Pupils: Pupils are equal, round, and reactive to light.     Neck:      Vascular: No carotid bruit.   Cardiovascular:      Rate and Rhythm: Normal rate and regular rhythm.      Pulses: Normal pulses.      Heart sounds: Normal heart sounds. No murmur heard.     No friction rub. No gallop.   Pulmonary:      Effort: Pulmonary effort is normal. No respiratory distress.      Breath sounds: Normal breath sounds. No stridor. No wheezing, rhonchi or rales.   Chest:      Chest wall: No tenderness.   Abdominal:       General: Abdomen is flat. Bowel sounds are normal. There is no distension.      Palpations: Abdomen is soft. There is no mass.      Tenderness: There is no abdominal tenderness. There is no guarding or rebound.      Hernia: No hernia is present.   Musculoskeletal:      Cervical back: Normal range of motion and neck supple. Tenderness present. No rigidity.   Lymphadenopathy:      Cervical: No cervical adenopathy.   Skin:     General: Skin is warm and dry.      Capillary Refill: Capillary refill takes less than 2 seconds.      Coloration: Skin is not pale.      Findings: No erythema or rash.   Neurological:      General: No focal deficit present.      Mental Status: She is alert and oriented to person, place, and time. Mental status is at baseline.   Psychiatric:         Thought Content: Thought content normal.         Judgment: Judgment normal.         Results Reviewed       Procedure Component Value Units Date/Time    Basic metabolic panel [[id number]]  (Abnormal) Collected: 05/10/25 1200    Lab Status: Final result Specimen: Blood from Line, Venous Updated: 05/10/25 1230     Sodium 137 mmol/L      Potassium 3.9 mmol/L      Chloride 108 mmol/L      CO2 18 mmol/L      ANION GAP 11 mmol/L      BUN 16 mg/dL      Creatinine 0.78 mg/dL      Glucose 125 mg/dL      Calcium 9.8 mg/dL      eGFR 99 ml/min/1.73sq m     Narrative:      National Kid[employer] Foundation guidelines for Chronic Kidney Disease (CKD):     Stage 1 with normal or high GFR (GFR > 90 mL/min/1.73 square meters)    Stage 2 Mild CKD (GFR = 60-89 mL/min/1.73 square meters)    Stage 3A Moderate CKD (GFR = 45-59 mL/min/1.73 square meters)    Stage 3B Moderate CKD (GFR = 30-44 mL/min/1.73 square meters)    Stage 4 Severe CKD (GFR = 15-29 mL/min/1.73 square meters)    Stage 5 End Stage CKD (GFR <15 mL/min/1.73 square meters)  Note: GFR calculation is accurate only with a steady state creatinine    Magnesium [[id number]]  (Abnormal) Collected: 05/10/25 1200     Lab Status: Final result Specimen: Blood from Line, Venous Updated: 05/10/25 1230     Magnesium 1.8 mg/dL     CBC and differential [[id number]]  (Abnormal) Collected: 05/10/25 1200    Lab Status: Final result Specimen: Blood from Line, Venous Updated: 05/10/25 1208     WBC 12.75 Thousand/uL      RBC 5.19 Million/uL      Hemoglobin 15.1 g/dL      Hematocrit 45.9 %      MCV 88 fL      MCH 29.1 pg      MCHC 32.9 g/dL      RDW 13.2 %      MPV 10.0 fL      Platelets 364 Thousands/uL      nRBC 0 /100 WBCs      Segmented % 79 %      Immature Grans % 1 %      Lymphocytes % 17 %      Monocytes % 3 %      Eosinophils Relative 0 %      Basophils Relative 0 %      Absolute Neutrophils 9.98 Thousands/µL      Absolute Immature Grans 0.06 Thousand/uL      Absolute Lymphocytes 2.22 Thousands/µL      Absolute Monocytes 0.43 Thousand/µL      Eosinophils Absolute 0.04 Thousand/µL      Basophils Absolute 0.02 Thousands/µL             No orders to display       Procedures    ED Medication and Procedure Management   Prior to Admission Medications   Prescriptions Last Dose Informant Patient Reported? Taking?   SUMAtriptan (IMITREX) 100 mg tablet   No No   Sig: Take 1 tablet (100 mg total) by mouth once as needed for migraine (may repeat in 4 hours, max 200 mg in 24 hours) may repeat in 2 hours if necessary. Max dose 200mg in 24 hour period.   acetaZOLAMIDE (DIAMOX) 250 mg tablet   No No   Sig: Take 2 tablets (500 mg total) by mouth every 12 (twelve) hours   dexamethasone (DECADRON) 4 mg tablet   No No   Sig: Take 2 tabs in morning with breakfast for 2 days, then 1.5 tabs for 2 days, then 1 tab for 2 days, then 1/2 tab for 1 day.      Facility-Administered Medications: None     Patient's Medications   Discharge Prescriptions    BUTALBITAL-ACETAMINOPHEN-CAFFEINE (ESGIC) -40 MG PER TABLET    Take 1 tablet by mouth every 6 (six) hours as needed for headaches or migraine for up to 3 days       Start Date: 5/10/2025 End Date: 5/13/2025        Order Dose: 1 tablet       Quantity: 12 tablet    Refills: 0    CYCLOBENZAPRINE (FLEXERIL) 10 MG TABLET    Take 1 tablet (10 mg total) by mouth 3 (three) times a day as needed for muscle spasms for up to 4 days       Start Date: 5/10/2025 End Date: 5/14/2025       Order Dose: 10 mg       Quantity: 12 tablet    Refills: 0    NAPROXEN (NAPROSYN) 500 MG TABLET    Take 1 tablet (500 mg total) by mouth 2 (two) times a day with meals for 7 days       Start Date: 5/10/2025 End Date: 5/17/2025       Order Dose: 500 mg       Quantity: 14 tablet    Refills: 0     No discharge procedures on file.  ED SEPSIS DOCUMENTATION   Time reflects when diagnosis was documented in both MDM as applicable and the Disposition within this note       Time User Action Codes Description Comment    5/10/2025  1:51 PM Laurita Malcolm Add [R51.9] Headache                  Laurita Malcolm PA-C  05/10/25 1357       Laurita Malcolm PA-C  05/10/25 1358

## 2025-05-11 LAB — BACTERIA CSF CULT: NO GROWTH

## 2025-05-12 ENCOUNTER — OFFICE VISIT (OUTPATIENT)
Age: 35
End: 2025-05-12
Attending: INTERNAL MEDICINE
Payer: COMMERCIAL

## 2025-05-12 ENCOUNTER — TELEPHONE (OUTPATIENT)
Age: 35
End: 2025-05-12

## 2025-05-12 ENCOUNTER — TELEPHONE (OUTPATIENT)
Dept: NEUROLOGY | Facility: CLINIC | Age: 35
End: 2025-05-12

## 2025-05-12 DIAGNOSIS — H43.823 VITREOMACULAR ADHESION OF BOTH EYES: ICD-10-CM

## 2025-05-12 DIAGNOSIS — G93.2 INTRACRANIAL HYPERTENSION: Primary | ICD-10-CM

## 2025-05-12 PROCEDURE — 92133 CPTRZD OPH DX IMG PST SGM ON: CPT | Performed by: OPHTHALMOLOGY

## 2025-05-12 PROCEDURE — 99204 OFFICE O/P NEW MOD 45 MIN: CPT | Performed by: OPHTHALMOLOGY

## 2025-05-12 PROCEDURE — 92134 CPTRZ OPH DX IMG PST SGM RTA: CPT | Performed by: OPHTHALMOLOGY

## 2025-05-12 NOTE — TELEPHONE ENCOUNTER
Pts  calling in regarding making HFU appt. Dr Myers soonest Hfu appt is 12/2/2025. Pts  is upset because HFU instructions say to be seen within 3 weeks.     Pt placed High priority on waitlist.   Please assist in sooner appt. Thank you.

## 2025-05-12 NOTE — PROGRESS NOTES
Name: Lena Coates      : 1990      MRN: [id number]  Encounter Provider: Rosa Maria Bentley MD  Encounter Date: 2025   Encounter department: [facility]  :  Assessment & Plan  Intracranial hypertension  Pt has no evidence of ON swelling; Recommend f/u with neurology on next steps;   Orders:    Ambulatory Referral to Ophthalmology    OCT, Retina - OU - Both Eyes    OCT, Optic Nerve - OU - Both Eyes    Vitreomacular adhesion of both eyes  Pt has syneresis; No retinal tears, breaks, or detachments on dilated examination; Recommend monitoring; Retinal detachment precautions were discussed with the patient. The patient was instructed to call or return immediately for an increase in flashes of light, floaters (dark spots in vision), or curtaining of the visual field.                  Lena Coates is a 34 y.o. female who presents for intracranial pressure.    Migraines started .    Was seen at the ED twice within this month for Migraine. Had CT and MRI scan which showed intracranial hypertension.   While at the ED they provided Migraine cocktail which helped.     Reports focusing on letter worsens her migraines. Medication helps slightly but no much. Diamox and Decadron     Denies changes in vision.    History obtained from: patient    Review of Systems  Medical History Reviewed by provider this encounter:  Tobacco  Allergies  Meds  Problems  Med Hx  Surg Hx  Fam Hx     .     Past Ocular History:  none  Ocular Meds/Drops:   none    Base Eye Exam       Visual Acuity (Snellen - Linear)         Right Left    Dist cc 20/20 20/20      Correction: Glasses              Tonometry (Tonopen, 9:27 AM)         Right Left    Pressure 14 11              Pupils         Pupils    Right PERRL    Left PERRL              Visual Fields         Left Right     Full Full              Extraocular Movement         Right Left     Full Full              Neuro/Psych       Oriented x3: Yes               Dilation       Both eyes: 2.5% Phenylephrine @ 9:27 AM              Dilation #2       Both eyes: 1% Tropicamide @ 9:27 AM                  Slit Lamp and Fundus Exam       External Exam         Right Left    External Normal Normal              Slit Lamp Exam         Right Left    Lids/Lashes Normal Normal    Conjunctiva/Sclera White and quiet White and quiet    Cornea Clear Clear    Anterior Chamber Deep and quiet Deep and quiet    Iris Round and reactive Round and reactive    Lens clear clear    Anterior Vitreous No PVD, clear, no cell No PVD, clear, no cell              Fundus Exam         Right Left    Disc sharp, no pallor; sharp, no pallor    C/D Ratio 0.25 0.25    Macula Good foveal reflex Good foveal reflex    Vessels normal in caliber normal in caliber    Periphery Flat, no retinal tear or detachment, no masses Flat, no retinal tear or detachment, no masses                      IMAGING:  OCT, Retina - OU - Both Eyes          Right Eye  Quality was good. Findings include (Vitreomacular adhesion).     Left Eye  Quality was good. Findings include (Vitreomacular adhesion).          OCT, Optic Nerve - OU - Both Eyes          Right Eye  Reliability was good. Temporal thickness was normal. Superior thickness was normal. Nasal thickness was normal. Inferior thickness was normal.     Left Eye  Reliability was good. Temporal thickness was normal. Superior thickness was normal. Nasal thickness was normal. Inferior thickness was normal.

## 2025-05-12 NOTE — TELEPHONE ENCOUNTER
Patient's  has called us back to request if possible that his wife sees another provider being that he wife is really not doing well and they wont mind going to any other center.   I have advised patient's  that she is on High priority wait list.     He will like fo the office to try their best on getting his wife in sooner.

## 2025-05-12 NOTE — TELEPHONE ENCOUNTER
Scheduled 12/2/25 @ 12:00 with Dr Genao in [place] office.     HFU/oksana voss/intracranial hypertension    Dc-home- 4/22/25    Notes- The patient will need to follow-up with Dr. Genao in 2 to 3 weeks postdischarge in regards to her headache, no further imaging at this time, patient will need follow-up with ophthalmology.

## 2025-05-12 NOTE — TELEPHONE ENCOUNTER
Left message to offer earlier appt with dr eason for 7/9 at 11:00am. Asked pt to call us back to confirm if they'd like this appt

## 2025-05-12 NOTE — ASSESSMENT & PLAN NOTE
Pt has no evidence of ON swelling; Recommend f/u with neurology on next steps;   Orders:    Ambulatory Referral to Ophthalmology    OCT, Retina - OU - Both Eyes    OCT, Optic Nerve - OU - Both Eyes

## 2025-05-13 ENCOUNTER — HOSPITAL ENCOUNTER (EMERGENCY)
Facility: HOSPITAL | Age: 35
Discharge: HOME | End: 2025-05-13
Attending: EMERGENCY MEDICINE
Payer: COMMERCIAL

## 2025-05-13 ENCOUNTER — ANESTHESIA EVENT (EMERGENCY)
Dept: ANESTHESIOLOGY | Facility: HOSPITAL | Age: 35
End: 2025-05-13
Payer: COMMERCIAL

## 2025-05-13 ENCOUNTER — ANESTHESIA (EMERGENCY)
Dept: ANESTHESIOLOGY | Facility: HOSPITAL | Age: 35
End: 2025-05-13
Payer: COMMERCIAL

## 2025-05-13 VITALS
RESPIRATION RATE: 20 BRPM | HEART RATE: 63 BPM | OXYGEN SATURATION: 100 % | SYSTOLIC BLOOD PRESSURE: 122 MMHG | TEMPERATURE: 98.1 F | DIASTOLIC BLOOD PRESSURE: 73 MMHG

## 2025-05-13 DIAGNOSIS — G97.1 POST-DURAL PUNCTURE HEADACHE: Primary | ICD-10-CM

## 2025-05-13 LAB
ANION GAP SERPL CALCULATED.3IONS-SCNC: 9 MMOL/L (ref 4–13)
BASOPHILS # BLD AUTO: 0.04 THOUSANDS/ÂΜL (ref 0–0.1)
BASOPHILS NFR BLD AUTO: 0 % (ref 0–1)
BUN SERPL-MCNC: 18 MG/DL (ref 5–25)
CALCIUM SERPL-MCNC: 8.9 MG/DL (ref 8.4–10.2)
CHLORIDE SERPL-SCNC: 109 MMOL/L (ref 96–108)
CO2 SERPL-SCNC: 20 MMOL/L (ref 21–32)
CREAT SERPL-MCNC: 0.85 MG/DL (ref 0.6–1.3)
EOSINOPHIL # BLD AUTO: 0.04 THOUSAND/ÂΜL (ref 0–0.61)
EOSINOPHIL NFR BLD AUTO: 0 % (ref 0–6)
ERYTHROCYTE [DISTWIDTH] IN BLOOD BY AUTOMATED COUNT: 13.5 % (ref 11.6–15.1)
GFR SERPL CREATININE-BSD FRML MDRD: 89 ML/MIN/1.73SQ M
GLUCOSE SERPL-MCNC: 118 MG/DL (ref 65–140)
HCT VFR BLD AUTO: 43.3 % (ref 34.8–46.1)
HGB BLD-MCNC: 14.5 G/DL (ref 11.5–15.4)
IMM GRANULOCYTES # BLD AUTO: 0.05 THOUSAND/UL (ref 0–0.2)
IMM GRANULOCYTES NFR BLD AUTO: 1 % (ref 0–2)
LYMPHOCYTES # BLD AUTO: 2.4 THOUSANDS/ÂΜL (ref 0.6–4.47)
LYMPHOCYTES NFR BLD AUTO: 22 % (ref 14–44)
MCH RBC QN AUTO: 28.8 PG (ref 26.8–34.3)
MCHC RBC AUTO-ENTMCNC: 33.5 G/DL (ref 31.4–37.4)
MCV RBC AUTO: 86 FL (ref 82–98)
MONOCYTES # BLD AUTO: 0.38 THOUSAND/ÂΜL (ref 0.17–1.22)
MONOCYTES NFR BLD AUTO: 4 % (ref 4–12)
NEUTROPHILS # BLD AUTO: 7.91 THOUSANDS/ÂΜL (ref 1.85–7.62)
NEUTS SEG NFR BLD AUTO: 73 % (ref 43–75)
NRBC BLD AUTO-RTO: 0 /100 WBCS
PLATELET # BLD AUTO: 321 THOUSANDS/UL (ref 149–390)
PMV BLD AUTO: 9.7 FL (ref 8.9–12.7)
POTASSIUM SERPL-SCNC: 3.8 MMOL/L (ref 3.5–5.3)
RBC # BLD AUTO: 5.04 MILLION/UL (ref 3.81–5.12)
SODIUM SERPL-SCNC: 138 MMOL/L (ref 135–147)
WBC # BLD AUTO: 10.82 THOUSAND/UL (ref 4.31–10.16)

## 2025-05-13 PROCEDURE — 96365 THER/PROPH/DIAG IV INF INIT: CPT

## 2025-05-13 PROCEDURE — 85025 COMPLETE CBC W/AUTO DIFF WBC: CPT

## 2025-05-13 PROCEDURE — 96361 HYDRATE IV INFUSION ADD-ON: CPT

## 2025-05-13 PROCEDURE — 96375 TX/PRO/DX INJ NEW DRUG ADDON: CPT

## 2025-05-13 PROCEDURE — 36415 COLL VENOUS BLD VENIPUNCTURE: CPT

## 2025-05-13 PROCEDURE — 99284 EMERGENCY DEPT VISIT MOD MDM: CPT | Performed by: EMERGENCY MEDICINE

## 2025-05-13 PROCEDURE — 96366 THER/PROPH/DIAG IV INF ADDON: CPT

## 2025-05-13 PROCEDURE — 80048 BASIC METABOLIC PNL TOTAL CA: CPT

## 2025-05-13 PROCEDURE — 99284 EMERGENCY DEPT VISIT MOD MDM: CPT

## 2025-05-13 RX ORDER — METOCLOPRAMIDE HYDROCHLORIDE 5 MG/ML
10 INJECTION INTRAMUSCULAR; INTRAVENOUS ONCE
Status: COMPLETED | OUTPATIENT
Start: 2025-05-13 | End: 2025-05-13

## 2025-05-13 RX ORDER — ACETAMINOPHEN 10 MG/ML
1000 INJECTION, SOLUTION INTRAVENOUS ONCE
Status: COMPLETED | OUTPATIENT
Start: 2025-05-13 | End: 2025-05-13

## 2025-05-13 RX ORDER — MAGNESIUM SULFATE HEPTAHYDRATE 40 MG/ML
2 INJECTION, SOLUTION INTRAVENOUS ONCE
Status: COMPLETED | OUTPATIENT
Start: 2025-05-13 | End: 2025-05-13

## 2025-05-13 RX ORDER — DIPHENHYDRAMINE HYDROCHLORIDE 50 MG/ML
25 INJECTION, SOLUTION INTRAMUSCULAR; INTRAVENOUS ONCE
Status: COMPLETED | OUTPATIENT
Start: 2025-05-13 | End: 2025-05-13

## 2025-05-13 RX ORDER — KETOROLAC TROMETHAMINE 30 MG/ML
15 INJECTION, SOLUTION INTRAMUSCULAR; INTRAVENOUS ONCE
Status: COMPLETED | OUTPATIENT
Start: 2025-05-13 | End: 2025-05-13

## 2025-05-13 RX ADMIN — DIPHENHYDRAMINE HYDROCHLORIDE 25 MG: 50 INJECTION, SOLUTION INTRAMUSCULAR; INTRAVENOUS at 17:51

## 2025-05-13 RX ADMIN — SODIUM CHLORIDE 1000 ML: 0.9 INJECTION, SOLUTION INTRAVENOUS at 21:21

## 2025-05-13 RX ADMIN — ACETAMINOPHEN 1000 MG: 10 INJECTION INTRAVENOUS at 17:55

## 2025-05-13 RX ADMIN — METOCLOPRAMIDE 10 MG: 5 INJECTION, SOLUTION INTRAMUSCULAR; INTRAVENOUS at 17:54

## 2025-05-13 RX ADMIN — MAGNESIUM SULFATE HEPTAHYDRATE 2 G: 40 INJECTION, SOLUTION INTRAVENOUS at 18:42

## 2025-05-13 RX ADMIN — KETOROLAC TROMETHAMINE 15 MG: 30 INJECTION, SOLUTION INTRAMUSCULAR; INTRAVENOUS at 17:53

## 2025-05-13 NOTE — ANESTHESIA PROCEDURE NOTES
Blood Patch:  Patient location during procedure: ED  Start time: 5/13/2025 7:46 PM  Reason for Blood Patch: spinal headache  Preanesthetic Checklist:  Completed: patient identified, site marked, surgical consent, pre-op evaluation, timeout performed, IV checked, risks and benefits discussed, monitors and equipment checked and anesthesia consent given  Procedure Information:  Location of venous blood draw: arm  Volume of blood injected: 20 mL  Patient position: sitting  Prep: Chloraprep  Patient monitoring: continuous pulse oximetry  Approach: midline  Injection technique: PANKAJ saline  Location: L3-4  Needle and Epidural Catheter:  Injection method: Touhy needle  Needle gauge: 17G  Needle length: 10 cm  Loss of resistance depth: 6 cm  Total Loading Dose: 20 mL  Assessment:  Events: well tolerated  Additional Notes:  Full sterile field and technique used for blood draw. Ultrasound ultimately needed. Patient tolerated procedure well and experienced dramatic relief immediatly after. Instructed to lie flat for one hour.

## 2025-05-13 NOTE — ED ATTENDING ATTESTATION
5/13/2025  I, Rico Mendoza MD, saw and evaluated the patient. I have discussed the patient with the resident/non-physician practitioner and agree with the resident's/non-physician practitioner's findings, Plan of Care, and MDM as documented in the resident's/non-physician practitioner's note, except where noted. All available labs and Radiology studies were reviewed.  I was present for key portions of any procedure(s) performed by the resident/non-physician practitioner and I was immediately available to provide assistance.       At this point I agree with the current assessment done in the Emergency Department.  I have conducted an independent evaluation of this patient a history and physical is as follows:    34-year-old female presented back to the emergency department for evaluation of ongoing diffuse headache.  She states essentially she has had a debilitating headache for the last 2 weeks.  Noted previously was focused behind her left eye, more sharp.  She was found during workup to have a idiopathic intracranial hypertension and underwent lumbar puncture by IR while admitted with an opening pressure of about 24 and a closing pressure of about 10 cm water.  Patient denied any headache improvement after CSF drainage.  She was seen by neurology and initiated on acetazolamide.  She denies any meaningful improvement in symptoms.  She did, however noted a change in character in her headache after having the procedure done.  States now it is more diffuse and worsens with being upright and with activity.  The only time she feels relief is when she lies down.  She notes increased fatigue and spends a significant portion of her day sleeping.  She has some discomfort in the posterior neck but has no stiffness/rigidity.  No fevers.  Her CSF had been negative for any signs of infection.  She was recently seen in the emergency department and had some symptomatic improvement with a migraine cocktail and was discharged  home but noted recurrence of symptoms shortly afterwards.  She has no focal neurologic deficits here.  Given change in character in headache and its positional nature she may have symptomatic post-dural puncture headache.  Plan symptomatic occasions as well as discussing blood patch with anesthesia.    ED Course  ED Course as of 05/13/25 2157   Tue May 13, 2025   2154 Patient reported immediate improvement in headache after anesthesia performed blood patch.         Critical Care Time  Procedures

## 2025-05-13 NOTE — DISCHARGE INSTRUCTIONS
Follow up with neurology as planned.    Return to the ER if you develop:    Worsening headache, confusion, numbness, weakness, vomiting, loss of consciousness, neck stiffness, slurred speech, vision changes.

## 2025-05-13 NOTE — ED PROVIDER NOTES
Time reflects when diagnosis was documented in both MDM as applicable and the Disposition within this note       Time User Action Codes Description Comment    5/13/2025  7:50 PM Kristian King Add [G97.1] Post-dural puncture headache           ED Disposition       ED Disposition   Discharge    Condition   Stable    Date/Time   Tue May 13, 2025  7:50 PM    Comment   Lena Coates discharge to home/self care.                   Assessment & Plan       Medical Decision Making  34y.o F presenting with HA. Ddx includes but not limited to post-dural HA, migraine, pseudotumor cerebri; exam and history not c/w SAH, intracranial hematoma, meningitis, venous sinus thrombosis, dissection. Pt was provided migraine cocktail which provided little relief. Labs taken to evaluate for electrolyte derangements as a cause of her paresthesia. Labs wnl. Anesthesia consulted who placed a blood patch. This provided immediate relief to the Pt. She was observed in the ED for 1hr. She tolerated PO and ambulated safely without recurrence of her HA. Pt felt comfortable going home. Strict return precautions discussed.     Amount and/or Complexity of Data Reviewed  Labs: ordered.    Risk  Prescription drug management.        ED Course as of 05/13/25 2344   Tue May 13, 2025   1849 Anesthesia consulted. They agreed to evaluate for blood patch     2007 Pt feeling significantly better s/p blood patch       Medications   acetaminophen (Ofirmev) injection 1,000 mg (0 mg Intravenous Stopped 5/13/25 1810)   metoclopramide (REGLAN) injection 10 mg (10 mg Intravenous Given 5/13/25 1754)   diphenhydrAMINE (BENADRYL) injection 25 mg (25 mg Intravenous Given 5/13/25 1751)   ketorolac (TORADOL) injection 15 mg (15 mg Intravenous Given 5/13/25 1753)   magnesium sulfate 2 g/50 mL IVPB (premix) 2 g (0 g Intravenous Stopped 5/13/25 2029)   sodium chloride 0.9 % bolus 1,000 mL (0 mL Intravenous Stopped 5/13/25 2217)       ED Risk Strat Scores                     No data recorded        SBIRT 20yo+      Flowsheet Row Most Recent Value   Initial Alcohol Screen: US AUDIT-C     1. How often do you have a drink containing alcohol? 0 Filed at: 2025 1550   2. How many drinks containing alcohol do you have on a typical day you are drinking?  0 Filed at: 2025 1550   3a. Male UNDER 65: How often do you have five or more drinks on one occasion? 0 Filed at: 2025 1550   3b. FEMALE Any Age, or MALE 65+: How often do you have 4 or more drinks on one occassion? 0 Filed at: 2025 1550   Audit-C Score 0 Filed at: 2025   FRANKY: How many times in the past year have you...    Used an illegal drug or used a prescription medication for non-medical reasons? Never Filed at: 2025 1550                            History of Present Illness       Chief Complaint   Patient presents with    Migraine     Pt reports migraine since , has been seen in multiple ER's without relief, reports worsening. C/o L arm numbness, along with blle tingling. Pt has symmetrical strength. Reports lumbar puncture done on Wednesday had a pressure of 24. (+)nausea/indigestion/sound sensitivity       Past Medical History:   Diagnosis Date    Abnormal weight gain     Anemia     during pregnancy    Candidiasis, mouth     Cellulitis of right arm     Diverticulitis of colon 2025    Er visit ct scan confirmed    Kidney infection     Lyme disease     Pre-employment health screening examination 2018    SIRS (systemic inflammatory response syndrome) (HCC)     Tinea corporis       Past Surgical History:   Procedure Laterality Date    ABDOMINAL SURGERY  2017    Two c sections    BREAST BIOPSY       SECTION      FOOT SURGERY  [id number]    Four surgeries in left foot    HYSTERECTOMY  2024    Uterus and cervix    IR LUMBAR PUNCTURE  2025    NE CYSTOURETHROSCOPY N/A 2024    Procedure: CYSTOSCOPY;  Surgeon: Keyla Peter MD;   Location: [facility];  Service: Gynecology    [place] LAPS TOTAL HYSTERECT 250 GM/< W/RMVL TUBE/OVARY Bilateral 12/18/2024    Procedure: HYSTERECTOMY LAPAROSCOPIC TOTAL (LTH) WITH RIGHT PARTIAL SALPINGECTOMY, LYSIS OF ADHESIONS, EXAM UNDER ANESTHESIA;  Surgeon: Keyla Peter MD;  Location: [facility];  Service: Gynecology    SALPINGECTOMY Bilateral     TUBAL LIGATION  4/19/2021    Tubes removed    US GUIDED BREAST BIOPSY RIGHT COMPLETE Right 11/18/2021      Family History   Problem Relation Age of Onset    Diabetes Mother     Atrial fibrillation Mother     No Known Problems Father     Diabetes Maternal Grandmother     Cancer Maternal Grandfather     Cancer Paternal Grandfather     Depression Half-Sister     Learning disabilities Half-Brother     Auditory processing disorder Half-Brother     Breast cancer Other       Social History     Tobacco Use    Smoking status: Never     Passive exposure: Never    Smokeless tobacco: Never   Vaping Use    Vaping status: Never Used   Substance Use Topics    Alcohol use: Not Currently     Comment: social    Drug use: No      E-Cigarette/Vaping    E-Cigarette Use Never User       E-Cigarette/Vaping Substances    Nicotine No     THC No     CBD No     Flavoring No     Other No     Unknown No       I have reviewed and agree with the history as documented.     34y.o F w/ h/o idiopathic intracranial hypertension presenting with HA. A few weeks ago Pt developed daily HA. She was seen at an OSH where she had an LP done showing elevated ICP. She felt better after the LP and migraine cocktail. However, her pain returned and has been present for several days post-LP. Describes the pain as diffuse, throbbing, worse when sitting upright, better when flat. Has associated photophobia, phonophobia, myalgias, nausea. Denies neck stiffness, fever, h/o DVT, DVT risk factors, Fhx of coagulopathy, vision changes, head trauma.      History provided by:  Patient      Review of Systems    Constitutional:  Positive for activity change and appetite change. Negative for fever.   Eyes:  Positive for photophobia. Negative for visual disturbance.   Respiratory:  Negative for shortness of breath.    Cardiovascular:  Negative for chest pain and leg swelling.   Gastrointestinal:  Positive for nausea. Negative for abdominal pain.   Musculoskeletal:  Positive for myalgias. Negative for back pain and neck stiffness.   Neurological:  Positive for headaches. Negative for syncope, facial asymmetry, speech difficulty, weakness, light-headedness and numbness.           Objective       ED Triage Vitals   Temperature Pulse Blood Pressure Respirations SpO2 Patient Position - Orthostatic VS   05/13/25 1546 05/13/25 1546 05/13/25 1546 05/13/25 1546 05/13/25 1546 05/13/25 1546   98.1 °F (36.7 °C) 90 139/90 20 100 % Sitting      Temp src Heart Rate Source BP Location FiO2 (%) Pain Score    -- 05/13/25 1546 05/13/25 1546 -- 05/13/25 1753     Monitor Right arm  10 - Worst Possible Pain      Vitals      Date and Time Temp Pulse SpO2 Resp BP Pain Score FACES Pain Rating User   05/13/25 2215 -- 63 100 % -- 122/73 No Pain -- KB   05/13/25 1753 -- -- -- -- -- 10 - Worst Possible Pain -- TB   05/13/25 1546 98.1 °F (36.7 °C) 90 100 % 20 139/90 -- -- DW            Physical Exam  Constitutional:       General: She is not in acute distress.     Appearance: Normal appearance.   HENT:      Head: Normocephalic and atraumatic.      Nose: Nose normal. No rhinorrhea.      Mouth/Throat:      Mouth: Mucous membranes are moist.      Pharynx: Oropharynx is clear.   Eyes:      Extraocular Movements: Extraocular movements intact.      Conjunctiva/sclera: Conjunctivae normal.      Pupils: Pupils are equal, round, and reactive to light.   Cardiovascular:      Rate and Rhythm: Normal rate and regular rhythm.      Pulses: Normal pulses.      Heart sounds: Normal heart sounds.   Pulmonary:      Effort: Pulmonary effort is normal.      Breath sounds:  Normal breath sounds.   Abdominal:      General: Abdomen is flat.      Palpations: Abdomen is soft.   Musculoskeletal:         General: No tenderness.      Cervical back: Normal range of motion and neck supple. No rigidity.      Right lower leg: No edema.      Left lower leg: No edema.   Skin:     General: Skin is warm and dry.      Capillary Refill: Capillary refill takes less than 2 seconds.   Neurological:      General: No focal deficit present.      Mental Status: She is alert and oriented to person, place, and time.         Results Reviewed       Procedure Component Value Units Date/Time    Basic metabolic panel [[id number]]  (Abnormal) Collected: 05/13/25 1748    Lab Status: Final result Specimen: Blood from Arm, Right Updated: 05/13/25 1810     Sodium 138 mmol/L      Potassium 3.8 mmol/L      Chloride 109 mmol/L      CO2 20 mmol/L      ANION GAP 9 mmol/L      BUN 18 mg/dL      Creatinine 0.85 mg/dL      Glucose 118 mg/dL      Calcium 8.9 mg/dL      eGFR 89 ml/min/1.73sq m     Narrative:      National Kid[employer] Foundation guidelines for Chronic Kidney Disease (CKD):     Stage 1 with normal or high GFR (GFR > 90 mL/min/1.73 square meters)    Stage 2 Mild CKD (GFR = 60-89 mL/min/1.73 square meters)    Stage 3A Moderate CKD (GFR = 45-59 mL/min/1.73 square meters)    Stage 3B Moderate CKD (GFR = 30-44 mL/min/1.73 square meters)    Stage 4 Severe CKD (GFR = 15-29 mL/min/1.73 square meters)    Stage 5 End Stage CKD (GFR <15 mL/min/1.73 square meters)  Note: GFR calculation is accurate only with a steady state creatinine    CBC and differential [[id number]]  (Abnormal) Collected: 05/13/25 1748    Lab Status: Final result Specimen: Blood from Arm, Right Updated: 05/13/25 1757     WBC 10.82 Thousand/uL      RBC 5.04 Million/uL      Hemoglobin 14.5 g/dL      Hematocrit 43.3 %      MCV 86 fL      MCH 28.8 pg      MCHC 33.5 g/dL      RDW 13.5 %      MPV 9.7 fL      Platelets 321 Thousands/uL      nRBC 0 /100 WBCs       Segmented % 73 %      Immature Grans % 1 %      Lymphocytes % 22 %      Monocytes % 4 %      Eosinophils Relative 0 %      Basophils Relative 0 %      Absolute Neutrophils 7.91 Thousands/µL      Absolute Immature Grans 0.05 Thousand/uL      Absolute Lymphocytes 2.40 Thousands/µL      Absolute Monocytes 0.38 Thousand/µL      Eosinophils Absolute 0.04 Thousand/µL      Basophils Absolute 0.04 Thousands/µL             No orders to display       Procedures    ED Medication and Procedure Management   Prior to Admission Medications   Prescriptions Last Dose Informant Patient Reported? Taking?   SUMAtriptan (IMITREX) 100 mg tablet Past Month  No Yes   Sig: Take 1 tablet (100 mg total) by mouth once as needed for migraine (may repeat in 4 hours, max 200 mg in 24 hours) may repeat in 2 hours if necessary. Max dose 200mg in 24 hour period.   acetaZOLAMIDE (DIAMOX) 250 mg tablet 5/13/2025 Noon  No Yes   Sig: Take 2 tablets (500 mg total) by mouth every 12 (twelve) hours   butalbital-acetaminophen-caffeine (Esgic) -40 mg per tablet 5/12/2025 at 10:00 PM  No Yes   Sig: Take 1 tablet by mouth every 6 (six) hours as needed for headaches or migraine for up to 3 days   cyclobenzaprine (FLEXERIL) 10 mg tablet Past Week  No Yes   Sig: Take 1 tablet (10 mg total) by mouth 3 (three) times a day as needed for muscle spasms for up to 4 days   dexamethasone (DECADRON) 4 mg tablet 5/13/2025 at 10:30 AM  No Yes   Sig: Take 2 tabs in morning with breakfast for 2 days, then 1.5 tabs for 2 days, then 1 tab for 2 days, then 1/2 tab for 1 day.   naproxen (NAPROSYN) 500 mg tablet 5/13/2025 Noon  No Yes   Sig: Take 1 tablet (500 mg total) by mouth 2 (two) times a day with meals for 7 days      Facility-Administered Medications: None     Discharge Medication List as of 5/13/2025 10:08 PM        CONTINUE these medications which have NOT CHANGED    Details   acetaZOLAMIDE (DIAMOX) 250 mg tablet Take 2 tablets (500 mg total) by mouth every 12  (twelve) hours, Starting Thu 5/8/2025, Normal      butalbital-acetaminophen-caffeine (Esgic) -40 mg per tablet Take 1 tablet by mouth every 6 (six) hours as needed for headaches or migraine for up to 3 days, Starting Sat 5/10/2025, Until Tue 5/13/2025 at 2359, Normal      cyclobenzaprine (FLEXERIL) 10 mg tablet Take 1 tablet (10 mg total) by mouth 3 (three) times a day as needed for muscle spasms for up to 4 days, Starting Sat 5/10/2025, Until Wed 5/14/2025 at 2359, Normal      dexamethasone (DECADRON) 4 mg tablet Take 2 tabs in morning with breakfast for 2 days, then 1.5 tabs for 2 days, then 1 tab for 2 days, then 1/2 tab for 1 day., Normal      naproxen (NAPROSYN) 500 mg tablet Take 1 tablet (500 mg total) by mouth 2 (two) times a day with meals for 7 days, Starting Sat 5/10/2025, Until Sat 5/17/2025, Normal      SUMAtriptan (IMITREX) 100 mg tablet Take 1 tablet (100 mg total) by mouth once as needed for migraine (may repeat in 4 hours, max 200 mg in 24 hours) may repeat in 2 hours if necessary. Max dose 200mg in 24 hour period., Starting Tue 5/6/2025, Normal           No discharge procedures on file.  ED SEPSIS DOCUMENTATION   Time reflects when diagnosis was documented in both MDM as applicable and the Disposition within this note       Time User Action Codes Description Comment    5/13/2025  7:50 PM Kristian King Add [G97.1] Post-dural puncture headache                  Kristian King MD  05/13/25 7502

## 2025-05-21 ENCOUNTER — OFFICE VISIT (OUTPATIENT)
Dept: FAMILY MEDICINE CLINIC | Facility: CLINIC | Age: 35
End: 2025-05-21
Payer: COMMERCIAL

## 2025-05-21 VITALS
HEART RATE: 100 BPM | DIASTOLIC BLOOD PRESSURE: 74 MMHG | HEIGHT: 62 IN | RESPIRATION RATE: 16 BRPM | OXYGEN SATURATION: 98 % | SYSTOLIC BLOOD PRESSURE: 126 MMHG | TEMPERATURE: 98.5 F | WEIGHT: 221 LBS | BODY MASS INDEX: 40.67 KG/M2

## 2025-05-21 DIAGNOSIS — E66.01 MORBID OBESITY (HCC): ICD-10-CM

## 2025-05-21 DIAGNOSIS — G93.2 INTRACRANIAL HYPERTENSION: Primary | ICD-10-CM

## 2025-05-21 PROCEDURE — 99213 OFFICE O/P EST LOW 20 MIN: CPT | Performed by: FAMILY MEDICINE

## 2025-05-21 RX ORDER — ACETAZOLAMIDE 250 MG/1
250 TABLET ORAL EVERY 12 HOURS SCHEDULED
Start: 2025-05-21

## 2025-05-21 NOTE — PROGRESS NOTES
"Name: Lena Coates      : 1990      MRN: [id number]  Encounter Provider: Leigh Baker DO  Encounter Date: 2025   Encounter department: [facility]  :  Assessment & Plan  Intracranial hypertension  Unable to tolerate diamox 500 mg twice a day  She is going to cut down to 250 mg twice a day   For the first 3 days she will only take it once a day, then go up to twice daily as long as she is tolerating it well.  Will follow up with neurology in July    Orders:  •  acetaZOLAMIDE (DIAMOX) 250 mg tablet; Take 1 tablet (250 mg total) by mouth every 12 (twelve) hours    Morbid obesity (HCC)  Weight loss advised to help with intracranial hypertension          Assessment & Plan      Return if symptoms worsen or fail to improve.     History of Present Illness   She couldn't tolerate the diamox.  She was getting dizzy and didn't feel well on it.  She is still having a mild headache and is taking advil.     She has been seen by opthalmology       Review of Systems    Objective   /74   Pulse 100   Temp 98.5 °F (36.9 °C)   Resp 16   Ht 5' 2\" (1.575 m)   Wt 100 kg (221 lb)   LMP 11/15/2024 (Approximate)   SpO2 98%   BMI 40.42 kg/m²      Physical Exam  Vitals and nursing note reviewed.   Constitutional:       General: She is not in acute distress.     Appearance: She is well-developed.   HENT:      Head: Normocephalic and atraumatic.      Right Ear: Tympanic membrane normal.      Left Ear: Tympanic membrane normal.     Cardiovascular:      Rate and Rhythm: Normal rate and regular rhythm.      Heart sounds: No murmur heard.  Pulmonary:      Effort: Pulmonary effort is normal. No respiratory distress.      Breath sounds: Normal breath sounds.     Musculoskeletal:      Cervical back: Neck supple.     Neurological:      Mental Status: She is alert.         "

## 2025-05-21 NOTE — ASSESSMENT & PLAN NOTE
Unable to tolerate diamox 500 mg twice a day  She is going to cut down to 250 mg twice a day   For the first 3 days she will only take it once a day, then go up to twice daily as long as she is tolerating it well.  Will follow up with neurology in July    Orders:  •  acetaZOLAMIDE (DIAMOX) 250 mg tablet; Take 1 tablet (250 mg total) by mouth every 12 (twelve) hours

## 2025-05-22 NOTE — TELEPHONE ENCOUNTER
Scheduled by:  Notes: np/intracranial hyper tension/blue cross   Rescheduled: 5/13/2025 11:25 AM By: PIERCE SAUNDERS [[id number]] (ES)

## 2025-06-01 ENCOUNTER — ANESTHESIA (OUTPATIENT)
Dept: ANESTHESIOLOGY | Facility: HOSPITAL | Age: 35
End: 2025-06-01

## 2025-06-01 ENCOUNTER — ANESTHESIA EVENT (OUTPATIENT)
Dept: ANESTHESIOLOGY | Facility: HOSPITAL | Age: 35
End: 2025-06-01

## 2025-06-06 ENCOUNTER — TELEPHONE (OUTPATIENT)
Dept: GASTROENTEROLOGY | Facility: AMBULARY SURGERY CENTER | Age: 35
End: 2025-06-06

## 2025-06-06 NOTE — TELEPHONE ENCOUNTER
Spoke to patient to confirm colonoscopy. Patient has prep and prep instructions. She is aware that the procedure is scheduled with Dr. Brandt.

## 2025-06-14 RX ORDER — SODIUM CHLORIDE, SODIUM LACTATE, POTASSIUM CHLORIDE, CALCIUM CHLORIDE 600; 310; 30; 20 MG/100ML; MG/100ML; MG/100ML; MG/100ML
125 INJECTION, SOLUTION INTRAVENOUS CONTINUOUS
Status: CANCELLED | OUTPATIENT
Start: 2025-06-14

## 2025-06-16 ENCOUNTER — HOSPITAL ENCOUNTER (OUTPATIENT)
Dept: GASTROENTEROLOGY | Facility: AMBULARY SURGERY CENTER | Age: 35
Setting detail: OUTPATIENT SURGERY
Discharge: HOME | End: 2025-06-16
Attending: INTERNAL MEDICINE | Admitting: INTERNAL MEDICINE
Payer: COMMERCIAL

## 2025-06-16 ENCOUNTER — ANESTHESIA EVENT (OUTPATIENT)
Dept: GASTROENTEROLOGY | Facility: AMBULARY SURGERY CENTER | Age: 35
End: 2025-06-16
Payer: COMMERCIAL

## 2025-06-16 VITALS
SYSTOLIC BLOOD PRESSURE: 113 MMHG | BODY MASS INDEX: 39.32 KG/M2 | TEMPERATURE: 98.1 F | RESPIRATION RATE: 16 BRPM | HEART RATE: 76 BPM | OXYGEN SATURATION: 99 % | WEIGHT: 215 LBS | DIASTOLIC BLOOD PRESSURE: 64 MMHG

## 2025-06-16 DIAGNOSIS — K57.32 DIVERTICULITIS OF LARGE INTESTINE WITHOUT PERFORATION OR ABSCESS WITHOUT BLEEDING: ICD-10-CM

## 2025-06-16 DIAGNOSIS — R93.5 ABNORMAL CT OF THE ABDOMEN: ICD-10-CM

## 2025-06-16 PROCEDURE — 45378 DIAGNOSTIC COLONOSCOPY: CPT | Performed by: INTERNAL MEDICINE

## 2025-06-16 RX ORDER — PROPOFOL 10 MG/ML
INJECTION, EMULSION INTRAVENOUS AS NEEDED
Status: DISCONTINUED | OUTPATIENT
Start: 2025-06-16 | End: 2025-06-16

## 2025-06-16 RX ORDER — SODIUM CHLORIDE, SODIUM LACTATE, POTASSIUM CHLORIDE, CALCIUM CHLORIDE 600; 310; 30; 20 MG/100ML; MG/100ML; MG/100ML; MG/100ML
125 INJECTION, SOLUTION INTRAVENOUS CONTINUOUS
Status: DISCONTINUED | OUTPATIENT
Start: 2025-06-16 | End: 2025-06-20 | Stop reason: HOSPADM

## 2025-06-16 RX ORDER — ONDANSETRON 2 MG/ML
4 INJECTION INTRAMUSCULAR; INTRAVENOUS ONCE AS NEEDED
Status: CANCELLED | OUTPATIENT
Start: 2025-06-16

## 2025-06-16 RX ORDER — LIDOCAINE HYDROCHLORIDE 10 MG/ML
INJECTION, SOLUTION EPIDURAL; INFILTRATION; INTRACAUDAL; PERINEURAL AS NEEDED
Status: DISCONTINUED | OUTPATIENT
Start: 2025-06-16 | End: 2025-06-16

## 2025-06-16 RX ORDER — SODIUM CHLORIDE, SODIUM LACTATE, POTASSIUM CHLORIDE, CALCIUM CHLORIDE 600; 310; 30; 20 MG/100ML; MG/100ML; MG/100ML; MG/100ML
INJECTION, SOLUTION INTRAVENOUS CONTINUOUS PRN
Status: DISCONTINUED | OUTPATIENT
Start: 2025-06-16 | End: 2025-06-16

## 2025-06-16 RX ADMIN — SODIUM CHLORIDE, SODIUM LACTATE, POTASSIUM CHLORIDE, AND CALCIUM CHLORIDE: .6; .31; .03; .02 INJECTION, SOLUTION INTRAVENOUS at 07:25

## 2025-06-16 RX ADMIN — SODIUM CHLORIDE, SODIUM LACTATE, POTASSIUM CHLORIDE, AND CALCIUM CHLORIDE 125 ML/HR: .6; .31; .03; .02 INJECTION, SOLUTION INTRAVENOUS at 07:04

## 2025-06-16 RX ADMIN — LIDOCAINE HYDROCHLORIDE 50 MG: 10 INJECTION, SOLUTION EPIDURAL; INFILTRATION; INTRACAUDAL; PERINEURAL at 07:39

## 2025-06-16 RX ADMIN — PROPOFOL 140 MCG/KG/MIN: 10 INJECTION, EMULSION INTRAVENOUS at 07:41

## 2025-06-16 RX ADMIN — PROPOFOL 100 MG: 10 INJECTION, EMULSION INTRAVENOUS at 07:39

## 2025-06-16 RX ADMIN — PROPOFOL 50 MG: 10 INJECTION, EMULSION INTRAVENOUS at 07:40

## 2025-06-16 NOTE — ANESTHESIA PREPROCEDURE EVALUATION
Procedure:  COLONOSCOPY    Relevant Problems   CARDIO   (+) Intractable migraine without aura and without status migrainosus      NEURO/PSYCH   (+) Anxiety   (+) Intractable migraine without aura and without status migrainosus        Physical Exam    Airway     Mallampati score: II  TM Distance: >3 FB  Neck ROM: full  Mouth opening: >= 4 cm      Cardiovascular  Cardiovascular exam normal    Dental   No notable dental hx     Pulmonary  Pulmonary exam normal     Neurological    She appears awake, alert and oriented x3.      Other Findings  post-pubertal.      Anesthesia Plan  ASA Score- 2     Anesthesia Type- IV sedation with anesthesia with ASA Monitors.         Additional Monitors:     Airway Plan:            Plan Factors-Exercise tolerance (METS): >4 METS.    Chart reviewed.   Existing labs reviewed. Patient summary reviewed.    Patient is not a current smoker.              Induction-     Postoperative Plan- .   Monitoring Plan - Monitoring plan - standard ASA monitoring  Post Operative Pain Plan - non-opiod analgesics        Informed Consent- Anesthetic plan and risks discussed with patient.  I personally reviewed this patient with the CRNA. Discussed and agreed on the Anesthesia Plan with the CRNA..      NPO Status:  Vitals Value Taken Time   Date of last liquid 06/16/25 06/16/25 06:51   Time of last liquid 0315 06/16/25 06:51   Date of last solid 06/14/25 06/16/25 06:51   Time of last solid 1900 06/16/25 06:51

## 2025-06-16 NOTE — PERIOPERATIVE NURSING NOTE
Patient transferred to room - via stretcher. Bedside report given to Hoda CEDILLO receiving RN, call bell placed in reach, stretcher to lowest position, BL side rails up

## 2025-06-16 NOTE — H&P
History and Physical -  [facility]  Lena Coates 34 y.o. female MRN: [id number]                  HPI: Lena Coates is a 34 y.o. year old female who presents for diverticular disease.      REVIEW OF SYSTEMS: Per the HPI, and otherwise unremarkable.    Historical Information   Past Medical History[1]  Past Surgical History[2]  Social History   Social History     Substance and Sexual Activity   Alcohol Use Not Currently    Comment: social     Social History     Substance and Sexual Activity   Drug Use No     Tobacco Use History[3]  Family History[4]    Meds/Allergies     Current Medications[5]    Allergies[6]    Objective     /73   Pulse 88   Temp 98.1 °F (36.7 °C) (Temporal)   Resp 18   Wt 97.5 kg (215 lb)   LMP 11/15/2024 (Approximate)   SpO2 96%   BMI 39.32 kg/m²       PHYSICAL EXAM    Gen: NAD  Head: NCAT  CV: RRR  CHEST: Clear  ABD: soft, NT/ND  EXT: no edema      ASSESSMENT/PLAN:  This is a 34 y.o. year old female here for colonoscopy, and she is stable and optimized for her procedure.             [1]   Past Medical History:  Diagnosis Date    Abnormal weight gain     Anemia     during pregnancy    Candidiasis, mouth     Cellulitis of right arm     Diverticulitis of colon 2025    Er visit ct scan confirmed    Kidney infection     Lyme disease     Pre-employment health screening examination 2018    SIRS (systemic inflammatory response syndrome) (HCC)     Tinea corporis    [2]   Past Surgical History:  Procedure Laterality Date    ABDOMINAL SURGERY  2017    Two c sections    BREAST BIOPSY       SECTION      FOOT SURGERY  [id number]    Four surgeries in left foot    HYSTERECTOMY  2024    Uterus and cervix    IR LUMBAR PUNCTURE  2025    [place] CYSTOURETHROSCOPY N/A 2024    Procedure: CYSTOSCOPY;  Surgeon: Keyla Peter MD;  Location: [facility];  Service: Gynecology    [place] LAPS TOTAL HYSTERECT 250 GM/< W/RMVL TUBE/OVARY  Bilateral 12/18/2024    Procedure: HYSTERECTOMY LAPAROSCOPIC TOTAL (LTH) WITH RIGHT PARTIAL SALPINGECTOMY, LYSIS OF ADHESIONS, EXAM UNDER ANESTHESIA;  Surgeon: Keyla Peter MD;  Location: [facility];  Service: Gynecology    SALPINGECTOMY Bilateral     TUBAL LIGATION  4/19/2021    Tubes removed    US GUIDED BREAST BIOPSY RIGHT COMPLETE Right 11/18/2021   [3]   Social History  Tobacco Use   Smoking Status Never    Passive exposure: Never   Smokeless Tobacco Never   [4]   Family History  Problem Relation Name Age of Onset    Diabetes Mother Missy Sibley     Atrial fibrillation Mother Missy Sibley     No Known Problems Father      Diabetes Maternal Grandmother Josefa Longacre     Cancer Maternal Grandfather Jayden Longacre     Cancer Paternal Grandfather Justo [place]     Depression Half-Sister Corrinne     Learning disabilities Half-Brother Alirio     Auditory processing disorder Half-Brother Alirio     Breast cancer Other paternal great aunt    [5]   Current Outpatient Medications:     acetaZOLAMIDE (DIAMOX) 250 mg tablet    Current Facility-Administered Medications:     lactated ringers infusion, 125 mL/hr, Intravenous, Continuous, 125 mL/hr at 06/16/25 0704    lactated ringers infusion, 125 mL/hr, Intravenous, Continuous    Facility-Administered Medications Ordered in Other Encounters:     lactated ringers infusion, , Intravenous, Continuous PRN, New Bag at 06/16/25 0725  [6] No Known Allergies

## 2025-07-09 ENCOUNTER — OFFICE VISIT (OUTPATIENT)
Age: 35
End: 2025-07-09
Payer: COMMERCIAL

## 2025-07-09 ENCOUNTER — TELEPHONE (OUTPATIENT)
Age: 35
End: 2025-07-09

## 2025-07-09 VITALS
WEIGHT: 220 LBS | HEART RATE: 81 BPM | DIASTOLIC BLOOD PRESSURE: 78 MMHG | BODY MASS INDEX: 40.48 KG/M2 | HEIGHT: 62 IN | SYSTOLIC BLOOD PRESSURE: 106 MMHG | TEMPERATURE: 97.5 F | OXYGEN SATURATION: 98 %

## 2025-07-09 DIAGNOSIS — G43.909 EPISODIC MIGRAINE: Primary | ICD-10-CM

## 2025-07-09 DIAGNOSIS — G43.919 INTRACTABLE MIGRAINE: ICD-10-CM

## 2025-07-09 PROCEDURE — 99214 OFFICE O/P EST MOD 30 MIN: CPT | Performed by: PSYCHIATRY & NEUROLOGY

## 2025-07-09 NOTE — PROGRESS NOTES
Return NeuroOutpatient Note        Lena Coates  [id number]  34 y.o.  1990       Intracranial hypertension  and Migraine        History obtained from:  Patient     HPI/Subjective:    Lena Coates is a 33 yo F with PMH Of migraines presents as hospital f/u. Patient was seen by me on May 8th, 2025 for headaches. She had h/o migraines but these were different. She was asked to have LP and OP was 24 which was brought down to 9. She was placed on diamox. Her headaches were better but she was fatigued and days later, again headache returned and it was positional and thought to be post LP induced. She had blood patch and it did help. She then had ophthalmology evaluation and there is no involvement of optic nerve. She's not on diamox anymore.   Since that time, she may get episodic headache with pain behind left eye. In month of June, she had 6-7 days total of headaches. She says there is a lot of stress as well.   She was given imitrex which hadn't worked.   She doesn't want to be on topamax.     Past Medical History[1]  Social History     Socioeconomic History   • Marital status: /Civil Union     Spouse name: Not on file   • Number of children: Not on file   • Years of education: Not on file   • Highest education level: Not on file   Occupational History   • Not on file   Tobacco Use   • Smoking status: Never     Passive exposure: Never   • Smokeless tobacco: Never   Vaping Use   • Vaping status: Never Used   Substance and Sexual Activity   • Alcohol use: Not Currently     Comment: social   • Drug use: No   • Sexual activity: Not Currently     Partners: Male     Birth control/protection: Female Sterilization   Other Topics Concern   • Not on file   Social History Narrative   • Not on file     Social Drivers of Health     Financial Resource Strain: Not on file   Food Insecurity: No Food Insecurity (6/16/2025)    Nursing - Inadequate Food Risk Classification    • Worried About Running Out of Food in  "the Last Year: Not on file    • Ran Out of Food in the Last Year: Not on file    • Ran Out of Food in the Last Year: Never true   Transportation Needs: No Transportation Needs (6/16/2025)    Nursing - Transportation Risk Classification    • Lack of Transportation: Not on file    • Lack of Transportation: No   Physical Activity: Not on file   Stress: Not on file   Social Connections: Not on file   Intimate Partner Violence: Unknown (6/16/2025)    Nursing IPS    • Feels Physically and Emotionally Safe: Not on file    • Physically Hurt by Someone: Not on file    • Humiliated or Emotionally Abused by Someone: Not on file    • Physically Hurt by Someone: No    • Hurt or Threatened by Someone: No   Housing Stability: Unknown (6/16/2025)    Nursing: Inadequate Housing Risk Classification    • Has Housing: Not on file    • Worried About Losing Housing: Not on file    • Unable to Get Utilities: Not on file    • Unable to Pay for Housing in the Last Year: No    • Has Housing: No     Family History[2]  Allergies[3]  Medications Ordered Prior to Encounter[4]      Review of Systems   Refer to positive review of systems in HPI.   Review of Systems    Constitutional- No fever  Eyes- No visual change  ENT- Hearing normal  CV- No chest pain  Resp- No Shortness of breath  GI- No diarrhea  - Bladder normal  MS- No Arthritis   Skin- No rash  Psych- No depression  Endo- No DM  Heme- No nodes    Vitals:    07/09/25 1107   BP: 106/78   BP Location: Left arm   Patient Position: Sitting   Cuff Size: Standard   Pulse: 81   Temp: 97.5 °F (36.4 °C)   TempSrc: Temporal   SpO2: 98%   Weight: 99.8 kg (220 lb)   Height: 5' 2\" (1.575 m)       PHYSICAL EXAM:  Appearance: No Acute Distress  Ophthalmoscopic: Disc Flat, Normal fundus  Mental status:  Orientation: Awake, Alert, and Orientedx3  Memory: Registation 3/3 Recall 3/3  Attention: normal  Knowledge: good  Language: No aphasia  Speech: No dysarthria  Cranial Nerves:  2 No Visual Defect on " Confrontation, Pupils round, equal, reactive to light  3,4,6 Extraocular Movements Intact, no nystagmus  5 Facial Sensation Intact  7 No facial asymmetry  8 Intact hearing  9,10 Palate symmetric, normal gag  11 Good shoulder shrug  12 Tongue Midline  Gait: Stable  Coordination: No ataxia with finger to nose testing, and heel to shin  Sensory: Intact, Symmetric to pinprick, light touch, vibration, and joint position  Muscle Tone: Normal              Muscle exam:  Arm Right Left Leg Right Left   Deltoid 5/5 5/5 Iliopsoas 5/5 5/5   Biceps 5/5 5/5 Quads 5/5 5/5   Triceps 5/5 5/5 Hamstrings 5/5 5/5   Wrist Extension 5/5 5/5 Ankle Dorsi Flexion 5/5 5/5   Wrist Flexion 5/5 5/5 Ankle Plantar Flexion 5/5 5/5   Interossei 5/5 5/5 Ankle Eversion 5/5 5/5   APB 5/5 5/5 Ankle Inversion 5/5 5/5       Reflexes   RJ BJ TJ KJ AJ Plantars Flanagan's   Right 2+ 2+ 2+ 2+  Downgoing Not present   Left 2+ 2+ 2+ 2+  Downgoing Not present     Personal review of  Labs:                  Diagnoses and all orders for this visit:    Episodic migraine  -     rimegepant sulfate (NURTEC) 75 mg TBDP; Take 1 tablet (75 mg total) by mouth once as needed (migraine) for up to 1 dose    Intractable migraine        Assessment & Plan  Episodic migraine    Orders:  •  rimegepant sulfate (NURTEC) 75 mg TBDP; Take 1 tablet (75 mg total) by mouth once as needed (migraine) for up to 1 dose    Intractable migraine  She would benefit from being on nurtec as needed.                          Total time of encounter:  30 min  More than 50% of the time was used in counseling and/or coordination of care  Extent of counseling and/or coordination of care        Ancelmo Genao MD  [facility]  [address] [place] [id number] [phone]           [1]  Past Medical History:  Diagnosis Date   • Abnormal weight gain    • Anemia 2017    during pregnancy   • Candidiasis, mouth    • Cellulitis of right arm    • Diverticulitis of colon 4/2025     Er visit ct scan confirmed   • Kidney infection    • Lyme disease    • Pre-employment health screening examination 07/24/2018   • SIRS (systemic inflammatory response syndrome) (HCC)    • Tinea corporis    [2]  Family History  Problem Relation Name Age of Onset   • Diabetes Mother Missy Sibley    • Atrial fibrillation Mother Missy Sibley    • No Known Problems Father     • Diabetes Maternal Grandmother Josefa Irvin    • Cancer Maternal Grandfather Jayden Irvin    • Cancer Paternal Grandfather Justo Duncan    • Depression Half-Sister Corrinne    • Learning disabilities Half-Brother Alirio    • Auditory processing disorder Half-Brother Alirio    • Breast cancer Other paternal great aunt    [3]  No Known Allergies[4]  Current Outpatient Medications on File Prior to Visit   Medication Sig Dispense Refill   • acetaZOLAMIDE (DIAMOX) 250 mg tablet Take 1 tablet (250 mg total) by mouth every 12 (twelve) hours (Patient not taking: Reported on 7/9/2025)       No current facility-administered medications on file prior to visit.

## 2025-07-09 NOTE — TELEPHONE ENCOUNTER
PA for Nurtec 75mg TBDP SUBMITTED to [employer] [place]    via    []CMM-KEY:   [x]Surescripts-Case ID #   []Availity-Auth ID # NDC #   []Faxed to plan   []Other website   []Phone call Case ID #     [x]PA sent as URGENT    All office notes, labs and other pertaining documents and studies sent. Clinical questions answered. Awaiting determination from insurance company.     Turnaround time for your insurance to make a decision on your Prior Authorization can take 7-21 business days.

## 2025-07-10 NOTE — TELEPHONE ENCOUNTER
PA for Nurtec 75 mg  DENIED    Reason:(Screenshot if applicable)        Message sent to office clinical pool Yes    Denial letter scanned into Media Yes    We can gladly do an appeal but the process can take about 30-60 days to provide determination. Please have the office staff schedule a Peer to Peer at phone please see number on insurance card  . If an appeal is truly warranted please have Provider send clinical documentation to the PA department to support the appeal.     **Please follow up with your patient regarding denial and next steps**

## 2025-07-14 NOTE — TELEPHONE ENCOUNTER
Ancelmo Genao MD to Nara Quinteros MA        7/10/25  3:39 PM  She's tried and failed imitrex.  Tell patient that she would need to try another maxalt to get approval for nurtec. If agreeable, ask Itz Huber to order maxalt as I'll be away for a week.

## 2025-07-14 NOTE — TELEPHONE ENCOUNTER
Called and left a detailed message on pt's answering machine of all of the below and for a call back

## 2025-08-11 ENCOUNTER — ANNUAL EXAM (OUTPATIENT)
Dept: OBGYN CLINIC | Facility: CLINIC | Age: 35
End: 2025-08-11
Payer: COMMERCIAL

## (undated) DEVICE — TROCAR: Brand: KII® SLEEVE

## (undated) DEVICE — 3M™ TEGADERM™ CHG DRESSING 25/CARTON 4 CARTONS/CASE 1659: Brand: TEGADERM™

## (undated) DEVICE — GLOVE INDICATOR PI UNDERGLOVE SZ 7 BLUE

## (undated) DEVICE — SUT VLOC 180  0 8IN  VLOCA008L

## (undated) DEVICE — Device

## (undated) DEVICE — ENDOPATH PNEUMONEEDLE INSUFFLATION NEEDLES WITH LUER LOCK CONNECTORS 120MM: Brand: ENDOPATH

## (undated) DEVICE — CHLORHEXIDINE 4PCT 4 OZ

## (undated) DEVICE — SUT VICRYL 0 UR-6 27 IN J603H

## (undated) DEVICE — SUTURING DEVICE: Brand: ENDO STITCH

## (undated) DEVICE — TOWEL SURG XR DETECT GREEN STRL RFD

## (undated) DEVICE — LIGHT GLOVE GREEN

## (undated) DEVICE — SPONGE LAP 18 X 18 IN STRL RFD

## (undated) DEVICE — GAUZE SPONGES,16 PLY: Brand: CURITY

## (undated) DEVICE — ASTOUND STANDARD SURGICAL GOWN, XL: Brand: CONVERTORS

## (undated) DEVICE — 40583 XL ADVANCED TRENDELENBURG POSITIONING KIT: Brand: 40583 XL ADVANCED TRENDELENBURG POSITIONING KIT

## (undated) DEVICE — STERILE DOUBLE BASIN SET PACK: Brand: CARDINAL HEALTH

## (undated) DEVICE — TRAY,IRRIGATION,PISTON SYRINGE,60ML,STRL: Brand: MEDLINE

## (undated) DEVICE — TRAY FOLEY 16FR URIMETER SILICONE SURESTEP

## (undated) DEVICE — METZENBAUM ADTEC SINGLE USE DISSECTING SCISSORS, SHAFT ONLY, MONOPOLAR, CURVED TO LEFT, WORKING LENGTH: 12 1/4", (310 MM), DIAM. 5 MM, INSULATED, DOUBLE ACTION, STERILE, DISPOSABLE, PACKAGE OF 10 PIECES: Brand: AESCULAP

## (undated) DEVICE — ELECTRODE LAP SPATULA CRV E-Z CLEAN 33CM -0018C

## (undated) DEVICE — PERI/GYN PACK: Brand: CONVERTORS

## (undated) DEVICE — ENDOPATH 5MM ENDOSCOPIC BLUNT TIP DISSECTORS (12 POUCHES CONTAINING 3 DISSECTORS EACH): Brand: ENDOPATH

## (undated) DEVICE — INTENDED FOR TISSUE SEPARATION, AND OTHER PROCEDURES THAT REQUIRE A SHARP SURGICAL BLADE TO PUNCTURE OR CUT.: Brand: BARD-PARKER SAFETY BLADES SIZE 11, STERILE

## (undated) DEVICE — DRAPE,UNDERBUTTOCKS,PCH,STERILE: Brand: MEDLINE

## (undated) DEVICE — PACK GENERAL LF

## (undated) DEVICE — CHLORAPREP HI-LITE 26ML ORANGE

## (undated) DEVICE — 2, DISPOSABLE SUCTION/IRRIGATOR WITHOUT DISPOSABLE TIP: Brand: STRYKEFLOW

## (undated) DEVICE — [HIGH FLOW INSUFFLATOR,  DO NOT USE IF PACKAGE IS DAMAGED,  KEEP DRY,  KEEP AWAY FROM SUNLIGHT,  PROTECT FROM HEAT AND RADIOACTIVE SOURCES.]: Brand: PNEUMOSURE

## (undated) DEVICE — SYRINGE 10ML LL NEEDLE 21G X 1 NON SAFETY

## (undated) DEVICE — OCCLUDER COLPO-PNEUMO

## (undated) DEVICE — MICRO HVTSA, 0.5G AND HVTSA SOURCEMARK PRODUCT CODE M1206 AND M1206-01: Brand: EXOFIN MICRO HVTSA, 0.5G

## (undated) DEVICE — SUT VICRYL 0 CT-1 CR/8 27 IN JJ41G

## (undated) DEVICE — PREMIUM DRY TRAY LF: Brand: MEDLINE INDUSTRIES, INC.

## (undated) DEVICE — 1840 FOAM BLOCK NEEDLE COUNTER: Brand: DEVON

## (undated) DEVICE — GLOVE SRG LF STRL BGL SKNSNS 6.5 PF

## (undated) DEVICE — TROCAR: Brand: KII FIOS FIRST ENTRY

## (undated) DEVICE — SUT MONOCRYL 4-0 PS-2 27 IN Y426H

## (undated) DEVICE — SHEARS: Brand: ENDO MINI-SHEARS

## (undated) DEVICE — ENSEAL X1 TISSUE SEALER, CURVED JAW, 37 CM SHAFT LENGTH: Brand: ENSEAL

## (undated) DEVICE — UTERINE MANIPULATOR RUMI 6.7 X 8 CM